# Patient Record
Sex: FEMALE | Race: WHITE | Employment: OTHER | URBAN - METROPOLITAN AREA
[De-identification: names, ages, dates, MRNs, and addresses within clinical notes are randomized per-mention and may not be internally consistent; named-entity substitution may affect disease eponyms.]

---

## 2017-01-02 ENCOUNTER — TELEPHONE (OUTPATIENT)
Dept: FAMILY MEDICINE | Facility: CLINIC | Age: 57
End: 2017-01-02

## 2017-01-02 ENCOUNTER — THERAPY VISIT (OUTPATIENT)
Dept: CHIROPRACTIC MEDICINE | Facility: CLINIC | Age: 57
End: 2017-01-02
Payer: COMMERCIAL

## 2017-01-02 DIAGNOSIS — M99.01 SEGMENTAL DYSFUNCTION OF CERVICAL REGION: Primary | ICD-10-CM

## 2017-01-02 DIAGNOSIS — M99.03 SEGMENTAL DYSFUNCTION OF LUMBAR REGION: ICD-10-CM

## 2017-01-02 DIAGNOSIS — M54.2 CERVICALGIA: ICD-10-CM

## 2017-01-02 DIAGNOSIS — M54.12 BRACHIAL NEURITIS OR RADICULITIS: ICD-10-CM

## 2017-01-02 DIAGNOSIS — M99.02 SEGMENTAL DYSFUNCTION OF THORACIC REGION: ICD-10-CM

## 2017-01-02 PROCEDURE — 98941 CHIROPRACT MANJ 3-4 REGIONS: CPT | Mod: AT | Performed by: CHIROPRACTOR

## 2017-01-02 NOTE — PROGRESS NOTES
"Visit #:  32    Subjective:  Jaki Gómez is a 55 year old female who is seen in f/u up for:        Segmental dysfunction of cervical region  Segmental dysfunction of lumbar region  Brachial neuritis or radiculitis  Cervicalgia  Segmental dysfunction of thoracic region.     Since last visit on 12/16/2016,  Jaki Gómez reports: that she is doing pretty good. Her legs and butt are achey. She rates her pain 5/10. She feels like \"one solid block.\"     ADL's : ADLs have improved with injections.     Objective:  The following was observed:    P: pain elicited on palpation, right CT junction, right lower back pain  A: static palpation demonstrates intersegmental asymmetry, as noted  R: motion palpation notes restricted motion  T: hypertonicity at:  Upper traps R>>L    Segmental spinal dysfunction/restrictions found at:  C2 RR, LRR  C5 LR, RRR  T1 RR,  LRR  T7 E, FR  L4 LR, RRR  Right SI posterior, restricted P to A        Assessment:    Diagnoses:      1. Segmental dysfunction of cervical region    2. Segmental dysfunction of lumbar region    3. Brachial neuritis or radiculitis    4. Cervicalgia    5. Segmental dysfunction of thoracic region        Patient's condition:  Patient had restrictions pre-manipulation and Patient had decreased motion prior to manipulation    Treatment effectiveness:  Post manipulation there is better intersegmental movement and Patient claims to feel looser post manipulation      Procedures:  CMT:  12376 Chiropractic manipulative treatment 3-4 regions performed   Cervical: Diversified, C2, C5, Supine   Thoracic: Diversified, T1, T7, Prone   Pelvis: Drop, Right SI, L4, Prone      Modalities:  MSTM to affected musculature, 5 min    Therapeutic procedures:  None      Prognosis: Good    Progress towards Goals: Patient has had recent exacerbation with out care. She would like to go back to once per week.    Recommendations:    Instructions:ice 20 minutes every other hour as needed and stretch as " instructed at visit    Follow-up:  Return to care in 1 week.

## 2017-01-02 NOTE — TELEPHONE ENCOUNTER
Prior auth needed for BID for Omeprazole 40mg.     Pt ins: [place] [phone]  Pt ID#: [id number]    Genesis Gómez, Pharmacy Technician  [facility]  [phone]

## 2017-01-03 DIAGNOSIS — M54.2 CERVICALGIA: Primary | ICD-10-CM

## 2017-01-03 NOTE — TELEPHONE ENCOUNTER
Tramadol   Last Written Prescription Date: 12/7/16  Last Fill Quantity: 120,  # refills: 0   Last Office Visit with [facility], [facility] or [facility] prescribing provider: 5/16/16      Yamile Arthur  [facility].  [facility]  [phone]

## 2017-01-04 RX ORDER — TRAMADOL HYDROCHLORIDE 50 MG/1
50 TABLET ORAL EVERY 6 HOURS PRN
Qty: 120 TABLET | Refills: 0 | Status: SHIPPED | OUTPATIENT
Start: 2017-01-04 | End: 2017-02-03

## 2017-01-11 NOTE — TELEPHONE ENCOUNTER
Patient will need to be seen or do over the counter.  This is not on her medication list.  Pharmacy notified.

## 2017-01-13 ENCOUNTER — THERAPY VISIT (OUTPATIENT)
Dept: CHIROPRACTIC MEDICINE | Facility: CLINIC | Age: 57
End: 2017-01-13
Payer: COMMERCIAL

## 2017-01-13 DIAGNOSIS — M99.02 SEGMENTAL DYSFUNCTION OF THORACIC REGION: ICD-10-CM

## 2017-01-13 DIAGNOSIS — M99.03 SEGMENTAL DYSFUNCTION OF LUMBAR REGION: ICD-10-CM

## 2017-01-13 DIAGNOSIS — M54.12 BRACHIAL NEURITIS OR RADICULITIS: ICD-10-CM

## 2017-01-13 DIAGNOSIS — M99.07 SEGMENTAL AND SOMATIC DYSFUNCTION OF UPPER EXTREMITY: ICD-10-CM

## 2017-01-13 DIAGNOSIS — M99.01 SEGMENTAL DYSFUNCTION OF CERVICAL REGION: Primary | ICD-10-CM

## 2017-01-13 DIAGNOSIS — M54.2 CERVICALGIA: ICD-10-CM

## 2017-01-13 PROCEDURE — 98943 CHIROPRACT MANJ XTRSPINL 1/>: CPT | Performed by: CHIROPRACTOR

## 2017-01-13 PROCEDURE — 98940 CHIROPRACT MANJ 1-2 REGIONS: CPT | Mod: AT | Performed by: CHIROPRACTOR

## 2017-01-25 ENCOUNTER — THERAPY VISIT (OUTPATIENT)
Dept: CHIROPRACTIC MEDICINE | Facility: CLINIC | Age: 57
End: 2017-01-25
Payer: COMMERCIAL

## 2017-01-25 DIAGNOSIS — M99.03 SEGMENTAL DYSFUNCTION OF LUMBAR REGION: ICD-10-CM

## 2017-01-25 DIAGNOSIS — M99.01 SEGMENTAL DYSFUNCTION OF CERVICAL REGION: Primary | ICD-10-CM

## 2017-01-25 DIAGNOSIS — M54.12 BRACHIAL NEURITIS OR RADICULITIS: ICD-10-CM

## 2017-01-25 DIAGNOSIS — M99.02 SEGMENTAL DYSFUNCTION OF THORACIC REGION: ICD-10-CM

## 2017-01-25 DIAGNOSIS — M54.2 CERVICALGIA: ICD-10-CM

## 2017-01-25 PROCEDURE — 98941 CHIROPRACT MANJ 3-4 REGIONS: CPT | Mod: AT | Performed by: CHIROPRACTOR

## 2017-01-25 NOTE — PROGRESS NOTES
Visit #:  34    Subjective:  Jaki Gómez is a 55 year old female who is seen in f/u up for:        Segmental dysfunction of cervical region  Segmental dysfunction of lumbar region  Brachial neuritis or radiculitis  Cervicalgia  Segmental dysfunction of thoracic region.     Since last visit on 1/13/2017,  Jaki Gómez reports: that her lower has been bothering her the last couple days. She is wearing a lumbar support brace today. She states the pain never leaves, but chiropractic relieves it. Her hand and neck are still bothering her.   She rates her pain 5-6/10.     ADL's : ADLs have improved with injections.     Objective:  The following was observed:    P: pain elicited on palpation, right CT junction, right forearm  A: static palpation demonstrates intersegmental asymmetry, as noted  R: motion palpation notes restricted motion  T: hypertonicity at:  Upper traps R>>L    Segmental spinal dysfunction/restrictions found at:  C2 LR, RRR  C5 RR, LRR  T1 RR, LRR  T5 E, FR  L4 RR, LRR  Right SI joint posterior          Assessment:    Diagnoses:      1. Segmental dysfunction of cervical region    2. Segmental dysfunction of lumbar region    3. Brachial neuritis or radiculitis    4. Cervicalgia    5. Segmental dysfunction of thoracic region        Patient's condition:  Patient had restrictions pre-manipulation and Patient had decreased motion prior to manipulation    Treatment effectiveness:  Post manipulation there is better intersegmental movement and Patient claims to feel looser post manipulation      Procedures:  CMT:  24156 Chiropractic manipulative treatment 1-2 regions performed   Cervical: Diversified, C2, C5, Supine   Thoracic: Diversified, T1, T5 Prone   Lumbar/Pelvis: Drop assist, L4 RR, LRR, Right SI joint, Prone      Modalities:  MSTM to affected musculature    Therapeutic procedures:  None      Prognosis: Good    Progress towards Goals: Patient has had recent exacerbation with out care. She would like  to go back to once per week.    Recommendations:    Instructions:ice 20 minutes every other hour as needed and stretch as instructed at visit    Follow-up:  Return to care as needed, recommended Friday.

## 2017-02-01 DIAGNOSIS — M54.2 CERVICALGIA: ICD-10-CM

## 2017-02-01 DIAGNOSIS — F33.0 MAJOR DEPRESSIVE DISORDER, RECURRENT EPISODE, MILD (H): Primary | ICD-10-CM

## 2017-02-01 NOTE — TELEPHONE ENCOUNTER
SERTRALINE      Last Written Prescription Date: 1/3/17  Last Fill Quantity: 30, # refills: 0  Last Office Visit with [facility] primary care provider:  5/16/16        Last PHQ-9 score on record=   PHQ-9 SCORE 1/18/2016   Total Score -   Total Score 9       Sophie Arevalo MA 2/1/2017

## 2017-02-03 RX ORDER — SERTRALINE HYDROCHLORIDE 100 MG/1
TABLET, FILM COATED ORAL
Qty: 30 TABLET | Refills: 0 | Status: SHIPPED | OUTPATIENT
Start: 2017-02-03 | End: 2017-03-01

## 2017-02-03 RX ORDER — TRAMADOL HYDROCHLORIDE 50 MG/1
50 TABLET ORAL EVERY 6 HOURS PRN
Qty: 120 TABLET | Refills: 0 | Status: SHIPPED | OUTPATIENT
Start: 2017-02-03 | End: 2017-03-01

## 2017-02-03 NOTE — TELEPHONE ENCOUNTER
Pt called & states the Pharmacy may be messing up & faxing RXs over with a name of Corey.  Pt calling to fu on Sertaline & Tramadol.  Thank you,  Antonieta Araujo  Patient Representative

## 2017-02-06 ENCOUNTER — TELEPHONE (OUTPATIENT)
Dept: FAMILY MEDICINE | Facility: CLINIC | Age: 57
End: 2017-02-06

## 2017-02-06 DIAGNOSIS — M54.2 CERVICALGIA: Primary | ICD-10-CM

## 2017-02-06 NOTE — TELEPHONE ENCOUNTER
Obtained request from [facility] for Tamadol which was dispensed at [facility] on 2/3/17 and rashad Tapia ,at [facility]//Alyson Perez/LAMONTE([facility])

## 2017-02-17 ENCOUNTER — THERAPY VISIT (OUTPATIENT)
Dept: CHIROPRACTIC MEDICINE | Facility: CLINIC | Age: 57
End: 2017-02-17
Payer: COMMERCIAL

## 2017-02-17 DIAGNOSIS — M99.01 SEGMENTAL DYSFUNCTION OF CERVICAL REGION: ICD-10-CM

## 2017-02-17 DIAGNOSIS — M99.02 SEGMENTAL DYSFUNCTION OF THORACIC REGION: ICD-10-CM

## 2017-02-17 DIAGNOSIS — M99.03 SEGMENTAL DYSFUNCTION OF LUMBAR REGION: ICD-10-CM

## 2017-02-17 DIAGNOSIS — M54.12 BRACHIAL NEURITIS OR RADICULITIS: ICD-10-CM

## 2017-02-17 DIAGNOSIS — M54.2 CERVICALGIA: ICD-10-CM

## 2017-02-17 DIAGNOSIS — M99.04 SEGMENTAL DYSFUNCTION OF SACRAL REGION: Primary | ICD-10-CM

## 2017-02-17 PROCEDURE — 98941 CHIROPRACT MANJ 3-4 REGIONS: CPT | Mod: AT | Performed by: CHIROPRACTOR

## 2017-02-17 NOTE — MR AVS SNAPSHOT
"              After Visit Summary   2/17/2017    Jaki Gómez    MRN: [id number]           Patient Information     Date Of Birth          1960        Visit Information        Provider Department      2/17/2017 2:00 PM Sharri Nicolas DC [facility]        Today's Diagnoses     Segmental dysfunction of sacral region    -  1    Segmental dysfunction of cervical region        Segmental dysfunction of lumbar region        Brachial neuritis or radiculitis        Cervicalgia        Segmental dysfunction of thoracic region           Follow-ups after your visit        Who to contact     If you have questions or need follow up information about today's clinic visit or your schedule please contact [facility] directly at [phone].  Normal or non-critical lab and imaging results will be communicated to you by [employer], letter or phone within 4 business days after the clinic has received the results. If you do not hear from us within 7 days, please contact the clinic through [employer] or phone. If you have a critical or abnormal lab result, we will notify you by phone as soon as possible.  Submit refill requests through [employer] or call your pharmacy and they will forward the refill request to us. Please allow 3 business days for your refill to be completed.          Additional Information About Your Visit        MyChart Information     [employer] lets you send messages to your doctor, view your test results, renew your prescriptions, schedule appointments and more. To sign up, go to [email or web address] . Click on \"Log in\" on the left side of the screen, which will take you to the Welcome page. Then click on \"Sign up Now\" on the right side of the page.     You will be asked to enter the access code listed below, as well as some personal information. Please follow the directions to create your username and password.     Your access code is: [email or web address]  Expires: 5/18/2017 "  2:13 PM     Your access code will  in 90 days. If you need help or a new code, please call your [facility] clinic or [phone].        Care EveryWhere ID     This is your Care EveryWhere ID. This could be used by other organizations to access your [facility] medical records  [id number]         Blood Pressure from Last 3 Encounters:   16 133/83   16 110/70   02/15/16 134/84    Weight from Last 3 Encounters:   16 59 kg (130 lb)   16 61.7 kg (136 lb)   16 61.2 kg (135 lb)              We Performed the Following     CHIROPRAC MANIP,SPINAL,3-4 REGIONS        Primary Care Provider Office Phone # Fax #    Narendra Peters [phone]       [facility] [address] [place] [address]        Thank you!     Thank you for choosing [facility]  for your care. Our goal is always to provide you with excellent care. Hearing back from our patients is one way we can continue to improve our services. Please take a few minutes to complete the written survey that you may receive in the mail after your visit with us. Thank you!             Your Updated Medication List - Protect others around you: Learn how to safely use, store and throw away your medicines at www.disposemymeds.org.          This list is accurate as of: 17  2:13 PM.  Always use your most recent med list.                   Brand Name Dispense Instructions for use    cyclobenzaprine 10 MG tablet    FLEXERIL    90 tablet    TAKE ONE TABLET BY MOUTH THREE TIMES A DAY AS NEEDED FOR MUSCLE SPASMS       gabapentin 300 MG capsule    NEURONTIN    90 capsule    TAKE ONE CAPSULE BY MOUTH THREE TIMES A DAY       hydrOXYzine 50 MG capsule    VISTARIL    30 capsule    TAKE 1 CAPSULE (50 MG) BYMOUTH AT BEDTIME       levothyroxine 125 MCG tablet    SYNTHROID/LEVOTHROID    30 tablet    TAKE ONE TABLET BY MOUTH EVERY DAY       MULTIVITAMIN ADULT PO      Take 1 tablet by mouth daily        sertraline 100 MG tablet    ZOLOFT    30 tablet    TAKE ONE TABLET BY MOUTH EVERY DAY (FOLLOW-UP APPOINTMENT NEEDED FOR FURTHER REFILLS)       traMADol 50 MG tablet    ULTRAM    120 tablet    Take 1 tablet (50 mg) by mouth every 6 hours as needed       TYLENOL 325 MG tablet   Generic drug:  acetaminophen     100 tablet    Take 2 tablets (650 mg) by mouth every 4 hours as needed

## 2017-02-17 NOTE — PROGRESS NOTES
Visit #:  35    Subjective:  Jaki Gómez is a 55 year old female who is seen in f/u up for:        Segmental dysfunction of cervical region  Segmental dysfunction of lumbar region  Brachial neuritis or radiculitis  Cervicalgia  Segmental dysfunction of thoracic region.     Since last visit on 1/25/2017,  Jaki Gómez reports: that she has been sick for 2 weeks. She is starting to feel better, but has been really sick. Her lower back feels like it is out. She rates her pain 4/10 in her upper and lower back. She notes that she is not having the arm symptoms into her R UE anymore.      ADL's : ADLs have improved with injections.     Objective:  The following was observed:    P: pain elicited on palpation, right lower back  A: static palpation demonstrates intersegmental asymmetry, as noted  R: motion palpation notes restricted motion  T: hypertonicity at:  Paraspinals R>>L    Segmental spinal dysfunction/restrictions found at:  C2 LR, RRR  C5 RR, LRR  T1 RR, LRR  T3 LR, RRR  T7 E, FR  L4 LR, RRR  Right SI joint posterior          Assessment:    Diagnoses:      1. Segmental dysfunction of cervical region    2. Segmental dysfunction of lumbar region    3. Brachial neuritis or radiculitis    4. Cervicalgia    5. Segmental dysfunction of thoracic region        Patient's condition:  Patient had restrictions pre-manipulation and Patient had decreased motion prior to manipulation    Treatment effectiveness:  Post manipulation there is better intersegmental movement and Patient claims to feel looser post manipulation      Procedures:  CMT:  84797 Chiropractic manipulative treatment 1-2 regions performed   Cervical: Diversified, C2, C5, Supine   Thoracic: Diversified, T1, T3, T7 Prone   Lumbar/Pelvis: Drop assist, L4, Right SI joint, Prone      Modalities:  MSTM to affected musculature    Therapeutic procedures:  None      Prognosis: Good    Progress towards Goals: Patient had been doing really well until she had  URI.    Recommendations:    Instructions:ice 20 minutes every other hour as needed and stretch as instructed at visit    Follow-up:  Return to care as needed.

## 2017-03-01 DIAGNOSIS — M54.2 CERVICALGIA: ICD-10-CM

## 2017-03-01 NOTE — TELEPHONE ENCOUNTER
Tramadol 50mg      Last Written Prescription Date: 02/03/2017  Last Fill Quantity: 120,  # refills: 0   Last Office Visit with [facility], [facility] or [facility] prescribing provider: 05/16/2016                                         Next 5 appointments (look out 90 days)     Mar 08, 2017  1:20 PM CST   Office Visit with Narendra Peters MD   [facility]    [address] [place] [id number] [phone]                  Irina Harding CPhT  [facility]  [phone]

## 2017-03-02 RX ORDER — TRAMADOL HYDROCHLORIDE 50 MG/1
50 TABLET ORAL EVERY 6 HOURS PRN
Qty: 120 TABLET | Refills: 0 | Status: SHIPPED | OUTPATIENT
Start: 2017-03-05 | End: 2017-03-31

## 2017-03-08 ENCOUNTER — HOSPITAL ENCOUNTER (OUTPATIENT)
Dept: MAMMOGRAPHY | Facility: CLINIC | Age: 57
Discharge: HOME | End: 2017-03-08
Attending: FAMILY MEDICINE | Admitting: FAMILY MEDICINE
Payer: COMMERCIAL

## 2017-03-08 ENCOUNTER — OFFICE VISIT (OUTPATIENT)
Dept: FAMILY MEDICINE | Facility: CLINIC | Age: 57
End: 2017-03-08
Payer: COMMERCIAL

## 2017-03-08 VITALS
SYSTOLIC BLOOD PRESSURE: 130 MMHG | BODY MASS INDEX: 26.62 KG/M2 | HEIGHT: 61 IN | HEART RATE: 100 BPM | OXYGEN SATURATION: 100 % | RESPIRATION RATE: 24 BRPM | DIASTOLIC BLOOD PRESSURE: 80 MMHG | WEIGHT: 141 LBS | TEMPERATURE: 97.2 F

## 2017-03-08 DIAGNOSIS — Z12.11 ENCOUNTER FOR SCREENING COLONOSCOPY: ICD-10-CM

## 2017-03-08 DIAGNOSIS — Z12.31 VISIT FOR SCREENING MAMMOGRAM: ICD-10-CM

## 2017-03-08 DIAGNOSIS — M54.5 BILATERAL LOW BACK PAIN, UNSPECIFIED CHRONICITY, WITH SCIATICA PRESENCE UNSPECIFIED: Primary | ICD-10-CM

## 2017-03-08 DIAGNOSIS — M54.2 CERVICAL PAIN: ICD-10-CM

## 2017-03-08 DIAGNOSIS — F33.0 MAJOR DEPRESSIVE DISORDER, RECURRENT EPISODE, MILD (H): ICD-10-CM

## 2017-03-08 PROCEDURE — 99214 OFFICE O/P EST MOD 30 MIN: CPT | Performed by: FAMILY MEDICINE

## 2017-03-08 PROCEDURE — G0202 SCR MAMMO BI INCL CAD: HCPCS

## 2017-03-08 ASSESSMENT — ANXIETY QUESTIONNAIRES
IF YOU CHECKED OFF ANY PROBLEMS ON THIS QUESTIONNAIRE, HOW DIFFICULT HAVE THESE PROBLEMS MADE IT FOR YOU TO DO YOUR WORK, TAKE CARE OF THINGS AT HOME, OR GET ALONG WITH OTHER PEOPLE: SOMEWHAT DIFFICULT
5. BEING SO RESTLESS THAT IT IS HARD TO SIT STILL: SEVERAL DAYS
GAD7 TOTAL SCORE: 5
3. WORRYING TOO MUCH ABOUT DIFFERENT THINGS: NOT AT ALL
2. NOT BEING ABLE TO STOP OR CONTROL WORRYING: NOT AT ALL
7. FEELING AFRAID AS IF SOMETHING AWFUL MIGHT HAPPEN: NOT AT ALL
1. FEELING NERVOUS, ANXIOUS, OR ON EDGE: NOT AT ALL
6. BECOMING EASILY ANNOYED OR IRRITABLE: MORE THAN HALF THE DAYS

## 2017-03-08 ASSESSMENT — PATIENT HEALTH QUESTIONNAIRE - PHQ9: 5. POOR APPETITE OR OVEREATING: MORE THAN HALF THE DAYS

## 2017-03-08 NOTE — PROGRESS NOTES
SUBJECTIVE:                                                    Jaki Gómez is a 56 year old female who presents to clinic today for the following health issues:      Medication Followup of all current medications    Taking Medication as prescribed: yes    Side Effects:  None    Medication Helping Symptoms:  yes           Problem list and histories reviewed & adjusted, as indicated.  Additional history: as documented        Reviewed and updated as needed this visit by clinical staff  Tobacco  Allergies  Meds       Reviewed and updated as needed this visit by Provider        SUBJECTIVE:  Jaki  is a 56 year old female who presents for:  Follow-up of her cervical radiculopathy and lumbar back pain. She has been taking tramadol. She has been going to chiropractor about once a week. So well. She went to [facility] and they did multiple injection which did help. But she would like another opinion now that they are not with us anymore. Apparently there was talk of possibility of surgical intervention. She feels some things got to be done and she is not progressing. She states she has weakness in her hand at times on the right and tingling. Another issue is her depression. She is currently taking Zoloft she wants to continue on but she thinks a lot of her depression is related to her chronic neck and back symptoms.    Past Medical History   Diagnosis Date     Lumbago      Multiple sclerosis (H)      Unspecified hypothyroidism      Hypothyroidism     Past Surgical History   Procedure Laterality Date     Hc closed tx tibial shaft fx w/o manipulation  2003     Closed reduction and long leg cast application of left tib-fib fx.     Hc treatment missed  surg, 1st trimester       Suction dilatation and curettage     Hc inj epidural lumbar/sacral w/wo contrast  2006     Steroid injection L4-5     Esophagoscopy, gastroscopy, duodenoscopy (egd), combined N/A 2016     Procedure: COMBINED  "ESOPHAGOSCOPY, GASTROSCOPY, DUODENOSCOPY (EGD);  Surgeon: Walter Rodriguez MD;  Location:  GI     Esophagoscopy, gastroscopy, duodenoscopy (egd), combined N/A 2/15/2016     Procedure: COMBINED ESOPHAGOSCOPY, GASTROSCOPY, DUODENOSCOPY (EGD);  Surgeon: Narendra Mendoza MD;  Location:  GI     Social History   Substance Use Topics     Smoking status: Current Every Day Smoker     Packs/day: 0.50     Types: Cigarettes     Smokeless tobacco: Never Used     Alcohol use No     Current Outpatient Prescriptions   Medication Sig Dispense Refill     sertraline (ZOLOFT) 100 MG tablet TAKE ONE TABLET BY MOUTH EVERY DAY (FOLLOW-UP APPOINTMENT NEEDED FOR FURTHER REFILLS) 30 tablet 0     traMADol (ULTRAM) 50 MG tablet Take 1 tablet (50 mg) by mouth every 6 hours as needed 120 tablet 0     levothyroxine (SYNTHROID/LEVOTHROID) 125 MCG tablet TAKE ONE TABLET BY MOUTH EVERY DAY 30 tablet 6     cyclobenzaprine (FLEXERIL) 10 MG tablet TAKE ONE TABLET BY MOUTH THREE TIMES A DAY AS NEEDED FOR MUSCLE SPASMS 90 tablet 3     gabapentin (NEURONTIN) 300 MG capsule TAKE ONE CAPSULE BY MOUTH THREE TIMES A DAY 90 capsule 11     acetaminophen (TYLENOL) 325 MG tablet Take 2 tablets (650 mg) by mouth every 4 hours as needed 100 tablet      Multiple Vitamins-Minerals (MULTIVITAMIN ADULT PO) Take 1 tablet by mouth daily         REVIEW OF SYSTEMS:   5 point ROS negative except as noted above in HPI, including Gen., Resp, CV, GI &  system review.     OBJECTIVE:  Vitals: /80 (Cuff Size: Adult Regular)  Pulse 100  Temp 97.2  F (36.2  C) (Temporal)  Resp 24  Ht 5' 1\" (1.549 m)  Wt 141 lb (64 kg)  SpO2 100%  BMI 26.64 kg/m2  BMI= Body mass index is 26.64 kg/(m^2).  She appears in mild discomfort. Affect is somewhat flat. PH Q9 score is 12. RACQUEL score is 6. Head is normocephalic. Eyes PERRLA. Neck she is tender at the base of the paraspinous muscles. Fairly good range of motion. She may have a little bit of decreased  strength on the right. Back " with some tenderness in the paraspinous muscles. Straight leg raising is negative. Gait is regular.    ASSESSMENT:  #1 low back pain #2 cervical pain with radiculopathy #3 major depressive disorder    PLAN:  Coumadin continue on chiropractic. He is going to make an arrangement for her to see neurosurgery in consult regarding surgical solution possibilities. She does have some tramadol we can renew this when the time comes. He also takes Flexeril and gabapentin. We'll renew her Zoloft 50 mg a day for an extended period of time when the next renewal comes up.      Narendra Peters MD  [facility]

## 2017-03-08 NOTE — NURSING NOTE
"Chief Complaint   Patient presents with     Recheck Medication       Initial /80 (Cuff Size: Adult Regular)  Pulse 100  Temp 97.2  F (36.2  C) (Temporal)  Resp 24  Ht 5' 1\" (1.549 m)  Wt 141 lb (64 kg)  SpO2 100%  BMI 26.64 kg/m2 Estimated body mass index is 26.64 kg/(m^2) as calculated from the following:    Height as of this encounter: 5' 1\" (1.549 m).    Weight as of this encounter: 141 lb (64 kg).  Medication Reconciliation: complete    "

## 2017-03-08 NOTE — MR AVS SNAPSHOT
After Visit Summary   3/8/2017    Jaki Gómez    MRN: [id number]           Patient Information     Date Of Birth          1960        Visit Information        Provider Department      3/8/2017 1:20 PM Narendra Peters MD [facility]        Today's Diagnoses     Visit for screening mammogram    -  1    Encounter for screening colonoscopy        Bilateral low back pain, unspecified chronicity, with sciatica presence unspecified        Cervical pain           Follow-ups after your visit        Additional Services     GASTROENTEROLOGY ADULT REF PROCEDURE ONLY       Last Lab Result: Creatinine (mg/dL)       Date                     Value                 02/15/2016               0.57             ----------  Body mass index is 26.64 kg/(m^2).     Needed:  No  Language:  English    Patient will be contacted to schedule procedure.     Please be aware that coverage of these services is subject to the terms and limitations of your health insurance plan.  Call member services at your health plan with any benefit or coverage questions.  Any procedures must be performed at a [facility] facility OR coordinated by your clinic's referral office.    Please bring the following with you to your appointment:    (1) Any X-Rays, CTs or MRIs which have been performed.  Contact the facility where they were done to arrange for  prior to your scheduled appointment.    (2) List of current medications   (3) This referral request   (4) Any documents/labs given to you for this referral            NEUROSURGERY REFERRAL       Your provider has referred you to: [facility]: [facility] [phone]   [email or web address]    Please be aware that coverage of these services is subject to the terms and limitations of your health insurance plan.  Call member services at your health plan with any benefit or coverage questions.       Please bring the following with you to your appointment:    (1) Any X-Rays, CTs or MRIs which have been performed.  Contact the facility where they were done to arrange for  prior to your scheduled appointment.   (2) List of current medications  (3) This referral request   (4) Any documents/labs given to you for this referral                  Your next 10 appointments already scheduled     Mar 08, 2017  2:00 PM CST   MA SCREENING DIGITAL BILATERAL with PHMA1   [facility] ([facility])    [address] [place] [address] [id number] [phone]           Do not use any powder, lotion or deodorant under your arms or on your breast. If you do, we will ask you to remove it before your exam.  Wear comfortable, two-piece clothing.  If you have any allergies, tell your care team.  Bring any previous mammograms from other facilities or have them mailed to the breast center.            Mar 10, 2017 12:00 PM CST   [facility] Chiropractor with Sharri Nicolas DC   [facility] ([facility])    [address] [place] [id number] [phone]            Mar 30, 2017  2:40 PM CDT   [facility] Visit with [facility]olinar [facility] MD   [facility]    [address] [place] [id number] [phone]              Future tests that were ordered for you today     Open Future Orders        Priority Expected Expires Ordered    *MA Screening Digital Bilateral Routine  3/8/2018 3/8/2017            Who to contact     If you have questions or need follow up information about today's clinic visit or your schedule please contact [facility] directly at [phone].  Normal or non-critical lab and imaging results will be communicated to you by MyChart, letter or phone within 4 business days after the clinic has received the results. If you do not hear from us within 7 days, please contact the clinic through  "[employer] or phone. If you have a critical or abnormal lab result, we will notify you by phone as soon as possible.  Submit refill requests through [employer] or call your pharmacy and they will forward the refill request to us. Please allow 3 business days for your refill to be completed.          Additional Information About Your Visit        [employer] Information     [employer] lets you send messages to your doctor, view your test results, renew your prescriptions, schedule appointments and more. To sign up, go to [email or web address] . Click on \"Log in\" on the left side of the screen, which will take you to the Welcome page. Then click on \"Sign up Now\" on the right side of the page.     You will be asked to enter the access code listed below, as well as some personal information. Please follow the directions to create your username and password.     Your access code is: [email or web address]  Expires: 2017  2:13 PM     Your access code will  in 90 days. If you need help or a new code, please call your [facility] clinic or [phone].        Care EveryWhere ID     This is your Care EveryWhere ID. This could be used by other organizations to access your [facility] medical records  [id number]        Your Vitals Were     Pulse Temperature Respirations Height Pulse Oximetry BMI (Body Mass Index)    100 97.2  F (36.2  C) (Temporal) 24 5' 1\" (1.549 m) 100% 26.64 kg/m2       Blood Pressure from Last 3 Encounters:   17 130/80   16 133/83   16 110/70    Weight from Last 3 Encounters:   17 141 lb (64 kg)   16 130 lb (59 kg)   16 136 lb (61.7 kg)              We Performed the Following     GASTROENTEROLOGY ADULT REF PROCEDURE ONLY     NEUROSURGERY REFERRAL        Primary Care Provider Office Phone # Fax #    Narendra Peters [phone]       [facility] [address] [place] [address]        Thank you!     Thank you for choosing [facility]  for your care. Our goal is always to provide you with excellent care. Hearing back from our patients is one way we can continue to improve our services. Please take a few minutes to complete the written survey that you may receive in the mail after your visit with us. Thank you!             Your Updated Medication List - Protect others around you: Learn how to safely use, store and throw away your medicines at www.disposemymeds.org.          This list is accurate as of: 3/8/17  1:50 PM.  Always use your most recent med list.                   Brand Name Dispense Instructions for use    cyclobenzaprine 10 MG tablet    FLEXERIL    90 tablet    TAKE ONE TABLET BY MOUTH THREE TIMES A DAY AS NEEDED FOR MUSCLE SPASMS       gabapentin 300 MG capsule    NEURONTIN    90 capsule    TAKE ONE CAPSULE BY MOUTH THREE TIMES A DAY       levothyroxine 125 MCG tablet    SYNTHROID/LEVOTHROID    30 tablet    TAKE ONE TABLET BY MOUTH EVERY DAY       MULTIVITAMIN ADULT PO      Take 1 tablet by mouth daily       sertraline 100 MG tablet    ZOLOFT    30 tablet    TAKE ONE TABLET BY MOUTH EVERY DAY (FOLLOW-UP APPOINTMENT NEEDED FOR FURTHER REFILLS)       traMADol 50 MG tablet    ULTRAM    120 tablet    Take 1 tablet (50 mg) by mouth every 6 hours as needed       TYLENOL 325 MG tablet   Generic drug:  acetaminophen     100 tablet    Take 2 tablets (650 mg) by mouth every 4 hours as needed

## 2017-03-09 ASSESSMENT — PATIENT HEALTH QUESTIONNAIRE - PHQ9: SUM OF ALL RESPONSES TO PHQ QUESTIONS 1-9: 12

## 2017-03-09 ASSESSMENT — ANXIETY QUESTIONNAIRES: GAD7 TOTAL SCORE: 5

## 2017-03-10 ENCOUNTER — TELEPHONE (OUTPATIENT)
Dept: FAMILY MEDICINE | Facility: CLINIC | Age: 57
End: 2017-03-10

## 2017-03-10 ENCOUNTER — THERAPY VISIT (OUTPATIENT)
Dept: CHIROPRACTIC MEDICINE | Facility: CLINIC | Age: 57
End: 2017-03-10
Payer: COMMERCIAL

## 2017-03-10 DIAGNOSIS — M54.2 CERVICALGIA: ICD-10-CM

## 2017-03-10 DIAGNOSIS — M99.02 SEGMENTAL DYSFUNCTION OF THORACIC REGION: ICD-10-CM

## 2017-03-10 DIAGNOSIS — M99.01 SEGMENTAL DYSFUNCTION OF CERVICAL REGION: ICD-10-CM

## 2017-03-10 DIAGNOSIS — M54.12 BRACHIAL NEURITIS OR RADICULITIS: ICD-10-CM

## 2017-03-10 DIAGNOSIS — M99.04 SEGMENTAL DYSFUNCTION OF SACRAL REGION: Primary | ICD-10-CM

## 2017-03-10 DIAGNOSIS — M99.03 SEGMENTAL DYSFUNCTION OF LUMBAR REGION: ICD-10-CM

## 2017-03-10 PROCEDURE — 98941 CHIROPRACT MANJ 3-4 REGIONS: CPT | Mod: AT | Performed by: CHIROPRACTOR

## 2017-03-10 NOTE — TELEPHONE ENCOUNTER
Left message for patient to return call to schedule EGD/colonoscopy. If Angeli or Jaki are not available, please transfer to same day surgery        Schedule with FV

## 2017-03-10 NOTE — MR AVS SNAPSHOT
"              After Visit Summary   3/10/2017    Jaki Gómez    MRN: [id number]           Patient Information     Date Of Birth          1960        Visit Information        Provider Department      3/10/2017 12:00 PM Sharri Nicolas DC [facility]        Today's Diagnoses     Segmental dysfunction of sacral region    -  1    Segmental dysfunction of cervical region        Segmental dysfunction of lumbar region        Brachial neuritis or radiculitis        Cervicalgia        Segmental dysfunction of thoracic region           Follow-ups after your visit        Your next 10 appointments already scheduled     Mar 30, 2017  2:40 PM CDT   New Visit with Apolinar Marie MD   [facility]    [address] [place] [phone]              Who to contact     If you have questions or need follow up information about today's clinic visit or your schedule please contact [facility] directly at [phone].  Normal or non-critical lab and imaging results will be communicated to you by MyChart, letter or phone within 4 business days after the clinic has received the results. If you do not hear from us within 7 days, please contact the clinic through [employer] or phone. If you have a critical or abnormal lab result, we will notify you by phone as soon as possible.  Submit refill requests through [employer] or call your pharmacy and they will forward the refill request to us. Please allow 3 business days for your refill to be completed.          Additional Information About Your Visit        [employer]cierge[employer]umen[employer] Information     [employer] lets you send messages to your doctor, view your test results, renew your prescriptions, schedule appointments and more. To sign up, go to [email or web address] . Click on \"Log in\" on the left side of the screen, which will take you to the Welcome page. Then click on \"Sign up Now\" on the " right side of the page.     You will be asked to enter the access code listed below, as well as some personal information. Please follow the directions to create your username and password.     Your access code is: [email or web address]  Expires: 2017  2:13 PM     Your access code will  in 90 days. If you need help or a new code, please call your [facility] or [phone].        Care EveryWhere ID     This is your Care EveryWhere ID. This could be used by other organizations to access your [place] medical records  [id number]         Blood Pressure from Last 3 Encounters:   17 130/80   16 133/83   16 110/70    Weight from Last 3 Encounters:   17 64 kg (141 lb)   16 59 kg (130 lb)   16 61.7 kg (136 lb)              We Performed the Following     CHIROPRAC MANIP,SPINAL,3-4 REGIONS        Primary Care Provider Office Phone # Fax #    Narendra Peters [phone]       [facility] [address] [place] [phone]        Thank you!     Thank you for choosing [facility]  for your care. Our goal is always to provide you with excellent care. Hearing back from our patients is one way we can continue to improve our services. Please take a few minutes to complete the written survey that you may receive in the mail after your visit with us. Thank you!             Your Updated Medication List - Protect others around you: Learn how to safely use, store and throw away your medicines at www.disposemymeds.org.          This list is accurate as of: 3/10/17 12:13 PM.  Always use your most recent med list.                   Brand Name Dispense Instructions for use    cyclobenzaprine 10 MG tablet    FLEXERIL    90 tablet    TAKE ONE TABLET BY MOUTH THREE TIMES A DAY AS NEEDED FOR MUSCLE SPASMS       gabapentin 300 MG capsule    NEURONTIN    90 capsule    TAKE ONE CAPSULE BY MOUTH THREE TIMES A DAY       levothyroxine 125 MCG  tablet    SYNTHROID/LEVOTHROID    30 tablet    TAKE ONE TABLET BY MOUTH EVERY DAY       MULTIVITAMIN ADULT PO      Take 1 tablet by mouth daily       sertraline 100 MG tablet    ZOLOFT    30 tablet    TAKE ONE TABLET BY MOUTH EVERY DAY (FOLLOW-UP APPOINTMENT NEEDED FOR FURTHER REFILLS)       traMADol 50 MG tablet    ULTRAM    120 tablet    Take 1 tablet (50 mg) by mouth every 6 hours as needed       TYLENOL 325 MG tablet   Generic drug:  acetaminophen     100 tablet    Take 2 tablets (650 mg) by mouth every 4 hours as needed

## 2017-03-10 NOTE — PROGRESS NOTES
"Visit #:  36    Subjective:  Jaki Gómez is a 55 year old female who is seen in f/u up for:        Segmental dysfunction of cervical region  Segmental dysfunction of lumbar region  Brachial neuritis or radiculitis  Cervicalgia  Segmental dysfunction of thoracic region.     Since last visit on 2/17/2017,  Jaki Gómez reports: that she has been really bad the last couple of days. Her lower back is rated \"20/10\" and her neck is rated 4/10. She saw Dr. Peters and he sent her to Dr. Marie for a \"2nd opinion.\" He is concerned about her neck and lower back and the jolts of pain that she gets consistently. She states that she has worsened without consistent care, due to scheduling.       ADL's : ADLs have improved with injections.     Objective:  The following was observed:    P: pain elicited on palpation, right lower back  A: static palpation demonstrates intersegmental asymmetry, as noted  R: motion palpation notes restricted motion  T: hypertonicity at:  Paraspinals R>>L    Segmental spinal dysfunction/restrictions found at:  C2 LR, RRR  C5 RR, LRR  T1 RR, LRR  T4 E, FR  T9 E, FR  L4 LR, RRR  Right SI joint posterior          Assessment:    Diagnoses:      1. Segmental dysfunction of cervical region    2. Segmental dysfunction of lumbar region    3. Brachial neuritis or radiculitis    4. Cervicalgia    5. Segmental dysfunction of thoracic region        Patient's condition:  Patient had restrictions pre-manipulation and Patient had decreased motion prior to manipulation    Treatment effectiveness:  Post manipulation there is better intersegmental movement and Patient claims to feel looser post manipulation      Procedures:  CMT:  98214 Chiropractic manipulative treatment 1-2 regions performed   Cervical: Diversified, C2, C5, Supine   Thoracic: Diversified, T1, T5, T9 Prone   Lumbar/Pelvis: Drop assist, L4, Right SI joint, Prone      Modalities:  MSTM to affected musculature    Therapeutic " procedures:  None      Prognosis: Good    Progress towards Goals: Patient had recent flare up of symptoms.    Recommendations:    Instructions:ice 20 minutes every other hour as needed and stretch as instructed at visit    Follow-up:  Return to care as needed.

## 2017-03-13 NOTE — TELEPHONE ENCOUNTER
Left message for patient to return call to schedule colonoscopy or EGD. If Jaki or Angeli are unavailable, please transfer to the surgery center.

## 2017-03-14 NOTE — TELEPHONE ENCOUNTER
Left message for patient to return call to schedule colonoscopy or EGD. If Jaki or Angeli are unavailable, please transfer to the surgery center.   Letter sent

## 2017-03-20 ENCOUNTER — THERAPY VISIT (OUTPATIENT)
Dept: CHIROPRACTIC MEDICINE | Facility: CLINIC | Age: 57
End: 2017-03-20
Payer: COMMERCIAL

## 2017-03-20 DIAGNOSIS — M99.01 SEGMENTAL DYSFUNCTION OF CERVICAL REGION: ICD-10-CM

## 2017-03-20 DIAGNOSIS — M54.2 CERVICALGIA: ICD-10-CM

## 2017-03-20 DIAGNOSIS — M48.00 CENTRAL SPINAL STENOSIS: ICD-10-CM

## 2017-03-20 DIAGNOSIS — M99.02 SEGMENTAL DYSFUNCTION OF THORACIC REGION: ICD-10-CM

## 2017-03-20 DIAGNOSIS — M99.03 SEGMENTAL DYSFUNCTION OF LUMBAR REGION: ICD-10-CM

## 2017-03-20 DIAGNOSIS — M99.04 SEGMENTAL DYSFUNCTION OF SACRAL REGION: Primary | ICD-10-CM

## 2017-03-20 DIAGNOSIS — M54.12 BRACHIAL NEURITIS OR RADICULITIS: ICD-10-CM

## 2017-03-20 PROCEDURE — 98941 CHIROPRACT MANJ 3-4 REGIONS: CPT | Mod: AT | Performed by: CHIROPRACTOR

## 2017-03-20 NOTE — MR AVS SNAPSHOT
"              After Visit Summary   3/20/2017    Jaki Gómez    MRN: [id number]           Patient Information     Date Of Birth          1960        Visit Information        Provider Department      3/20/2017 1:15 PM Sharri Nicolas DC [facility]        Today's Diagnoses     Segmental dysfunction of sacral region    -  1    Segmental dysfunction of cervical region        Segmental dysfunction of lumbar region        Brachial neuritis or radiculitis        Cervicalgia        Segmental dysfunction of thoracic region        Central spinal stenosis           Follow-ups after your visit        Your next 10 appointments already scheduled     Mar 30, 2017  2:40 PM CDT   [facility]    [address] [address] [phone]              Who to contact     If you have questions or need follow up information about today's clinic visit or your schedule please contact [facility] directly at [phone].  Normal or non-critical lab and imaging results will be communicated to you by MyChart, letter or phone within 4 business days after the clinic has received the results. If you do not hear from us within 7 days, please contact the clinic through [employer] or phone. If you have a critical or abnormal lab result, we will notify you by phone as soon as possible.  Submit refill requests through [employer] or call your pharmacy and they will forward the refill request to us. Please allow 3 business days for your refill to be completed.          Additional Information About Your Visit        [employer] The[employer]Boomerang Information     [employer] lets you send messages to your doctor, view your test results, renew your prescriptions, schedule appointments and more. To sign up, go to [email or web address] . Click on \"Log in\" on the left side of the screen, which will take you to the Welcome page. Then " "click on \"Sign up Now\" on the right side of the page.     You will be asked to enter the access code listed below, as well as some personal information. Please follow the directions to create your username and password.     Your access code is: [email or web address]  Expires: 2017  3:13 PM     Your access code will  in 90 days. If you need help or a new code, please call your [place] clinic or [phone].        Care EveryWhere ID     This is your Care EveryWhere ID. This could be used by other organizations to access your [place] medical records  [id number]         Blood Pressure from Last 3 Encounters:   17 130/80   16 133/83   16 110/70    Weight from Last 3 Encounters:   17 64 kg (141 lb)   16 59 kg (130 lb)   16 61.7 kg (136 lb)              We Performed the Following     CHIROPRAC MANIP,SPINAL,3-4 REGIONS        Primary Care Provider Office Phone # Fax #    Narendra Peters [phone]       [facility] [address] [place] [address]        Thank you!     Thank you for choosing [place] [facility]  for your care. Our goal is always to provide you with excellent care. Hearing back from our patients is one way we can continue to improve our services. Please take a few minutes to complete the written survey that you may receive in the mail after your visit with us. Thank you!             Your Updated Medication List - Protect others around you: Learn how to safely use, store and throw away your medicines at www.disposemymeds.org.          This list is accurate as of: 3/20/17  1:33 PM.  Always use your most recent med list.                   Brand Name Dispense Instructions for use    cyclobenzaprine 10 MG tablet    FLEXERIL    90 tablet    TAKE ONE TABLET BY MOUTH THREE TIMES A DAY AS NEEDED FOR MUSCLE SPASMS       gabapentin 300 MG capsule    NEURONTIN    90 capsule    TAKE ONE CAPSULE BY MOUTH THREE TIMES A DAY "       levothyroxine 125 MCG tablet    SYNTHROID/LEVOTHROID    30 tablet    TAKE ONE TABLET BY MOUTH EVERY DAY       MULTIVITAMIN ADULT PO      Take 1 tablet by mouth daily       sertraline 100 MG tablet    ZOLOFT    30 tablet    TAKE ONE TABLET BY MOUTH EVERY DAY (FOLLOW-UP APPOINTMENT NEEDED FOR FURTHER REFILLS)       traMADol 50 MG tablet    ULTRAM    120 tablet    Take 1 tablet (50 mg) by mouth every 6 hours as needed       TYLENOL 325 MG tablet   Generic drug:  acetaminophen     100 tablet    Take 2 tablets (650 mg) by mouth every 4 hours as needed

## 2017-03-20 NOTE — PROGRESS NOTES
"Visit #:  37    Subjective:  Jaki Gómez is a 55 year old female who is seen in f/u up for:        Segmental dysfunction of cervical region  Segmental dysfunction of lumbar region  Brachial neuritis or radiculitis  Cervicalgia  Segmental dysfunction of thoracic region.     Since last visit on 3/10/2017,  Jaki Gómez reports: that she is \"horrible.\" She states that her lower back has been bothering her since she left here. She has an appt with Dr. Marie on March 30. Her lower back is really bothering her, feels tight. Standing in line really hurts her back, she has to take the pressure off of it. The pain is rated 8/10. The pain is bilateral in the lower lumbar spine.     Review of Lumbar MRI 7/26/2016:  IMPRESSION:   1. Broad-based disc protrusion at L4-L5 extending to both sides of the  midline which results in mild central stenosis.  2. Mild facet joint disease L4-L5 in the lumbosacral levels.  3. The degenerative findings are new since the CT of 3/16/2005.    With central stenosis at L4-5, we will add Flexion/Distraction today.       ADL's : ADLs have improved with injections.     Objective:  The following was observed:    P: pain elicited on palpation, bilateral lower llumbars  A: static palpation demonstrates intersegmental asymmetry, as noted  R: motion palpation notes restricted motion  T: hypertonicity at:  Paraspinals R>>L    Segmental spinal dysfunction/restrictions found at:  C2 RR, LRR  C5 LR, RRR  T1 RR, LRR  T4 E, FR  T10 E, FR  L4 RR, LRR  Left SI joint posterior          Assessment:    Diagnoses:      1. Segmental dysfunction of cervical region    2. Segmental dysfunction of lumbar region    3. Brachial neuritis or radiculitis    4. Cervicalgia    5. Segmental dysfunction of thoracic region        Patient's condition:  Patient had restrictions pre-manipulation and Patient had decreased motion prior to manipulation    Treatment effectiveness:  Post manipulation there is better intersegmental " movement and Patient claims to feel looser post manipulation      Procedures:  CMT:  58561 Chiropractic manipulative treatment 3-4 regions performed   Cervical: Diversified, C2, C5, Supine  Thoracic: Diversified, T1, T4, T10 Prone   Lumbar/Pelvis: Drop assist, L4, Left SI joint, Prone  Flexion Distraction of lumbar spine added today.       Modalities:  MSTM to affected musculature    Therapeutic procedures:  None      Prognosis: Good    Progress towards Goals: Patient had recent flare up of symptoms.    Recommendations:    Instructions:ice 20 minutes every other hour as needed and stretch as instructed at visit    Follow-up:  Return to care as needed. Patient has appt with Dr. Marie on March 30.

## 2017-03-24 DIAGNOSIS — G35 MULTIPLE SCLEROSIS (H): ICD-10-CM

## 2017-03-24 RX ORDER — GABAPENTIN 300 MG/1
CAPSULE ORAL
Qty: 90 CAPSULE | Refills: 11 | Status: SHIPPED | OUTPATIENT
Start: 2017-03-24 | End: 2018-04-18

## 2017-03-30 ENCOUNTER — OFFICE VISIT (OUTPATIENT)
Dept: NEUROSURGERY | Facility: CLINIC | Age: 57
End: 2017-03-30
Payer: COMMERCIAL

## 2017-03-30 VITALS — TEMPERATURE: 97.3 F | BODY MASS INDEX: 27.19 KG/M2 | WEIGHT: 144 LBS | HEIGHT: 61 IN

## 2017-03-30 DIAGNOSIS — M48.02 SPINAL STENOSIS IN CERVICAL REGION: Primary | ICD-10-CM

## 2017-03-30 PROCEDURE — 99204 OFFICE O/P NEW MOD 45 MIN: CPT | Performed by: NEUROLOGICAL SURGERY

## 2017-03-30 NOTE — NURSING NOTE
"Jaki Gómez is a 56 year old female who presents for:  Chief Complaint   Patient presents with     Consult     Neurologic Problem     neck pain into bilateral arms and low back pain into bilateral legs, right side is the worst        Initial Vitals:  Temp 97.3  F (36.3  C) (Skin)  Ht 1.549 m (5' 1\")  Wt 65.3 kg (144 lb)  BMI 27.21 kg/m2 Estimated body mass index is 27.21 kg/(m^2) as calculated from the following:    Height as of this encounter: 1.549 m (5' 1\").    Weight as of this encounter: 65.3 kg (144 lb).. Body surface area is 1.68 meters squared. BP completed using cuff size: NA (Not Taken)  Data Unavailable    Do you feel safe in your environment?  Yes  Do you need any refills today? No    Nursing Comments:         Louis Peters    "

## 2017-03-30 NOTE — PROGRESS NOTES
56-year-old female with cervical stenosis, bilateral arm pain.  She notes greater than three months of seven out of 10, aching neck pain, with radiation to the right greater than left arms.  She also feels weakness in the hands.  She has done physical therapy and injections without improvement.  She has also done numerous sessions with the chiropractor.         Past Medical History:   Diagnosis Date     Lumbago      Multiple sclerosis (H)      Unspecified hypothyroidism     Hypothyroidism     Past Surgical History:   Procedure Laterality Date     ESOPHAGOSCOPY, GASTROSCOPY, DUODENOSCOPY (EGD), COMBINED N/A 2016    Procedure: COMBINED ESOPHAGOSCOPY, GASTROSCOPY, DUODENOSCOPY (EGD);  Surgeon: Walter Rodriguez MD;  Location: [facility]     ESOPHAGOSCOPY, GASTROSCOPY, DUODENOSCOPY (EGD), COMBINED N/A 2/15/2016    Procedure: COMBINED ESOPHAGOSCOPY, GASTROSCOPY, DUODENOSCOPY (EGD);  Surgeon: Narendra Mendoza MD;  Location: [facility]     HC CLOSED TX TIBIAL SHAFT FX W/O MANIPULATION  2003    Closed reduction and long leg cast application of left tib-fib fx.     HC INJ EPIDURAL LUMBAR/SACRAL W/WO CONTRAST  2006    Steroid injection L4-5     HC TREATMENT MISSED  SURG, 1ST TRIMESTER      Suction dilatation and curettage     Social History     Social History     Marital status:      Spouse name: N/A     Number of children: N/A     Years of education: N/A     Occupational History     Not on file.     Social History Main Topics     Smoking status: Current Every Day Smoker     Packs/day: 0.50     Types: Cigarettes     Smokeless tobacco: Never Used     Alcohol use No     Drug use: No     Sexual activity: Yes     Partners: Male     Other Topics Concern     Not on file     Social History Narrative     Family History   Problem Relation Age of Onset     Hypertension Maternal Grandfather      CEREBROVASCULAR DISEASE Maternal Uncle      Arthritis Maternal Grandmother      Arthritis Maternal Grandfather       "Musculoskeletal Disorder Maternal Uncle      Thyroid Disease Sister         ROS: 10 point ROS neg other than the symptoms noted above in the HPI.    Physical Exam  Temp 97.3  F (36.3  C) (Skin)  Ht 1.549 m (5' 1\")  Wt 65.3 kg (144 lb)  BMI 27.21 kg/m2  HEENT:  Normocephalic, atraumatic.  PERRLA.  EOM s intact.  Visual fields full to gross exam  Neck:  Supple, non-tender, without lymphadenopathy.  Heart:  No peripheral edema  Lungs:  No SOB  Abdomen:  Non-distended.   Skin:  Warm and dry.  Extremities:  No edema, cyanosis or clubbing.    NEUROLOGICAL EXAMINATION:     Mental status:  Alert and Oriented x 3, speech is fluent.  Cranial nerves:  II-XII intact.   Motor:    Shoulder Abduction:  Right:  5/5   Left:  5/5  Biceps:                      Right:  5/5   Left:  5/5  Triceps:                     Right:  5/5   Left:  5/5  Wrist Extensors:       Right:  5/5   Left:  5/5  Wrist Flexors:           Right:  5/5   Left:  5/5  interosseus :            Right:  5/5   Left:  5/5   Hip Flexor:                Right: 5/5  Left:  5/5  Hip Adductor:             Right:  5/5  Left:  5/5  Hip Abductor:             Right:  5/5  Left:  5/5  Gastroc Soleus:        Right:  5/5  Left:  5/5  Tib/Ant:                      Right:  5/5  Left:  5/5  EHL:                     Right:  5/5  Left:  5/5  Sensation:  Intact  Reflexes:  Negative Babinski.  Negative Clonus.  Negative Escobedo's.  Coordination:  Smooth finger to nose testing.   Negative pronator drift.  Smooth tandem walking.    A/P:  56-year-old female with cervical stenosis, bilateral arm pain    I had a lengthy discussion with the patient, reviewing the history, symptoms and imaging  She has bilateral foraminal stenosis at C5 6 and C6 7  She has done extensive nonoperative management with failure to improve  We discussed that surgery would consist of C5 to 7 ACDFs  She will think this over, and call us back if she wishes to schedule     "

## 2017-03-31 DIAGNOSIS — M54.2 CERVICALGIA: ICD-10-CM

## 2017-03-31 RX ORDER — TRAMADOL HYDROCHLORIDE 50 MG/1
50 TABLET ORAL EVERY 6 HOURS PRN
Qty: 120 TABLET | Refills: 0 | Status: SHIPPED | OUTPATIENT
Start: 2017-03-31 | End: 2017-04-26

## 2017-03-31 NOTE — TELEPHONE ENCOUNTER
Tramadol      Last Written Prescription Date: 3/5/17  Last Fill Quantity: 120,  # refills: 0   Last Office Visit with [facility], P or [facility] prescribing provider: 3/8/17

## 2017-04-03 DIAGNOSIS — F33.0 MAJOR DEPRESSIVE DISORDER, RECURRENT EPISODE, MILD (H): ICD-10-CM

## 2017-04-03 NOTE — TELEPHONE ENCOUNTER
sertraline (ZOLOFT) 100 MG tablet   Last Written Prescription Date: 03/03/2017  Last Fill Quantity: 30, # refills: 0  Last Office Visit with [facility] primary care provider:  03/08/2017        Last PHQ-9 score on record=   PHQ-9 SCORE 3/8/2017   Total Score -   Total Score 12

## 2017-04-05 RX ORDER — SERTRALINE HYDROCHLORIDE 100 MG/1
TABLET, FILM COATED ORAL
Qty: 30 TABLET | Refills: 0 | Status: SHIPPED | OUTPATIENT
Start: 2017-04-05 | End: 2017-05-10

## 2017-04-05 NOTE — TELEPHONE ENCOUNTER
Routing refill request to provider for review/approval because:  PHQ-9 is out of range:  PHQ-9 is too high for RN to approve.     Almaz Lucas, RN, BSN

## 2017-04-11 ENCOUNTER — THERAPY VISIT (OUTPATIENT)
Dept: CHIROPRACTIC MEDICINE | Facility: CLINIC | Age: 57
End: 2017-04-11
Payer: COMMERCIAL

## 2017-04-11 DIAGNOSIS — M99.02 SEGMENTAL DYSFUNCTION OF THORACIC REGION: ICD-10-CM

## 2017-04-11 DIAGNOSIS — M54.2 CERVICALGIA: ICD-10-CM

## 2017-04-11 DIAGNOSIS — M54.12 BRACHIAL NEURITIS OR RADICULITIS: ICD-10-CM

## 2017-04-11 DIAGNOSIS — M99.03 SEGMENTAL DYSFUNCTION OF LUMBAR REGION: ICD-10-CM

## 2017-04-11 DIAGNOSIS — M99.01 SEGMENTAL DYSFUNCTION OF CERVICAL REGION: ICD-10-CM

## 2017-04-11 PROCEDURE — 98941 CHIROPRACT MANJ 3-4 REGIONS: CPT | Mod: AT | Performed by: CHIROPRACTOR

## 2017-04-11 NOTE — MR AVS SNAPSHOT
"              After Visit Summary   2017    Jaki Gómez    MRN: [id number]           Patient Information     Date Of Birth          1960        Visit Information        Provider Department      2017 1:15 PM Sharri Nicolas DC [facility]        Today's Diagnoses     Segmental dysfunction of cervical region        Segmental dysfunction of lumbar region        Brachial neuritis or radiculitis        Cervicalgia        Segmental dysfunction of thoracic region           Follow-ups after your visit        Who to contact     If you have questions or need follow up information about today's clinic visit or your schedule please contact [facility] directly at [phone].  Normal or non-critical lab and imaging results will be communicated to you by Curiouslyhart, letter or phone within 4 business days after the clinic has received the results. If you do not hear from us within 7 days, please contact the clinic through Curiouslyhart or phone. If you have a critical or abnormal lab result, we will notify you by phone as soon as possible.  Submit refill requests through Voltaire or call your pharmacy and they will forward the refill request to us. Please allow 3 business days for your refill to be completed.          Additional Information About Your Visit        MyChart Information     Voltaire lets you send messages to your doctor, view your test results, renew your prescriptions, schedule appointments and more. To sign up, go to [email or web address] . Click on \"Log in\" on the left side of the screen, which will take you to the Welcome page. Then click on \"Sign up Now\" on the right side of the page.     You will be asked to enter the access code listed below, as well as some personal information. Please follow the directions to create your username and password.     Your access code is: [email or web address]  Expires: 2017  3:13 PM     Your access code will  in 90 " days. If you need help or a new code, please call your [place] clinic or [phone].        Care EveryWhere ID     This is your Care EveryWhere ID. This could be used by other organizations to access your [place] medical records  [id number]         Blood Pressure from Last 3 Encounters:   03/08/17 130/80   09/02/16 133/83   02/19/16 110/70    Weight from Last 3 Encounters:   03/30/17 65.3 kg (144 lb)   03/08/17 64 kg (141 lb)   09/02/16 59 kg (130 lb)              We Performed the Following     CHIROPRAC MANIP,SPINAL,3-4 REGIONS        Primary Care Provider Office Phone # Fax #    Narendra Peters [phone]       [facility] [address] [place] [address]        Thank you!     Thank you for choosing [facility]  for your care. Our goal is always to provide you with excellent care. Hearing back from our patients is one way we can continue to improve our services. Please take a few minutes to complete the written survey that you may receive in the mail after your visit with us. Thank you!             Your Updated Medication List - Protect others around you: Learn how to safely use, store and throw away your medicines at www.disposemymeds.org.          This list is accurate as of: 4/11/17  1:24 PM.  Always use your most recent med list.                   Brand Name Dispense Instructions for use    gabapentin 300 MG capsule    NEURONTIN    90 capsule    TAKE ONE CAPSULE BY MOUTH THREE TIMES A DAY       levothyroxine 125 MCG tablet    SYNTHROID/LEVOTHROID    30 tablet    TAKE ONE TABLET BY MOUTH EVERY DAY       MULTIVITAMIN ADULT PO      Take 1 tablet by mouth daily       sertraline 100 MG tablet    ZOLOFT    30 tablet    TAKE ONE TABLET BY MOUTH EVERY DAY (FOLLOW-UP APPOINTMENT NEEDED FOR FURTHER REFILLS)       traMADol 50 MG tablet    ULTRAM    120 tablet    Take 1 tablet (50 mg) by mouth every 6 hours as needed       TYLENOL 325 MG tablet    Generic drug:  acetaminophen     100 tablet    Take 2 tablets (650 mg) by mouth every 4 hours as needed

## 2017-04-11 NOTE — PROGRESS NOTES
Visit #:  38    Subjective:  Jaki Gómez is a 55 year old female who is seen in f/u up for:        Segmental dysfunction of cervical region  Segmental dysfunction of lumbar region  Brachial neuritis or radiculitis  Cervicalgia  Segmental dysfunction of thoracic region.     Since last visit on 3/20/2017,  Jaki Gómez reports: that she is the same. Her back was bothering her yesterday, but it is better today. The pain was in her lower back, rated 5/10. She states they want to do surgery on her neck. Her symptoms are getting worse into her hands.       Review of Lumbar MRI 7/26/2016:  IMPRESSION:   1. Broad-based disc protrusion at L4-L5 extending to both sides of the  midline which results in mild central stenosis.  2. Mild facet joint disease L4-L5 in the lumbosacral levels.  3. The degenerative findings are new since the CT of 3/16/2005.    With central stenosis at L4-5, we will add Flexion/Distraction today.       ADL's : ADLs have improved with injections.     Objective:  The following was observed:    P: pain elicited on palpation, bilateral lower lumbars  A: static palpation demonstrates intersegmental asymmetry, as noted  R: motion palpation notes restricted motion  T: hypertonicity at:  Paraspinals R>>L    Segmental spinal dysfunction/restrictions found at:  C2 RR, LRR  C5 LR, RRR  T4 E, FR  T9 E, FR  L4 RR, LRR  Left SI joint posterior          Assessment:    Diagnoses:      1. Segmental dysfunction of cervical region    2. Segmental dysfunction of lumbar region    3. Brachial neuritis or radiculitis    4. Cervicalgia    5. Segmental dysfunction of thoracic region        Patient's condition:  Patient had restrictions pre-manipulation and Patient had decreased motion prior to manipulation    Treatment effectiveness:  Post manipulation there is better intersegmental movement and Patient claims to feel looser post manipulation      Procedures:  CMT:  73266 Chiropractic manipulative treatment 3-4 regions  performed   Cervical: Diversified, C2, C5, Supine  Thoracic: Diversified, T4, T10 Prone   Lumbar/Pelvis: Drop assist, L4, Left SI joint, Prone  Flexion Distraction of lumbar spine added today.       Modalities:  MSTM to affected musculature    Therapeutic procedures:  None      Prognosis: Good    Progress towards Goals: Patient had recent flare up of symptoms.    Recommendations:    Instructions:ice 20 minutes every other hour as needed and stretch as instructed at visit    Follow-up:  Return to care as needed.

## 2017-04-20 DIAGNOSIS — M54.2 CERVICALGIA: ICD-10-CM

## 2017-04-20 RX ORDER — CYCLOBENZAPRINE HCL 10 MG
TABLET ORAL
Qty: 90 TABLET | Refills: 3 | Status: SHIPPED | OUTPATIENT
Start: 2017-04-20 | End: 2017-12-12

## 2017-04-26 DIAGNOSIS — M54.2 CERVICALGIA: ICD-10-CM

## 2017-04-26 NOTE — TELEPHONE ENCOUNTER
Tramadol       Last Written Prescription Date: 03/31/2017  Last Fill Quantity: 120,  # refills: 0   Last Office Visit with [facility], [facility] or [facility] prescribing provider: 03/08/2017    Thanks  Anne Sorto [facility]   [phone]

## 2017-04-27 RX ORDER — TRAMADOL HYDROCHLORIDE 50 MG/1
50 TABLET ORAL EVERY 6 HOURS PRN
Qty: 120 TABLET | Refills: 0 | Status: SHIPPED | OUTPATIENT
Start: 2017-05-01 | End: 2017-05-26

## 2017-05-03 ENCOUNTER — THERAPY VISIT (OUTPATIENT)
Dept: CHIROPRACTIC MEDICINE | Facility: CLINIC | Age: 57
End: 2017-05-03
Payer: COMMERCIAL

## 2017-05-03 DIAGNOSIS — M54.12 BRACHIAL NEURITIS OR RADICULITIS: ICD-10-CM

## 2017-05-03 DIAGNOSIS — M99.01 SEGMENTAL DYSFUNCTION OF CERVICAL REGION: ICD-10-CM

## 2017-05-03 DIAGNOSIS — M99.04 SEGMENTAL DYSFUNCTION OF SACRAL REGION: Primary | ICD-10-CM

## 2017-05-03 DIAGNOSIS — M99.02 SEGMENTAL DYSFUNCTION OF THORACIC REGION: ICD-10-CM

## 2017-05-03 DIAGNOSIS — M99.03 SEGMENTAL DYSFUNCTION OF LUMBAR REGION: ICD-10-CM

## 2017-05-03 DIAGNOSIS — M54.2 CERVICALGIA: ICD-10-CM

## 2017-05-03 PROCEDURE — 98941 CHIROPRACT MANJ 3-4 REGIONS: CPT | Mod: AT | Performed by: CHIROPRACTOR

## 2017-05-03 NOTE — PROGRESS NOTES
"Visit #:  39    Subjective:  Jaki Gómez is a 55 year old female who is seen in f/u up for:        Segmental dysfunction of cervical region  Segmental dysfunction of lumbar region  Brachial neuritis or radiculitis  Cervicalgia  Segmental dysfunction of thoracic region.     Since last visit on 4/11/2017,  Jaki Gómez reports: that she has been in a lot of pain. Yesterday was a 10/10. She has been this way for about 4 days and hasn't been able to do anything. She has been doing her ball exercises, and that doesn't seem to be helping. She feels like her \"back is out of place and she is leaning on it when standing.\" Her pain is rated 6/10 today. She states that her neck has been hurting but she hasn't noticed it as much with her back pain, her symptoms in her right hand are constant.        ADL's : ADLs have been difficult with exacerbation of pain.        Objective:  The following was observed:    P: pain elicited on palpation, bilateral lower lumbars  A: static palpation demonstrates intersegmental asymmetry, as noted  R: motion palpation notes restricted motion  T: hypertonicity at:  Paraspinals R>>L    Segmental spinal dysfunction/restrictions found at:  C2 LR, RRR  C5 RR, LRR  T4 E, FR  T9 E, FR  L4 LR, RRR  Right SI joint posterior  Flexion/Distraction of Lumbar spine.          Assessment:    Diagnoses:      1. Segmental dysfunction of cervical region    2. Segmental dysfunction of lumbar region    3. Brachial neuritis or radiculitis    4. Cervicalgia    5. Segmental dysfunction of thoracic region        Patient's condition:  Patient had restrictions pre-manipulation and Patient had decreased motion prior to manipulation    Treatment effectiveness:  Post manipulation there is better intersegmental movement and Patient claims to feel looser post manipulation      Procedures:  CMT:  76470 Chiropractic manipulative treatment 3-4 regions performed   Cervical: Diversified, C2, C5, Supine  Thoracic: Diversified, " T4, T10 Prone   Lumbar/Pelvis: Drop assist, L4, Right SI joint, Prone  Flexion Distraction of lumbar spine added today.       Modalities:  MSTM to affected musculature    Therapeutic procedures:  None      Prognosis: Good    Progress towards Goals: Patient had recent flare up of symptoms.    Recommendations:    Instructions:ice 20 minutes every other hour as needed and stretch as instructed at visit    Follow-up:  Return to care early next week.

## 2017-05-10 DIAGNOSIS — F33.0 MAJOR DEPRESSIVE DISORDER, RECURRENT EPISODE, MILD (H): ICD-10-CM

## 2017-05-10 NOTE — TELEPHONE ENCOUNTER
sertraline (ZOLOFT) 100 MG tablet     Last Written Prescription Date: 4/5/17  Last Fill Quantity: 30, # refills: 0  Last Office Visit with [facility] primary care provider:  2/19/16        Last PHQ-9 score on record=   PHQ-9 SCORE 3/8/2017   Total Score -   Total Score 12

## 2017-05-12 RX ORDER — SERTRALINE HYDROCHLORIDE 100 MG/1
TABLET, FILM COATED ORAL
Qty: 30 TABLET | Refills: 0 | Status: SHIPPED | OUTPATIENT
Start: 2017-05-12 | End: 2017-06-15

## 2017-05-12 NOTE — TELEPHONE ENCOUNTER
Routing refill request to provider for review/approval because:  PHQ-9 >5  Ghada Peters RN

## 2017-05-19 ENCOUNTER — THERAPY VISIT (OUTPATIENT)
Dept: CHIROPRACTIC MEDICINE | Facility: CLINIC | Age: 57
End: 2017-05-19
Payer: COMMERCIAL

## 2017-05-19 DIAGNOSIS — M54.2 CERVICALGIA: ICD-10-CM

## 2017-05-19 DIAGNOSIS — M54.12 BRACHIAL NEURITIS OR RADICULITIS: ICD-10-CM

## 2017-05-19 DIAGNOSIS — M99.03 SEGMENTAL DYSFUNCTION OF LUMBAR REGION: ICD-10-CM

## 2017-05-19 DIAGNOSIS — M99.02 SEGMENTAL DYSFUNCTION OF THORACIC REGION: ICD-10-CM

## 2017-05-19 DIAGNOSIS — M99.01 SEGMENTAL DYSFUNCTION OF CERVICAL REGION: ICD-10-CM

## 2017-05-19 PROCEDURE — 98941 CHIROPRACT MANJ 3-4 REGIONS: CPT | Mod: AT | Performed by: CHIROPRACTOR

## 2017-05-19 NOTE — PROGRESS NOTES
Visit #:  40    Subjective:  Jaki Gómez is a 55 year old female who is seen in f/u up for:        Segmental dysfunction of cervical region  Segmental dysfunction of lumbar region  Brachial neuritis or radiculitis  Cervicalgia  Segmental dysfunction of thoracic region.     Since last visit on 5/3/2017,  Jaki Gómez reports: that she has been having some lower back pain. She rates her pain 5/10. She states that she ached horribly for 4-5 days after she was here last, but then completely resolved. She has been having issues with her allergies. She is inquiring about a medical massage referral so that her insurance will cover it.         ADL's : ADLs have been difficult with exacerbation of pain.        Objective:  The following was observed:    P: pain elicited on palpation, bilateral lower lumbars  A: static palpation demonstrates intersegmental asymmetry, as noted  R: motion palpation notes restricted motion  T: hypertonicity at:  Paraspinals R>>L    Segmental spinal dysfunction/restrictions found at:  C2 LR, RRR  C5 RR, LRR  T1 RR, LRR  T5 E, FR  T9 E, FR  L4 LR, RRR  Right SI joint posterior  Flexion/Distraction of Lumbar spine.          Assessment:    Diagnoses:      1. Segmental dysfunction of cervical region    2. Segmental dysfunction of lumbar region    3. Brachial neuritis or radiculitis    4. Cervicalgia    5. Segmental dysfunction of thoracic region        Patient's condition:  Patient had restrictions pre-manipulation and Patient had decreased motion prior to manipulation    Treatment effectiveness:  Post manipulation there is better intersegmental movement and Patient claims to feel looser post manipulation      Procedures:  CMT:  90436 Chiropractic manipulative treatment 3-4 regions performed   Cervical: Diversified, C2, C5, Supine  Thoracic: Diversified, T1, T5, T10 Prone   Lumbar/Pelvis: Drop assist, L4, Right SI joint, Prone  Flexion Distraction of lumbar spine added today.        Modalities:  MSTM to affected musculature    Therapeutic procedures:  None      Prognosis: Good    Progress towards Goals: Patient had recent flare up of symptoms.    Recommendations:    Instructions:ice 20 minutes every other hour as needed and stretch as instructed at visit    Follow-up:  Return to care as needed.

## 2017-05-26 DIAGNOSIS — M54.2 CERVICALGIA: ICD-10-CM

## 2017-05-26 RX ORDER — TRAMADOL HYDROCHLORIDE 50 MG/1
50 TABLET ORAL EVERY 6 HOURS PRN
Qty: 120 TABLET | Refills: 0 | Status: SHIPPED | OUTPATIENT
Start: 2017-05-26 | End: 2017-06-26

## 2017-05-26 NOTE — TELEPHONE ENCOUNTER
Tramadol 50mg      Last Written Prescription Date: 5/1/2017  Last Fill Quantity: 120,  # refills: 0   Last Office Visit with [facility], [facility] or [facility] prescribing provider: 3/8/2017                                             Please fax or bring signed prescription to [facility].    Thank you,  Hallie Lucas [facility]

## 2017-06-06 ENCOUNTER — THERAPY VISIT (OUTPATIENT)
Dept: CHIROPRACTIC MEDICINE | Facility: CLINIC | Age: 57
End: 2017-06-06
Payer: COMMERCIAL

## 2017-06-06 DIAGNOSIS — M99.01 SEGMENTAL DYSFUNCTION OF CERVICAL REGION: ICD-10-CM

## 2017-06-06 DIAGNOSIS — M99.03 SEGMENTAL DYSFUNCTION OF LUMBAR REGION: ICD-10-CM

## 2017-06-06 DIAGNOSIS — M54.12 BRACHIAL NEURITIS OR RADICULITIS: ICD-10-CM

## 2017-06-06 DIAGNOSIS — M99.02 SEGMENTAL DYSFUNCTION OF THORACIC REGION: ICD-10-CM

## 2017-06-06 DIAGNOSIS — M54.2 CERVICALGIA: ICD-10-CM

## 2017-06-06 PROCEDURE — 98941 CHIROPRACT MANJ 3-4 REGIONS: CPT | Mod: AT | Performed by: CHIROPRACTOR

## 2017-06-06 NOTE — PROGRESS NOTES
Visit #:  41    Subjective:  Jaki Gómez is a 55 year old female who is seen in f/u up for:        Segmental dysfunction of cervical region  Segmental dysfunction of lumbar region  Brachial neuritis or radiculitis  Cervicalgia  Segmental dysfunction of thoracic region.     Since last visit on 5/19/2017,  Jaki Gómez reports: that she has been feeling better after she leaves her now. She feels a bit of pain in her right lower back and left neck. The pain is rated 5/10, she hasn't had pain until yesterday.        ADL's : ADLs have been difficult with exacerbation of pain.        Objective:  The following was observed:    P: pain elicited on palpation, bilateral lower lumbars  A: static palpation demonstrates intersegmental asymmetry, as noted  R: motion palpation notes restricted motion  T: hypertonicity at:  Paraspinals R>>L    Segmental spinal dysfunction/restrictions found at:  C2 LR, RRR  C5 RR, LRR  T1 RR, LRR  T5 E, FR  T10 E, FR  L4 LR, RRR  Right SI joint posterior          Assessment:    Diagnoses:      1. Segmental dysfunction of cervical region    2. Segmental dysfunction of lumbar region    3. Brachial neuritis or radiculitis    4. Cervicalgia    5. Segmental dysfunction of thoracic region        Patient's condition:  Patient had restrictions pre-manipulation and Patient had decreased motion prior to manipulation    Treatment effectiveness:  Post manipulation there is better intersegmental movement and Patient claims to feel looser post manipulation      Procedures:  CMT:  55613 Chiropractic manipulative treatment 3-4 regions performed   Cervical: Diversified, C2, C5, Supine  Thoracic: Diversified, T1, T5, T10 Prone   Lumbar/Pelvis: Drop assist, L4, Right SI joint, Prone      Modalities:  MSTM to affected musculature    Therapeutic procedures:  None      Prognosis: Good    Progress towards Goals: Patient has been improving with care.     Recommendations:    Instructions:ice 20 minutes every other  hour as needed and stretch as instructed at visit    Follow-up:  Return to care as needed.

## 2017-06-06 NOTE — MR AVS SNAPSHOT
"              After Visit Summary   2017    Jaki Gómez    MRN: [id number]           Patient Information     Date Of Birth          1960        Visit Information        Provider Department      2017 1:00 PM Sharri Nicolas DC [place] [facility]        Today's Diagnoses     Segmental dysfunction of cervical region        Segmental dysfunction of lumbar region        Brachial neuritis or radiculitis        Cervicalgia        Segmental dysfunction of thoracic region           Follow-ups after your visit        Who to contact     If you have questions or need follow up information about today's clinic visit or your schedule please contact [facility] directly at [phone].  Normal or non-critical lab and imaging results will be communicated to you by SharePlowhart, letter or phone within 4 business days after the clinic has received the results. If you do not hear from us within 7 days, please contact the clinic through SharePlowhart or phone. If you have a critical or abnormal lab result, we will notify you by phone as soon as possible.  Submit refill requests through Teravac or call your pharmacy and they will forward the refill request to us. Please allow 3 business days for your refill to be completed.          Additional Information About Your Visit        MyChart Information     Teravac lets you send messages to your doctor, view your test results, renew your prescriptions, schedule appointments and more. To sign up, go to [email or web address] . Click on \"Log in\" on the left side of the screen, which will take you to the Welcome page. Then click on \"Sign up Now\" on the right side of the page.     You will be asked to enter the access code listed below, as well as some personal information. Please follow the directions to create your username and password.     Your access code is: [email or web address]  Expires: 2017  2:06 PM     Your access code will  in 90 " days. If you need help or a new code, please call your [place] clinic or [phone].        Care EveryWhere ID     This is your Care EveryWhere ID. This could be used by other organizations to access your [place] medical records  [id number]         Blood Pressure from Last 3 Encounters:   03/08/17 130/80   09/02/16 133/83   02/19/16 110/70    Weight from Last 3 Encounters:   03/30/17 65.3 kg (144 lb)   03/08/17 64 kg (141 lb)   09/02/16 59 kg (130 lb)              We Performed the Following     CHIROPRAC MANIP,SPINAL,3-4 REGIONS        Primary Care Provider Office Phone # Fax #    Narendra Peters [phone]       [facility] [address] [place] [address]        Thank you!     Thank you for choosing [place] [facility]  for your care. Our goal is always to provide you with excellent care. Hearing back from our patients is one way we can continue to improve our services. Please take a few minutes to complete the written survey that you may receive in the mail after your visit with us. Thank you!             Your Updated Medication List - Protect others around you: Learn how to safely use, store and throw away your medicines at www.disposemymeds.org.          This list is accurate as of: 6/6/17  1:04 PM.  Always use your most recent med list.                   Brand Name Dispense Instructions for use    cyclobenzaprine 10 MG tablet    FLEXERIL    90 tablet    TAKE ONE TABLET BY MOUTH THREE TIMES A DAY AS NEEDED FOR MUSCLE SPASMS       gabapentin 300 MG capsule    NEURONTIN    90 capsule    TAKE ONE CAPSULE BY MOUTH THREE TIMES A DAY       levothyroxine 125 MCG tablet    SYNTHROID/LEVOTHROID    30 tablet    TAKE ONE TABLET BY MOUTH EVERY DAY       MULTIVITAMIN ADULT PO      Take 1 tablet by mouth daily       sertraline 100 MG tablet    ZOLOFT    30 tablet    TAKE ONE TABLET BY MOUTH EVERY DAY (FOLLOW-UP APPOINTMENT NEEDED FOR FURTHER REFILLS)        traMADol 50 MG tablet    ULTRAM    120 tablet    Take 1 tablet (50 mg) by mouth every 6 hours as needed       TYLENOL 325 MG tablet   Generic drug:  acetaminophen     100 tablet    Take 2 tablets (650 mg) by mouth every 4 hours as needed

## 2017-06-26 DIAGNOSIS — M54.2 CERVICALGIA: ICD-10-CM

## 2017-06-26 RX ORDER — TRAMADOL HYDROCHLORIDE 50 MG/1
50 TABLET ORAL EVERY 6 HOURS PRN
Qty: 120 TABLET | Refills: 0 | Status: SHIPPED | OUTPATIENT
Start: 2017-06-26 | End: 2017-07-24

## 2017-06-26 NOTE — TELEPHONE ENCOUNTER
Tramadol 50mg      Last Written Prescription Date: 05/26/2017  Last Fill Quantity: 120,  # refills: 0   Last Office Visit with [facility], [facility] or [facility] prescribing provider: 03/08/2017    Irina Harding CPhT  [facility] [place]  [phone]

## 2017-07-12 ENCOUNTER — THERAPY VISIT (OUTPATIENT)
Dept: CHIROPRACTIC MEDICINE | Facility: CLINIC | Age: 57
End: 2017-07-12
Payer: COMMERCIAL

## 2017-07-12 DIAGNOSIS — M99.02 SEGMENTAL DYSFUNCTION OF THORACIC REGION: ICD-10-CM

## 2017-07-12 DIAGNOSIS — M99.03 SEGMENTAL DYSFUNCTION OF LUMBAR REGION: ICD-10-CM

## 2017-07-12 DIAGNOSIS — M54.2 CERVICALGIA: ICD-10-CM

## 2017-07-12 DIAGNOSIS — M99.01 SEGMENTAL DYSFUNCTION OF CERVICAL REGION: ICD-10-CM

## 2017-07-12 DIAGNOSIS — M54.12 BRACHIAL NEURITIS OR RADICULITIS: ICD-10-CM

## 2017-07-12 DIAGNOSIS — M99.04 SEGMENTAL DYSFUNCTION OF SACRAL REGION: Primary | ICD-10-CM

## 2017-07-12 PROCEDURE — 98941 CHIROPRACT MANJ 3-4 REGIONS: CPT | Mod: AT | Performed by: CHIROPRACTOR

## 2017-07-12 NOTE — PROGRESS NOTES
"Visit #:  1 of 12    Subjective:  Jaki Gómez is a 55 year old female who is seen in f/u up for:        Segmental dysfunction of cervical region  Segmental dysfunction of lumbar region  Brachial neuritis or radiculitis  Cervicalgia  Segmental dysfunction of thoracic region.     Since last visit on 6/6/2017,  Jaki Gómez reports: that she went camping over the 4th and she is very sore. Her lower back \"went out\" and she couldn't even walk at times. Her truck broke down. The pain is in her right lower back, rated 6/10. She is sleeping okay at night.       ADL's : ADLs have been difficult with exacerbation of pain.        Objective:  The following was observed:    P: pain elicited on palpation, Right lower back  A: static palpation demonstrates intersegmental asymmetry, as noted  R: motion palpation notes restricted motion  T: hypertonicity at:  Paraspinals R>>L    Segmental spinal dysfunction/restrictions found at:  C2 LR, RRR  C5 RR, LRR  T1 RR, LRR  T7 E, FR  L4 LR, RRR  Right SI joint posterior          Assessment:    Diagnoses:      1. Segmental dysfunction of cervical region    2. Segmental dysfunction of lumbar region    3. Brachial neuritis or radiculitis    4. Cervicalgia    5. Segmental dysfunction of thoracic region        Patient's condition:  Patient had restrictions pre-manipulation and Patient had decreased motion prior to manipulation    Treatment effectiveness:  Post manipulation there is better intersegmental movement and Patient claims to feel looser post manipulation      Procedures:  CMT:  87030 Chiropractic manipulative treatment 3-4 regions performed   Cervical: Diversified, C2, C5, Supine  Thoracic: Diversified, T1, T7, Prone   Lumbar/Pelvis: Drop assist, L4, Right SI joint, Prone      Modalities:  MSTM to affected musculature    Therapeutic procedures:  None      Prognosis: Good    Progress towards Goals: Patient has been improving with care.     Recommendations:    Instructions:ice 20 " minutes every other hour as needed and stretch as instructed at visit    Follow-up:  Return to care as needed.

## 2017-07-12 NOTE — MR AVS SNAPSHOT
"              After Visit Summary   7/12/2017    Jaki Gómez    MRN: [id number]           Patient Information     Date Of Birth          1960        Visit Information        Provider Department      7/12/2017 2:30 PM Sharri Nicolas DC [facility]        Today's Diagnoses     Segmental dysfunction of sacral region    -  1    Segmental dysfunction of cervical region        Segmental dysfunction of lumbar region        Brachial neuritis or radiculitis        Cervicalgia        Segmental dysfunction of thoracic region           Follow-ups after your visit        Who to contact     If you have questions or need follow up information about today's clinic visit or your schedule please contact [facility] directly at [phone].  Normal or non-critical lab and imaging results will be communicated to you by Wanamakerhart, letter or phone within 4 business days after the clinic has received the results. If you do not hear from us within 7 days, please contact the clinic through Wanamakerhart or phone. If you have a critical or abnormal lab result, we will notify you by phone as soon as possible.  Submit refill requests through Sway or call your pharmacy and they will forward the refill request to us. Please allow 3 business days for your refill to be completed.          Additional Information About Your Visit        MyChart Information     Sway lets you send messages to your doctor, view your test results, renew your prescriptions, schedule appointments and more. To sign up, go to [email or web address] . Click on \"Log in\" on the left side of the screen, which will take you to the Welcome page. Then click on \"Sign up Now\" on the right side of the page.     You will be asked to enter the access code listed below, as well as some personal information. Please follow the directions to create your username and password.     Your access code is: [email or web address]  Expires: 8/17/2017 "  2:06 PM     Your access code will  in 90 days. If you need help or a new code, please call your [place] clinic or [phone].        Care EveryWhere ID     This is your Care EveryWhere ID. This could be used by other organizations to access your [place] medical records  [id number]         Blood Pressure from Last 3 Encounters:   17 130/80   16 133/83   16 110/70    Weight from Last 3 Encounters:   17 65.3 kg (144 lb)   17 64 kg (141 lb)   16 59 kg (130 lb)              We Performed the Following     CHIROPRAC MANIP,SPINAL,3-4 REGIONS        Primary Care Provider Office Phone # Fax #    Narendra Peters [phone]       [facility] [address] [place] [address]        Equal Access to Services     [facility]: Hadii aad ku hadasho Soomaali, waaxda luqadaha, qaybta kaalmada adeegyada, waxay idiin hayaan jerald hammond . So [facility] [phone].    ATENCIÓN: Si habla español, tiene a ferrari disposición servicios gratuitos de asistencia lingüística. Myron al [phone].    We comply with applicable federal civil rights laws and [place] laws. We do not discriminate on the basis of race, color, national origin, age, disability sex, sexual orientation or gender identity.            Thank you!     Thank you for choosing [place] [facility]  for your care. Our goal is always to provide you with excellent care. Hearing back from our patients is one way we can continue to improve our services. Please take a few minutes to complete the written survey that you may receive in the mail after your visit with us. Thank you!             Your Updated Medication List - Protect others around you: Learn how to safely use, store and throw away your medicines at www.disposemymeds.org.          This list is accurate as of: 17  2:42 PM.  Always use your most recent med list.                   Brand Name Dispense  Instructions for use Diagnosis    cyclobenzaprine 10 MG tablet    FLEXERIL    90 tablet    TAKE ONE TABLET BY MOUTH THREE TIMES A DAY AS NEEDED FOR MUSCLE SPASMS    Cervicalgia       gabapentin 300 MG capsule    NEURONTIN    90 capsule    TAKE ONE CAPSULE BY MOUTH THREE TIMES A DAY    Multiple sclerosis (H)       levothyroxine 125 MCG tablet    SYNTHROID/LEVOTHROID    30 tablet    TAKE ONE TABLET BY MOUTH EVERY DAY    Hypothyroidism, unspecified type       MULTIVITAMIN ADULT PO      Take 1 tablet by mouth daily        sertraline 100 MG tablet    ZOLOFT    30 tablet    TAKE ONE TABLET BY MOUTH EVERY DAY (FOLLOW-UP APPOINTMENT NEEDED FOR FURTHER REFILLS)    Major depressive disorder, recurrent episode, mild (H)       traMADol 50 MG tablet    ULTRAM    120 tablet    Take 1 tablet (50 mg) by mouth every 6 hours as needed    Cervicalgia       TYLENOL 325 MG tablet   Generic drug:  acetaminophen     100 tablet    Take 2 tablets (650 mg) by mouth every 4 hours as needed    Gastrointestinal hemorrhage associated with duodenal ulcer

## 2017-07-24 DIAGNOSIS — M54.2 CERVICALGIA: ICD-10-CM

## 2017-07-24 DIAGNOSIS — E03.9 HYPOTHYROIDISM, UNSPECIFIED TYPE: ICD-10-CM

## 2017-07-24 RX ORDER — TRAMADOL HYDROCHLORIDE 50 MG/1
50 TABLET ORAL EVERY 6 HOURS PRN
Qty: 120 TABLET | Refills: 0 | Status: SHIPPED | OUTPATIENT
Start: 2017-07-24 | End: 2017-08-20

## 2017-07-24 NOTE — TELEPHONE ENCOUNTER
levothyroxine (SYNTHROID/LEVOTHROID) 125 MCG tablet     Last Written Prescription Date: 12/1/16  Last Quantity: 30, # refills: 6  Last Office Visit with [facility], [facility] or [facility] prescribing provider: 3/8/17        TSH   Date Value Ref Range Status   06/17/2015 2.17 0.40 - 4.00 mU/L Final

## 2017-07-24 NOTE — TELEPHONE ENCOUNTER
Tramadol   Last Written Prescription Date: 6/26/17  Last Fill Quantity: 120,  # refills: 0   Last Office Visit with [facility], [facility] or [facility] prescribing provider: 3/30/17    Yamile Arthur  Pharmacy [facility].  [facility]  [phone]

## 2017-07-26 RX ORDER — LEVOTHYROXINE SODIUM 125 UG/1
TABLET ORAL
Qty: 30 TABLET | Refills: 6 | Status: SHIPPED | OUTPATIENT
Start: 2017-07-26 | End: 2018-05-09

## 2017-07-26 NOTE — TELEPHONE ENCOUNTER
Routing refill request to provider for review/approval because:  Labs not current:  TSH  Danay Carter RN

## 2017-08-04 ENCOUNTER — THERAPY VISIT (OUTPATIENT)
Dept: CHIROPRACTIC MEDICINE | Facility: CLINIC | Age: 57
End: 2017-08-04
Payer: COMMERCIAL

## 2017-08-04 DIAGNOSIS — M99.02 SEGMENTAL DYSFUNCTION OF THORACIC REGION: ICD-10-CM

## 2017-08-04 DIAGNOSIS — M54.2 CERVICALGIA: ICD-10-CM

## 2017-08-04 DIAGNOSIS — M99.03 SEGMENTAL DYSFUNCTION OF LUMBAR REGION: ICD-10-CM

## 2017-08-04 DIAGNOSIS — M54.12 BRACHIAL NEURITIS OR RADICULITIS: ICD-10-CM

## 2017-08-04 DIAGNOSIS — M99.04 SOMATIC DYSFUNCTION OF SACROILIAC JOINT: Primary | ICD-10-CM

## 2017-08-04 DIAGNOSIS — M99.01 SEGMENTAL DYSFUNCTION OF CERVICAL REGION: ICD-10-CM

## 2017-08-04 PROCEDURE — 98941 CHIROPRACT MANJ 3-4 REGIONS: CPT | Mod: AT | Performed by: CHIROPRACTOR

## 2017-08-04 NOTE — MR AVS SNAPSHOT
"              After Visit Summary   8/4/2017    Jaki Gómez    MRN: [id number]           Patient Information     Date Of Birth          1960        Visit Information        Provider Department      8/4/2017 4:00 PM Sharri Nicolas DC [facility]        Today's Diagnoses     Somatic dysfunction of sacroiliac joint    -  1    Segmental dysfunction of cervical region        Segmental dysfunction of lumbar region        Brachial neuritis or radiculitis        Cervicalgia        Segmental dysfunction of thoracic region           Follow-ups after your visit        Who to contact     If you have questions or need follow up information about today's clinic visit or your schedule please contact [facility] directly at [phone].  Normal or non-critical lab and imaging results will be communicated to you by MyChart, letter or phone within 4 business days after the clinic has received the results. If you do not hear from us within 7 days, please contact the clinic through "[employer]hart or phone. If you have a critical or abnormal lab result, we will notify you by phone as soon as possible.  Submit refill requests through GroovinAds or call your pharmacy and they will forward the refill request to us. Please allow 3 business days for your refill to be completed.          Additional Information About Your Visit        MyChart Information     GroovinAds lets you send messages to your doctor, view your test results, renew your prescriptions, schedule appointments and more. To sign up, go to [email or web address] . Click on \"Log in\" on the left side of the screen, which will take you to the Welcome page. Then click on \"Sign up Now\" on the right side of the page.     You will be asked to enter the access code listed below, as well as some personal information. Please follow the directions to create your username and password.     Your access code is: [email or web address]  Expires: 8/17/2017  " 2:06 PM     Your access code will  in 90 days. If you need help or a new code, please call your [place] clinic or [phone].        Care EveryWhere ID     This is your Care EveryWhere ID. This could be used by other organizations to access your [place] medical records  [id number]         Blood Pressure from Last 3 Encounters:   17 130/80   16 133/83   16 110/70    Weight from Last 3 Encounters:   17 65.3 kg (144 lb)   17 64 kg (141 lb)   16 59 kg (130 lb)              We Performed the Following     CHIROPRAC MANIP,SPINAL,3-4 REGIONS        Primary Care Provider Office Phone # Fax #    Narendra Peters [phone]       [facility] [address] [place] [address]        Equal Access to Services     [facility]: Hadii aad ku hadasho Soomaali, waaxda luqadaha, qaybta kaalmada adeegyada, waxay idiin hayaan jerald hammond . So [facility] [phone].    ATENCIÓN: Si habla español, tiene a ferrari disposición servicios gratuitos de asistencia lingüística. Myron al [phone].    We comply with applicable federal civil rights laws and [place] laws. We do not discriminate on the basis of race, color, national origin, age, disability sex, sexual orientation or gender identity.            Thank you!     Thank you for choosing [place] [facility]  for your care. Our goal is always to provide you with excellent care. Hearing back from our patients is one way we can continue to improve our services. Please take a few minutes to complete the written survey that you may receive in the mail after your visit with us. Thank you!             Your Updated Medication List - Protect others around you: Learn how to safely use, store and throw away your medicines at www.disposemymeds.org.          This list is accurate as of: 17  4:07 PM.  Always use your most recent med list.                   Brand Name Dispense Instructions  for use Diagnosis    cyclobenzaprine 10 MG tablet    FLEXERIL    90 tablet    TAKE ONE TABLET BY MOUTH THREE TIMES A DAY AS NEEDED FOR MUSCLE SPASMS    Cervicalgia       gabapentin 300 MG capsule    NEURONTIN    90 capsule    TAKE ONE CAPSULE BY MOUTH THREE TIMES A DAY    Multiple sclerosis (H)       levothyroxine 125 MCG tablet    SYNTHROID/LEVOTHROID    30 tablet    TAKE ONE TABLET BY MOUTH EVERY DAY    Hypothyroidism, unspecified type       MULTIVITAMIN ADULT PO      Take 1 tablet by mouth daily        sertraline 100 MG tablet    ZOLOFT    30 tablet    TAKE ONE TABLET BY MOUTH EVERY DAY (FOLLOW-UP APPOINTMENT NEEDED FOR FURTHER REFILLS)    Major depressive disorder, recurrent episode, mild (H)       traMADol 50 MG tablet    ULTRAM    120 tablet    Take 1 tablet (50 mg) by mouth every 6 hours as needed    Cervicalgia       TYLENOL 325 MG tablet   Generic drug:  acetaminophen     100 tablet    Take 2 tablets (650 mg) by mouth every 4 hours as needed    Gastrointestinal hemorrhage associated with duodenal ulcer

## 2017-08-04 NOTE — PROGRESS NOTES
"Visit #:  2 of 12    Subjective:  Jaki Gómez is a 55 year old female who is seen in f/u up for:        Segmental dysfunction of cervical region  Segmental dysfunction of lumbar region  Brachial neuritis or radiculitis  Cervicalgia  Segmental dysfunction of thoracic region.     Since last visit on 6/6/2017,  Jaki Gómez reports: that she is having a \"leg problem\" today that feels like she has a band around her right upper thigh. This began on Wednesday after she sat for too long. She also has right lower back pain, and it feels like it is connected to her right leg. She rates her pain 6/10, and she is limping significantly today. She is not sleeping well the last 2 nights because her leg is hurting so bad. The pain is just a solid clench around her upper thigh. She denies having any pain like this before in her leg or and weakness or tripping on her leg.     ADL's : ADLs have been difficult with exacerbation of pain.     Patient requested full spine adjusting        Objective:  The following was observed:    P: pain elicited on palpation, Right lower back  A: static palpation demonstrates intersegmental asymmetry, as noted  R: motion palpation notes restricted motion  T: hypertonicity at:  Paraspinals R>>L    Segmental spinal dysfunction/restrictions found at:  C2 RR, LRR  C5 LR, RRR  T10 E, FR  L1-L5- Mobilization- Flexion Distraction   L5 RR, LRR  Right SI joint posterior      Assessment:    Diagnoses:      1. Segmental dysfunction of cervical region    2. Segmental dysfunction of lumbar region    3. Brachial neuritis or radiculitis    4. Cervicalgia    5. Segmental dysfunction of thoracic region        Patient's condition:  Patient had restrictions pre-manipulation and Patient had decreased motion prior to manipulation    Treatment effectiveness:  Post manipulation there is better intersegmental movement and Patient claims to feel looser post manipulation      Procedures:  CMT:  67476 Chiropractic " manipulative treatment 3-4 regions performed   Cervical: Diversified, C2, C5, Supine  Thoracic: Diversified, T10, Prone   Lumbar: Drop assist, Flexion Distraction, L5, Prone  Pelvis: Drop Assist, Right SI joint, Prone      Modalities:  MSTM to affected musculature    Therapeutic procedures:  None      Prognosis: Good    Progress towards Goals: Patient has been improving with care.     Recommendations:    Instructions:ice 20 minutes every other hour as needed and stretch as instructed at visit    Follow-up:  Return to care as needed.

## 2017-08-07 DIAGNOSIS — F33.0 MAJOR DEPRESSIVE DISORDER, RECURRENT EPISODE, MILD (H): ICD-10-CM

## 2017-08-07 NOTE — TELEPHONE ENCOUNTER
sertraline (ZOLOFT) 100 MG tablet  Last Written Prescription Date: 06/19/2017  Last Fill Quantity: 30, # refills: 0  Last Office Visit with [facility] primary care provider:  03/08/2017        Last PHQ-9 score on record=   PHQ-9 SCORE 3/8/2017   Total Score -   Total Score 12

## 2017-08-09 NOTE — TELEPHONE ENCOUNTER
Routing refill request to provider for review/approval because:  Labs out of range:  PHQ-9  A break in medication  T'd up 1 month for provider    Will forward to schedulers to schedule patient for OV.  Pina De Leon RN

## 2017-08-10 RX ORDER — SERTRALINE HYDROCHLORIDE 100 MG/1
TABLET, FILM COATED ORAL
Qty: 30 TABLET | Refills: 0 | Status: SHIPPED | OUTPATIENT
Start: 2017-08-10 | End: 2017-10-13

## 2017-08-18 ENCOUNTER — THERAPY VISIT (OUTPATIENT)
Dept: CHIROPRACTIC MEDICINE | Facility: CLINIC | Age: 57
End: 2017-08-18
Payer: COMMERCIAL

## 2017-08-18 DIAGNOSIS — M99.01 SEGMENTAL DYSFUNCTION OF CERVICAL REGION: ICD-10-CM

## 2017-08-18 DIAGNOSIS — M99.04 SEGMENTAL DYSFUNCTION OF SACRAL REGION: Primary | ICD-10-CM

## 2017-08-18 DIAGNOSIS — M54.12 BRACHIAL NEURITIS OR RADICULITIS: ICD-10-CM

## 2017-08-18 DIAGNOSIS — M99.03 SEGMENTAL DYSFUNCTION OF LUMBAR REGION: ICD-10-CM

## 2017-08-18 DIAGNOSIS — M54.2 CERVICALGIA: ICD-10-CM

## 2017-08-18 DIAGNOSIS — M99.02 SEGMENTAL DYSFUNCTION OF THORACIC REGION: ICD-10-CM

## 2017-08-18 PROCEDURE — 98941 CHIROPRACT MANJ 3-4 REGIONS: CPT | Mod: AT | Performed by: CHIROPRACTOR

## 2017-08-18 NOTE — PROGRESS NOTES
"Visit #:  3 of 12    Subjective:  Jaki Gómez is a 55 year old female who is seen in f/u up for:        Segmental dysfunction of cervical region  Segmental dysfunction of lumbar region  Brachial neuritis or radiculitis  Cervicalgia  Segmental dysfunction of thoracic region.     Since last visit on 8/4/2017,  Jaki Gómez reports: that she is still having leg issues but it is \"definitely a nerve issue.\" She states that her left lower back is really bothering her, and her neck always bothers her. She has to sit in a particular way in order to get the pressure of her legs. She is leaving for vacation this Sunday for a week. She will be tenting for a week.       ADL's : ADLs have been difficult with exacerbation of pain.     Patient requested full spine adjusting.        Objective:  The following was observed:    P: pain elicited on palpation, Left lower back  A: static palpation demonstrates intersegmental asymmetry, as noted  R: motion palpation notes restricted motion  T: hypertonicity at:  Paraspinals R>>L    Segmental spinal dysfunction/restrictions found at:  C2 RR, LRR  C5 LR, RRR  T1 RR, LRR  T3 LR, RRR  T9 E, FR  L5 RR, LRR  Left SI joint posterior      Assessment:    Diagnoses:      1. Segmental dysfunction of cervical region    2. Segmental dysfunction of lumbar region    3. Brachial neuritis or radiculitis    4. Cervicalgia    5. Segmental dysfunction of thoracic region        Patient's condition:  Patient had restrictions pre-manipulation and Patient had decreased motion prior to manipulation    Treatment effectiveness:  Post manipulation there is better intersegmental movement and Patient claims to feel looser post manipulation      Procedures:  CMT:  86542 Chiropractic manipulative treatment 3-4 regions performed   Cervical: Diversified, C2, C5, Supine  Thoracic: Diversified, T1, T3, T9, Prone   Lumbar: Drop assist, Flexion Distraction, L5, Prone  Pelvis: Drop Assist, Left SI joint, " Prone      Modalities:  MSTM to affected musculature    Therapeutic procedures:  None      Prognosis: Good    Progress towards Goals: Patient has been improving with care.     Recommendations:    Instructions:ice 20 minutes every other hour as needed and stretch as instructed at visit    Follow-up:  Return to care after vacation.

## 2017-08-18 NOTE — MR AVS SNAPSHOT
"              After Visit Summary   8/18/2017    Jaki Gómez    MRN: [id number]           Patient Information     Date Of Birth          1960        Visit Information        Provider Department      8/18/2017 1:15 PM Sharri Nicolas DC [facility]        Today's Diagnoses     Segmental dysfunction of sacral region    -  1    Segmental dysfunction of cervical region        Segmental dysfunction of lumbar region        Brachial neuritis or radiculitis        Cervicalgia        Segmental dysfunction of thoracic region           Follow-ups after your visit        Who to contact     If you have questions or need follow up information about today's clinic visit or your schedule please contact [facility] directly at [phone].  Normal or non-critical lab and imaging results will be communicated to you by Boommy Fashionhart, letter or phone within 4 business days after the clinic has received the results. If you do not hear from us within 7 days, please contact the clinic through [employer] or phone. If you have a critical or abnormal lab result, we will notify you by phone as soon as possible.  Submit refill requests through [employer] or call your pharmacy and they will forward the refill request to us. Please allow 3 business days for your refill to be completed.          Additional Information About Your Visit        MyChart Information     [employer] lets you send messages to your doctor, view your test results, renew your prescriptions, schedule appointments and more. To sign up, go to [email or web address] . Click on \"Log in\" on the left side of the screen, which will take you to the Welcome page. Then click on \"Sign up Now\" on the right side of the page.     You will be asked to enter the access code listed below, as well as some personal information. Please follow the directions to create your username and password.     Your access code is: [email or web address]  Expires: " 2017  1:27 PM     Your access code will  in 90 days. If you need help or a new code, please call your [place] clinic or [phone].        Care EveryWhere ID     This is your Care EveryWhere ID. This could be used by other organizations to access your [place] medical records  [id number]         Blood Pressure from Last 3 Encounters:   17 130/80   16 133/83   16 110/70    Weight from Last 3 Encounters:   17 65.3 kg (144 lb)   17 64 kg (141 lb)   16 59 kg (130 lb)              We Performed the Following     CHIROPRAC MANIP,SPINAL,3-4 REGIONS        Primary Care Provider Office Phone # Fax #    Narendra Peters [phone]       [facility] [address] [place] [address]        Equal Access to Services     [facility] : Hadii aad ku hadasho Soomaali, waaxda luqadaha, qaybta kaalmada adeegyada, waxay bibiin haykaterynan jerald hammond . So [facility] [phone].    ATENCIÓN: Si habla español, tiene a ferrari disposición servicios gratuitos de asistencia lingüística. Llame al [phone].    We comply with applicable federal civil rights laws and [place] laws. We do not discriminate on the basis of race, color, national origin, age, disability sex, sexual orientation or gender identity.            Thank you!     Thank you for choosing [place] [facility]  for your care. Our goal is always to provide you with excellent care. Hearing back from our patients is one way we can continue to improve our services. Please take a few minutes to complete the written survey that you may receive in the mail after your visit with us. Thank you!             Your Updated Medication List - Protect others around you: Learn how to safely use, store and throw away your medicines at www.disposemymeds.org.          This list is accurate as of: 17  1:27 PM.  Always use your most recent med list.                   Brand Name Dispense  Instructions for use Diagnosis    cyclobenzaprine 10 MG tablet    FLEXERIL    90 tablet    TAKE ONE TABLET BY MOUTH THREE TIMES A DAY AS NEEDED FOR MUSCLE SPASMS    Cervicalgia       gabapentin 300 MG capsule    NEURONTIN    90 capsule    TAKE ONE CAPSULE BY MOUTH THREE TIMES A DAY    Multiple sclerosis (H)       levothyroxine 125 MCG tablet    SYNTHROID/LEVOTHROID    30 tablet    TAKE ONE TABLET BY MOUTH EVERY DAY    Hypothyroidism, unspecified type       MULTIVITAMIN ADULT PO      Take 1 tablet by mouth daily        sertraline 100 MG tablet    ZOLOFT    30 tablet    TAKE ONE TABLET BY MOUTH EVERY DAY (FOLLOW-UP APPOINTMENT NEEDED FOR FURTHER REFILLS)    Major depressive disorder, recurrent episode, mild (H)       traMADol 50 MG tablet    ULTRAM    120 tablet    Take 1 tablet (50 mg) by mouth every 6 hours as needed    Cervicalgia       TYLENOL 325 MG tablet   Generic drug:  acetaminophen     100 tablet    Take 2 tablets (650 mg) by mouth every 4 hours as needed    Gastrointestinal hemorrhage associated with duodenal ulcer

## 2017-08-20 DIAGNOSIS — M54.2 CERVICALGIA: ICD-10-CM

## 2017-08-20 NOTE — TELEPHONE ENCOUNTER
tramadol      Last Written Prescription Date: 07/24/2017  Last Fill Quantity: 120,  # refills: 0   Last Office Visit with [facility], [facility] or [facility] prescribing provider: 03/08/2017    Thank You,  Natalio Hightower, Pharmacy [facility]

## 2017-08-22 RX ORDER — TRAMADOL HYDROCHLORIDE 50 MG/1
50 TABLET ORAL EVERY 6 HOURS PRN
Qty: 120 TABLET | Refills: 0 | Status: SHIPPED | OUTPATIENT
Start: 2017-08-22 | End: 2017-08-23

## 2017-08-23 RX ORDER — TRAMADOL HYDROCHLORIDE 50 MG/1
50 TABLET ORAL EVERY 6 HOURS PRN
Qty: 120 TABLET | Refills: 0 | Status: SHIPPED | OUTPATIENT
Start: 2017-08-23 | End: 2017-09-18

## 2017-08-23 NOTE — TELEPHONE ENCOUNTER
Whitney calling from [facility] #[phone] in [place], patient needs this Rx, they are camping in [place] and this is the closest pharmacy,  but it need to be a new RX sent to them and the one sent to [facility] needs to be discontinued, please fax new Rx to #[phone] please call after faxed Thanks

## 2017-08-30 ENCOUNTER — THERAPY VISIT (OUTPATIENT)
Dept: CHIROPRACTIC MEDICINE | Facility: CLINIC | Age: 57
End: 2017-08-30
Payer: COMMERCIAL

## 2017-08-30 DIAGNOSIS — M54.12 BRACHIAL NEURITIS OR RADICULITIS: ICD-10-CM

## 2017-08-30 DIAGNOSIS — M99.03 SEGMENTAL DYSFUNCTION OF LUMBAR REGION: ICD-10-CM

## 2017-08-30 DIAGNOSIS — M99.01 SEGMENTAL DYSFUNCTION OF CERVICAL REGION: ICD-10-CM

## 2017-08-30 DIAGNOSIS — M99.02 SEGMENTAL DYSFUNCTION OF THORACIC REGION: ICD-10-CM

## 2017-08-30 DIAGNOSIS — M54.2 CERVICALGIA: ICD-10-CM

## 2017-08-30 PROCEDURE — 98941 CHIROPRACT MANJ 3-4 REGIONS: CPT | Mod: AT | Performed by: CHIROPRACTOR

## 2017-08-30 NOTE — PROGRESS NOTES
Visit #:  4 of 12    Subjective:  Jaki Gómez is a 55 year old female who is seen in f/u up for:        Segmental dysfunction of cervical region  Segmental dysfunction of lumbar region  Brachial neuritis or radiculitis  Cervicalgia  Segmental dysfunction of thoracic region.     Since last visit on 8/18/2017,  Jaki Gómez reports: that she has a horrible neck today. She just got back from a week long camping trip. She has slept in different beds and on the ground. The pain is on the right side, rated 4/10.       ADL's : ADLs have been difficult with exacerbation of pain.     Patient requested full spine adjusting.        Objective:  The following was observed:    P: pain elicited on palpation, Left lower back  A: static palpation demonstrates intersegmental asymmetry, as noted  R: motion palpation notes restricted motion  T: hypertonicity at:  Paraspinals R>>L    Segmental spinal dysfunction/restrictions found at:  C2 RR, LRR  C5 LR, RRR  T1 RR, LRR  T7 E, FR  L5 LR, RRR  Right SI joint posterior      Assessment:    Diagnoses:      1. Segmental dysfunction of cervical region    2. Segmental dysfunction of lumbar region    3. Brachial neuritis or radiculitis    4. Cervicalgia    5. Segmental dysfunction of thoracic region        Patient's condition:  Patient had restrictions pre-manipulation and Patient had decreased motion prior to manipulation    Treatment effectiveness:  Post manipulation there is better intersegmental movement and Patient claims to feel looser post manipulation      Procedures:  CMT:  20452 Chiropractic manipulative treatment 3-4 regions performed   Cervical: Diversified, C2, C5, Supine  Thoracic: Diversified, T1, T7, Prone   Lumbar: Drop assist, Flexion Distraction, L5, Prone  Pelvis: Drop Assist, Right SI joint, Prone      Modalities:  MSTM to affected musculature    Therapeutic procedures:  None      Prognosis: Good    Progress towards Goals: Patient has been improving with care.      Recommendations:    Instructions:ice 20 minutes every other hour as needed and stretch as instructed at visit    Follow-up:  Return to care as needed.

## 2017-08-30 NOTE — MR AVS SNAPSHOT
"              After Visit Summary   2017    Jaki Gómez    MRN: [id number]           Patient Information     Date Of Birth          1960        Visit Information        Provider Department      2017 1:30 PM Sharri Nicolas, [facility] [place] [facility]        Today's Diagnoses     Segmental dysfunction of cervical region        Segmental dysfunction of lumbar region        Brachial neuritis or radiculitis        Cervicalgia        Segmental dysfunction of thoracic region           Follow-ups after your visit        Who to contact     If you have questions or need follow up information about today's clinic visit or your schedule please contact [facility] directly at [phone].  Normal or non-critical lab and imaging results will be communicated to you by iFoodhart, letter or phone within 4 business days after the clinic has received the results. If you do not hear from us within 7 days, please contact the clinic through iFoodhart or phone. If you have a critical or abnormal lab result, we will notify you by phone as soon as possible.  Submit refill requests through [employer] or call your pharmacy and they will forward the refill request to us. Please allow 3 business days for your refill to be completed.          Additional Information About Your Visit        MyChart Information     [employer] lets you send messages to your doctor, view your test results, renew your prescriptions, schedule appointments and more. To sign up, go to [email or web address] . Click on \"Log in\" on the left side of the screen, which will take you to the Welcome page. Then click on \"Sign up Now\" on the right side of the page.     You will be asked to enter the access code listed below, as well as some personal information. Please follow the directions to create your username and password.     Your access code is: [email or web address]  Expires: 2017  1:27 PM     Your access code will  in 90 " days. If you need help or a new code, please call your [facility] clinic or [phone].        Care EveryWhere ID     This is your Care EveryWhere ID. This could be used by other organizations to access your [facility] medical records  [id number]         Blood Pressure from Last 3 Encounters:   03/08/17 130/80   09/02/16 133/83   02/19/16 110/70    Weight from Last 3 Encounters:   03/30/17 65.3 kg (144 lb)   03/08/17 64 kg (141 lb)   09/02/16 59 kg (130 lb)              We Performed the Following     CHIROPRAC MANIP,SPINAL,3-4 REGIONS        Primary Care Provider Office Phone # Fax #    Narendra Peters [phone]       [facility] [address] [place] [address]        Equal Access to Services     [facility]: Hadii aad ku hadasho Soomaali, waaxda luqadaha, qaybta kaalmada adeegyada, dann aguayo hayaatoy hammond . So [facility] [phone].    ATENCIÓN: Si habla español, tiene a ferrari disposición servicios gratuitos de asistencia lingüística. Myron al [phone].    We comply with applicable federal civil rights laws and [place] laws. We do not discriminate on the basis of race, color, national origin, age, disability sex, sexual orientation or gender identity.            Thank you!     Thank you for choosing [facility]  for your care. Our goal is always to provide you with excellent care. Hearing back from our patients is one way we can continue to improve our services. Please take a few minutes to complete the written survey that you may receive in the mail after your visit with us. Thank you!             Your Updated Medication List - Protect others around you: Learn how to safely use, store and throw away your medicines at www.disposemymeds.org.          This list is accurate as of: 8/30/17  1:36 PM.  Always use your most recent med list.                   Brand Name Dispense Instructions for use Diagnosis    cyclobenzaprine 10 MG  tablet    FLEXERIL    90 tablet    TAKE ONE TABLET BY MOUTH THREE TIMES A DAY AS NEEDED FOR MUSCLE SPASMS    Cervicalgia       gabapentin 300 MG capsule    NEURONTIN    90 capsule    TAKE ONE CAPSULE BY MOUTH THREE TIMES A DAY    Multiple sclerosis (H)       levothyroxine 125 MCG tablet    SYNTHROID/LEVOTHROID    30 tablet    TAKE ONE TABLET BY MOUTH EVERY DAY    Hypothyroidism, unspecified type       MULTIVITAMIN ADULT PO      Take 1 tablet by mouth daily        sertraline 100 MG tablet    ZOLOFT    30 tablet    TAKE ONE TABLET BY MOUTH EVERY DAY (FOLLOW-UP APPOINTMENT NEEDED FOR FURTHER REFILLS)    Major depressive disorder, recurrent episode, mild (H)       traMADol 50 MG tablet    ULTRAM    120 tablet    Take 1 tablet (50 mg) by mouth every 6 hours as needed    Cervicalgia       TYLENOL 325 MG tablet   Generic drug:  acetaminophen     100 tablet    Take 2 tablets (650 mg) by mouth every 4 hours as needed    Gastrointestinal hemorrhage associated with duodenal ulcer

## 2017-09-18 ENCOUNTER — THERAPY VISIT (OUTPATIENT)
Dept: CHIROPRACTIC MEDICINE | Facility: CLINIC | Age: 57
End: 2017-09-18
Payer: COMMERCIAL

## 2017-09-18 DIAGNOSIS — M54.2 CERVICALGIA: ICD-10-CM

## 2017-09-18 DIAGNOSIS — M99.03 SEGMENTAL DYSFUNCTION OF LUMBAR REGION: ICD-10-CM

## 2017-09-18 DIAGNOSIS — M99.02 SEGMENTAL DYSFUNCTION OF THORACIC REGION: ICD-10-CM

## 2017-09-18 DIAGNOSIS — M99.01 SEGMENTAL DYSFUNCTION OF CERVICAL REGION: ICD-10-CM

## 2017-09-18 DIAGNOSIS — M54.12 BRACHIAL NEURITIS OR RADICULITIS: ICD-10-CM

## 2017-09-18 PROCEDURE — 98941 CHIROPRACT MANJ 3-4 REGIONS: CPT | Mod: AT | Performed by: CHIROPRACTOR

## 2017-09-18 RX ORDER — TRAMADOL HYDROCHLORIDE 50 MG/1
50 TABLET ORAL EVERY 6 HOURS PRN
Qty: 120 TABLET | Refills: 0 | Status: SHIPPED | OUTPATIENT
Start: 2017-09-18 | End: 2017-10-14

## 2017-09-18 NOTE — PROGRESS NOTES
"Visit #:  5 of 12    Subjective:  Jaki Gómez is a 55 year old female who is seen in f/u up for:        Segmental dysfunction of cervical region  Segmental dysfunction of lumbar region  Brachial neuritis or radiculitis  Cervicalgia  Segmental dysfunction of thoracic region.     Since last visit on 8/30/2017,  Jaki Gómez reports: that she her lower back hurts really bad today. The pain is mostly on the right side, and is shooting down her leg. She \"feels like she has an intertube around her stomach.\" She also states that her left arm has been numb. She states that she has been putting off surgery with Dr. Marie, and states she believes it is time to have surgery. Patient states that she oftentimes has itching and tingling in her arms and legs. She continues to take her muscle relaxers prescribed by Dr. Peters. She rates her current pain 8/10. She can only sleep by taking Sudafed.      ADL's : ADLs have been difficult with exacerbation of pain.          Objective:  The following was observed:    P: pain elicited on palpation, right lower back  A: static palpation demonstrates intersegmental asymmetry, as noted  R: motion palpation notes restricted motion  T: hypertonicity at:  Lumbar Paraspinals R>>L    Segmental spinal dysfunction/restrictions found at:  C2 RR, LRR  C5 LR, RRR  T1 RR, LRR  T7 E, FR  L5 LR, RRR  Right SI joint posterior      Assessment:    Diagnoses:      1. Segmental dysfunction of cervical region    2. Segmental dysfunction of lumbar region    3. Brachial neuritis or radiculitis    4. Cervicalgia    5. Segmental dysfunction of thoracic region        Patient's condition:  Patient had restrictions pre-manipulation and Patient had decreased motion prior to manipulation    Treatment effectiveness:  Post manipulation there is better intersegmental movement and Patient claims to feel looser post manipulation      Procedures:  CMT:  48074 Chiropractic manipulative treatment 3-4 regions performed "   Cervical: Diversified, C2, C5, Supine  Thoracic: Diversified, T1, T7, Prone   Lumbar: Drop assist, Flexion Distraction, L5, Prone  Pelvis: Drop Assist, Right SI joint, Prone      Modalities:  MSTM to affected musculature    Therapeutic procedures:  None      Prognosis: Good    Progress towards Goals: Patient has been improving with care but had a recent flare-up of lower back pain.      Recommendations:    Instructions:ice 20 minutes every other hour as needed and stretch as instructed at visit    Follow-up:  Return to care as needed. Recommended patient follow-up with Dr. Peters regarding itching and tingling.

## 2017-09-18 NOTE — MR AVS SNAPSHOT
"              After Visit Summary   2017    Jaki Gómez    MRN: [id number]           Patient Information     Date Of Birth          1960        Visit Information        Provider Department      2017 2:45 PM Sharri Nicolas DC [place] [facility]        Today's Diagnoses     Segmental dysfunction of cervical region        Segmental dysfunction of lumbar region        Brachial neuritis or radiculitis        Cervicalgia        Segmental dysfunction of thoracic region           Follow-ups after your visit        Who to contact     If you have questions or need follow up information about today's clinic visit or your schedule please contact [facility] directly at [phone].  Normal or non-critical lab and imaging results will be communicated to you by [employer] Network[employer], letter or phone within 4 business days after the clinic has received the results. If you do not hear from us within 7 days, please contact the clinic through [employer] or phone. If you have a critical or abnormal lab result, we will notify you by phone as soon as possible.  Submit refill requests through [employer] or call your pharmacy and they will forward the refill request to us. Please allow 3 business days for your refill to be completed.          Additional Information About Your Visit        MyChart Information     [employer] lets you send messages to your doctor, view your test results, renew your prescriptions, schedule appointments and more. To sign up, go to [email or web address] . Click on \"Log in\" on the left side of the screen, which will take you to the Welcome page. Then click on \"Sign up Now\" on the right side of the page.     You will be asked to enter the access code listed below, as well as some personal information. Please follow the directions to create your username and password.     Your access code is: [email or web address]  Expires: 2017  1:27 PM     Your access code will  in 90 " days. If you need help or a new code, please call your [facility] clinic or [phone].        Care EveryWhere ID     This is your Care EveryWhere ID. This could be used by other organizations to access your [facility] medical records  [id number]         Blood Pressure from Last 3 Encounters:   03/08/17 130/80   09/02/16 133/83   02/19/16 110/70    Weight from Last 3 Encounters:   03/30/17 65.3 kg (144 lb)   03/08/17 64 kg (141 lb)   09/02/16 59 kg (130 lb)              We Performed the Following     CHIROPRAC MANIP,SPINAL,3-4 REGIONS          Where to get your medicines      Some of these will need a paper prescription and others can be bought over the counter.  Ask your nurse if you have questions.     Bring a paper prescription for each of these medications     traMADol 50 MG tablet          Primary Care Provider Office Phone # Fax #    Narendra Peters [phone]       [facility] [address] [place] [address]        Equal Access to Services     [facility] : Hadii aad ku hadasho Soomaali, waaxda luqadaha, qaybta kaalmada adeegyada, waxay dede hayjulisa hammond . So [facility] [phone].    ATENCIÓN: Si habla español, tiene a ferrari disposición servicios gratuitos de asistencia lingüística. Llame al [phone].    We comply with applicable federal civil rights laws and [place] laws. We do not discriminate on the basis of race, color, national origin, age, disability sex, sexual orientation or gender identity.            Thank you!     Thank you for choosing [facility]  for your care. Our goal is always to provide you with excellent care. Hearing back from our patients is one way we can continue to improve our services. Please take a few minutes to complete the written survey that you may receive in the mail after your visit with us. Thank you!             Your Updated Medication List - Protect others around you: Learn how to safely  use, store and throw away your medicines at www.disposemymeds.org.          This list is accurate as of: 9/18/17  2:59 PM.  Always use your most recent med list.                   Brand Name Dispense Instructions for use Diagnosis    cyclobenzaprine 10 MG tablet    FLEXERIL    90 tablet    TAKE ONE TABLET BY MOUTH THREE TIMES A DAY AS NEEDED FOR MUSCLE SPASMS    Cervicalgia       gabapentin 300 MG capsule    NEURONTIN    90 capsule    TAKE ONE CAPSULE BY MOUTH THREE TIMES A DAY    Multiple sclerosis (H)       levothyroxine 125 MCG tablet    SYNTHROID/LEVOTHROID    30 tablet    TAKE ONE TABLET BY MOUTH EVERY DAY    Hypothyroidism, unspecified type       MULTIVITAMIN ADULT PO      Take 1 tablet by mouth daily        sertraline 100 MG tablet    ZOLOFT    30 tablet    TAKE ONE TABLET BY MOUTH EVERY DAY (FOLLOW-UP APPOINTMENT NEEDED FOR FURTHER REFILLS)    Major depressive disorder, recurrent episode, mild (H)       traMADol 50 MG tablet    ULTRAM    120 tablet    Take 1 tablet (50 mg) by mouth every 6 hours as needed    Cervicalgia       TYLENOL 325 MG tablet   Generic drug:  acetaminophen     100 tablet    Take 2 tablets (650 mg) by mouth every 4 hours as needed    Gastrointestinal hemorrhage associated with duodenal ulcer

## 2017-09-29 ENCOUNTER — TELEPHONE (OUTPATIENT)
Dept: FAMILY MEDICINE | Facility: CLINIC | Age: 57
End: 2017-09-29

## 2017-09-29 NOTE — TELEPHONE ENCOUNTER
Patient is due for a PHQ-9.  Index start date:8/08/2017  Index end date:10/08/2017    Please call patient. Pt is due for an office visit for a recheck on Zoloft. Please schedule appt and update PHQ-9 over the phone.

## 2017-09-29 NOTE — TELEPHONE ENCOUNTER
I have attempted to call the pt to update PHQ-9 and schedule an office visit for a follow-up. I left message for pt to call back. I will call back another time. Rachell Pérez CMA ([facility])

## 2017-10-03 NOTE — TELEPHONE ENCOUNTER
I have attempted to call the pt to update PHQ-9 and schedule an office visit for a follow-up. I left message for pt to call back. I will call back another time. Rachell Pérez CMA ([facility])

## 2017-10-04 ASSESSMENT — PATIENT HEALTH QUESTIONNAIRE - PHQ9: SUM OF ALL RESPONSES TO PHQ QUESTIONS 1-9: 14

## 2017-10-06 ENCOUNTER — THERAPY VISIT (OUTPATIENT)
Dept: CHIROPRACTIC MEDICINE | Facility: CLINIC | Age: 57
End: 2017-10-06
Payer: COMMERCIAL

## 2017-10-06 DIAGNOSIS — M54.12 BRACHIAL NEURITIS OR RADICULITIS: ICD-10-CM

## 2017-10-06 DIAGNOSIS — M54.2 CERVICALGIA: ICD-10-CM

## 2017-10-06 DIAGNOSIS — M99.01 SEGMENTAL DYSFUNCTION OF CERVICAL REGION: ICD-10-CM

## 2017-10-06 DIAGNOSIS — M99.02 SEGMENTAL DYSFUNCTION OF THORACIC REGION: ICD-10-CM

## 2017-10-06 DIAGNOSIS — M99.04 SEGMENTAL DYSFUNCTION OF SACRAL REGION: Primary | ICD-10-CM

## 2017-10-06 DIAGNOSIS — M99.03 SEGMENTAL DYSFUNCTION OF LUMBAR REGION: ICD-10-CM

## 2017-10-06 PROCEDURE — 98941 CHIROPRACT MANJ 3-4 REGIONS: CPT | Mod: AT | Performed by: CHIROPRACTOR

## 2017-10-06 NOTE — MR AVS SNAPSHOT
After Visit Summary   10/6/2017    Jaki Gómez    MRN: [id number]           Patient Information     Date Of Birth          1960        Visit Information        Provider Department      10/6/2017 1:15 PM Sharri Nicolas DC [place] [facility]        Today's Diagnoses     Segmental dysfunction of sacral region    -  1    Segmental dysfunction of cervical region        Segmental dysfunction of lumbar region        Brachial neuritis or radiculitis        Cervicalgia        Segmental dysfunction of thoracic region           Follow-ups after your visit        Your next 10 appointments already scheduled     Oct 13, 2017  2:00 PM CDT   [facility]rendra [facility]   [facility]    [address] [place] [id number] [phone]           Bring a current list of meds and any records pertaining to this visit. For Physicals, please bring immunization records and any forms needing to be filled out. Please arrive 10 minutes early to complete paperwork.              Who to contact     If you have questions or need follow up information about today's clinic visit or your schedule please contact [facility] directly at [phone].  Normal or non-critical lab and imaging results will be communicated to you by L[employer].comhart, letter or phone within 4 business days after the clinic has received the results. If you do not hear from us within 7 days, please contact the clinic through [employer] or phone. If you have a critical or abnormal lab result, we will notify you by phone as soon as possible.  Submit refill requests through [employer] or call your pharmacy and they will forward the refill request to us. Please allow 3 business days for your refill to be completed.          Additional Information About Your Visit        [email or web address] Information     [employer] lets you send messages to your doctor, view your test results,  "renew your prescriptions, schedule appointments and more. To sign up, go to [email or web address] . Click on \"Log in\" on the left side of the screen, which will take you to the Welcome page. Then click on \"Sign up Now\" on the right side of the page.     You will be asked to enter the access code listed below, as well as some personal information. Please follow the directions to create your username and password.     Your access code is: [email or web address]  Expires: 2017  1:27 PM     Your access code will  in 90 days. If you need help or a new code, please call your [place] clinic or [phone].        Care EveryWhere ID     This is your Care EveryWhere ID. This could be used by other organizations to access your [place] medical records  [id number]         Blood Pressure from Last 3 Encounters:   17 130/80   16 133/83   16 110/70    Weight from Last 3 Encounters:   17 65.3 kg (144 lb)   17 64 kg (141 lb)   16 59 kg (130 lb)              We Performed the Following     CHIROPRAC MANIP,SPINAL,3-4 REGIONS        Primary Care Provider Office Phone # Fax #    Narendra Peters [phone]       [facility] [address] [place] [address]        Equal Access to Services     [facility]: Hadii aad ku hadasho Soomaali, waaxda luqadaha, qaybta kaalmada adeegyada, dann hammond . So [facility] [phone].    ATENCIÓN: Si habla español, tiene a ferrari disposición servicios gratuitos de asistencia lingüística. Myron banegas [phone].    We comply with applicable federal civil rights laws and [place] laws. We do not discriminate on the basis of race, color, national origin, age, disability, sex, sexual orientation, or gender identity.            Thank you!     Thank you for choosing [place] [facility]  for your care. Our goal is always to provide you with excellent care. Hearing back from our patients " is one way we can continue to improve our services. Please take a few minutes to complete the written survey that you may receive in the mail after your visit with us. Thank you!             Your Updated Medication List - Protect others around you: Learn how to safely use, store and throw away your medicines at www.disposemymeds.org.          This list is accurate as of: 10/6/17  1:24 PM.  Always use your most recent med list.                   Brand Name Dispense Instructions for use Diagnosis    cyclobenzaprine 10 MG tablet    FLEXERIL    90 tablet    TAKE ONE TABLET BY MOUTH THREE TIMES A DAY AS NEEDED FOR MUSCLE SPASMS    Cervicalgia       gabapentin 300 MG capsule    NEURONTIN    90 capsule    TAKE ONE CAPSULE BY MOUTH THREE TIMES A DAY    Multiple sclerosis (H)       levothyroxine 125 MCG tablet    SYNTHROID/LEVOTHROID    30 tablet    TAKE ONE TABLET BY MOUTH EVERY DAY    Hypothyroidism, unspecified type       MULTIVITAMIN ADULT PO      Take 1 tablet by mouth daily        sertraline 100 MG tablet    ZOLOFT    30 tablet    TAKE ONE TABLET BY MOUTH EVERY DAY (FOLLOW-UP APPOINTMENT NEEDED FOR FURTHER REFILLS)    Major depressive disorder, recurrent episode, mild (H)       traMADol 50 MG tablet    ULTRAM    120 tablet    Take 1 tablet (50 mg) by mouth every 6 hours as needed    Cervicalgia       TYLENOL 325 MG tablet   Generic drug:  acetaminophen     100 tablet    Take 2 tablets (650 mg) by mouth every 4 hours as needed    Gastrointestinal hemorrhage associated with duodenal ulcer

## 2017-10-06 NOTE — PROGRESS NOTES
Visit #:  6 of 12    Subjective:  Jaki Gómez is a 55 year old female who is seen in f/u up for:        Segmental dysfunction of cervical region  Segmental dysfunction of lumbar region  Brachial neuritis or radiculitis  Cervicalgia  Segmental dysfunction of thoracic region.     Since last visit on 9/18/2017,  Jaki Gómez reports: that she is having pain in her lower back. She has pain on her left side that won't let her sleep, and the pain on her right won't let her stand. She points to pain in her left lower ribcage region. She has tried creams, the ball, etc, and will not relent. Her pain is rated 7/10.    ADL's : ADLs have been difficult with exacerbation of pain.          Objective:  The following was observed:    P: pain elicited on palpation, leftt lower back, TL region  A: static palpation demonstrates intersegmental asymmetry, as noted  R: motion palpation notes restricted motion  T: hypertonicity at:  Lumbar Paraspinals L>>R    Segmental spinal dysfunction/restrictions found at:  C2 LR, RRR  C5 RR, LRR  T1 RR, LRR  T6 E, FR   T10 E, FR  L5 LR, RRR  Right SI joint posterior      Assessment:    Diagnoses:      1. Segmental dysfunction of cervical region    2. Segmental dysfunction of lumbar region    3. Brachial neuritis or radiculitis    4. Cervicalgia    5. Segmental dysfunction of thoracic region        Patient's condition:  Patient had restrictions pre-manipulation and Patient had decreased motion prior to manipulation    Treatment effectiveness:  Post manipulation there is better intersegmental movement and Patient claims to feel looser post manipulation      Procedures:  CMT:  36687 Chiropractic manipulative treatment 3-4 regions performed   Cervical: Diversified, C2, C5, Supine  Thoracic: Diversified, T1, T6, T10, Prone   Lumbar: Drop assist, Flexion Distraction, L5, Prone  Pelvis: Drop Assist, Right SI joint, Prone      Modalities:  MSTM to affected musculature    Therapeutic  procedures:  None      Prognosis: Good    Progress towards Goals: Patient has been improving with care but had a recent flare-up of lower back pain.      Recommendations:    Instructions:ice 20 minutes every other hour as needed and stretch as instructed at visit    Follow-up:  Return to care as needed.

## 2017-10-13 ENCOUNTER — OFFICE VISIT (OUTPATIENT)
Dept: FAMILY MEDICINE | Facility: CLINIC | Age: 57
End: 2017-10-13
Payer: COMMERCIAL

## 2017-10-13 VITALS
BODY MASS INDEX: 25.11 KG/M2 | DIASTOLIC BLOOD PRESSURE: 80 MMHG | OXYGEN SATURATION: 99 % | HEIGHT: 61 IN | SYSTOLIC BLOOD PRESSURE: 126 MMHG | WEIGHT: 133 LBS | HEART RATE: 117 BPM | TEMPERATURE: 97.6 F

## 2017-10-13 DIAGNOSIS — E03.9 HYPOTHYROIDISM, UNSPECIFIED TYPE: ICD-10-CM

## 2017-10-13 DIAGNOSIS — M54.2 CHRONIC NECK PAIN: ICD-10-CM

## 2017-10-13 DIAGNOSIS — F33.0 MAJOR DEPRESSIVE DISORDER, RECURRENT EPISODE, MILD (H): Primary | ICD-10-CM

## 2017-10-13 DIAGNOSIS — G89.29 CHRONIC NECK PAIN: ICD-10-CM

## 2017-10-13 DIAGNOSIS — R42 DIZZINESS: ICD-10-CM

## 2017-10-13 LAB
ALBUMIN SERPL-MCNC: 3.9 G/DL (ref 3.4–5)
ALP SERPL-CCNC: 111 U/L (ref 40–150)
ALT SERPL W P-5'-P-CCNC: 31 U/L (ref 0–50)
ANION GAP SERPL CALCULATED.3IONS-SCNC: 13 MMOL/L (ref 3–14)
AST SERPL W P-5'-P-CCNC: 31 U/L (ref 0–45)
BILIRUB SERPL-MCNC: 0.3 MG/DL (ref 0.2–1.3)
BUN SERPL-MCNC: 4 MG/DL (ref 7–30)
CALCIUM SERPL-MCNC: 9.1 MG/DL (ref 8.5–10.1)
CHLORIDE SERPL-SCNC: 99 MMOL/L (ref 94–109)
CO2 SERPL-SCNC: 27 MMOL/L (ref 20–32)
CREAT SERPL-MCNC: 0.63 MG/DL (ref 0.52–1.04)
ERYTHROCYTE [DISTWIDTH] IN BLOOD BY AUTOMATED COUNT: 13.3 % (ref 10–15)
GFR SERPL CREATININE-BSD FRML MDRD: >90 ML/MIN/1.7M2
GLUCOSE SERPL-MCNC: 96 MG/DL (ref 70–99)
HCT VFR BLD AUTO: 44.7 % (ref 35–47)
HGB BLD-MCNC: 14.8 G/DL (ref 11.7–15.7)
MCH RBC QN AUTO: 32.8 PG (ref 26.5–33)
MCHC RBC AUTO-ENTMCNC: 33.1 G/DL (ref 31.5–36.5)
MCV RBC AUTO: 99 FL (ref 78–100)
PLATELET # BLD AUTO: 358 10E9/L (ref 150–450)
POTASSIUM SERPL-SCNC: 4.4 MMOL/L (ref 3.4–5.3)
PROT SERPL-MCNC: 8.1 G/DL (ref 6.8–8.8)
RBC # BLD AUTO: 4.51 10E12/L (ref 3.8–5.2)
SODIUM SERPL-SCNC: 139 MMOL/L (ref 133–144)
T4 FREE SERPL-MCNC: 0.72 NG/DL (ref 0.76–1.46)
TSH SERPL DL<=0.005 MIU/L-ACNC: 7 MU/L (ref 0.4–4)
WBC # BLD AUTO: 10.2 10E9/L (ref 4–11)

## 2017-10-13 PROCEDURE — 84443 ASSAY THYROID STIM HORMONE: CPT | Performed by: FAMILY MEDICINE

## 2017-10-13 PROCEDURE — 84439 ASSAY OF FREE THYROXINE: CPT | Performed by: FAMILY MEDICINE

## 2017-10-13 PROCEDURE — 99214 OFFICE O/P EST MOD 30 MIN: CPT | Performed by: FAMILY MEDICINE

## 2017-10-13 PROCEDURE — 80053 COMPREHEN METABOLIC PANEL: CPT | Performed by: FAMILY MEDICINE

## 2017-10-13 PROCEDURE — 85027 COMPLETE CBC AUTOMATED: CPT | Performed by: FAMILY MEDICINE

## 2017-10-13 PROCEDURE — 36415 COLL VENOUS BLD VENIPUNCTURE: CPT | Performed by: FAMILY MEDICINE

## 2017-10-13 ASSESSMENT — ANXIETY QUESTIONNAIRES
IF YOU CHECKED OFF ANY PROBLEMS ON THIS QUESTIONNAIRE, HOW DIFFICULT HAVE THESE PROBLEMS MADE IT FOR YOU TO DO YOUR WORK, TAKE CARE OF THINGS AT HOME, OR GET ALONG WITH OTHER PEOPLE: EXTREMELY DIFFICULT
1. FEELING NERVOUS, ANXIOUS, OR ON EDGE: NEARLY EVERY DAY
2. NOT BEING ABLE TO STOP OR CONTROL WORRYING: SEVERAL DAYS
GAD7 TOTAL SCORE: 13
3. WORRYING TOO MUCH ABOUT DIFFERENT THINGS: NOT AT ALL
5. BEING SO RESTLESS THAT IT IS HARD TO SIT STILL: NEARLY EVERY DAY
7. FEELING AFRAID AS IF SOMETHING AWFUL MIGHT HAPPEN: NOT AT ALL
6. BECOMING EASILY ANNOYED OR IRRITABLE: NEARLY EVERY DAY

## 2017-10-13 ASSESSMENT — PATIENT HEALTH QUESTIONNAIRE - PHQ9
5. POOR APPETITE OR OVEREATING: NEARLY EVERY DAY
SUM OF ALL RESPONSES TO PHQ QUESTIONS 1-9: 18

## 2017-10-13 NOTE — MR AVS SNAPSHOT
"              After Visit Summary   10/13/2017    Jaki Gómez    MRN: [id number]           Patient Information     Date Of Birth          1960        Visit Information        Provider Department      10/13/2017 2:00 PM Narendra Peters MD [facility]        Today's Diagnoses     Major depressive disorder, recurrent episode, mild (H)    -  1    Hypothyroidism, unspecified type        Dizziness        Chronic neck pain           Follow-ups after your visit        Your next 10 appointments already scheduled     Oct 26, 2017  1:00 PM CDT   Return Visit with Apolinar Marie MD   [facility] ([facility])    [address] [place] [id number] [phone]              Who to contact     If you have questions or need follow up information about today's clinic visit or your schedule please contact [facility] directly at [phone].  Normal or non-critical lab and imaging results will be communicated to you by MyChart, letter or phone within 4 business days after the clinic has received the results. If you do not hear from us within 7 days, please contact the clinic through "[employer]hart or phone. If you have a critical or abnormal lab result, we will notify you by phone as soon as possible.  Submit refill requests through [employer] or call your pharmacy and they will forward the refill request to us. Please allow 3 business days for your refill to be completed.          Additional Information About Your Visit        MyChart Information     [employer] lets you send messages to your doctor, view your test results, renew your prescriptions, schedule appointments and more. To sign up, go to [email or web address] . Click on \"Log in\" on the left side of the screen, which will take you to the Welcome page. Then click on \"Sign up Now\" on the right side of the page.     You will be asked to enter the access code listed below, as well as some personal " "information. Please follow the directions to create your username and password.     Your access code is: [email or web address]  Expires: 2017  1:27 PM     Your access code will  in 90 days. If you need help or a new code, please call your [place] clinic or [phone].        Care EveryWhere ID     This is your Care EveryWhere ID. This could be used by other organizations to access your [place] medical records  [id number]        Your Vitals Were     Pulse Temperature Height Pulse Oximetry BMI (Body Mass Index)       117 97.6  F (36.4  C) (Tympanic) 5' 1\" (1.549 m) 99% 25.13 kg/m2        Blood Pressure from Last 3 Encounters:   10/13/17 126/80   17 130/80   16 133/83    Weight from Last 3 Encounters:   10/13/17 133 lb (60.3 kg)   17 144 lb (65.3 kg)   17 141 lb (64 kg)              We Performed the Following     CBC with platelets     Comprehensive metabolic panel (BMP + Alb, Alk Phos, ALT, AST, Total. Bili, TP)     TSH with free T4 reflex          Today's Medication Changes          These changes are accurate as of: 10/13/17  2:29 PM.  If you have any questions, ask your nurse or doctor.               These medicines have changed or have updated prescriptions.        Dose/Directions    sertraline 50 MG tablet   Commonly known as:  ZOLOFT   This may have changed:  See the new instructions.   Used for:  Major depressive disorder, recurrent episode, mild (H)   Changed by:  Narendra Peters MD        TAKE ONE TABLET BY MOUTH EVERY DAY   Quantity:  30 tablet   Refills:  1            Where to get your medicines      These medications were sent to [facility] [place]  [place] [address] [id number]     Phone:  [phone]     sertraline 50 MG tablet                Primary Care Provider Office Phone # Fax #    Narendra Peters [phone]       [place] [address] [address]        Equal " Access to Services     [facility]: Hadii aad ku hadjjregina Taliahussein, wastevenda luqadaha, qaybta kavestadann kent. So [facility] [phone].    ATENCIÓN: Si habla erick, tiene a ferrari disposición servicios gratuitos de asistencia lingüística. Llame al [phone].    We comply with applicable federal civil rights laws and [place] laws. We do not discriminate on the basis of race, color, national origin, age, disability, sex, sexual orientation, or gender identity.            Thank you!     Thank you for choosing [facility]  for your care. Our goal is always to provide you with excellent care. Hearing back from our patients is one way we can continue to improve our services. Please take a few minutes to complete the written survey that you may receive in the mail after your visit with us. Thank you!             Your Updated Medication List - Protect others around you: Learn how to safely use, store and throw away your medicines at www.disposemymeds.org.          This list is accurate as of: 10/13/17  2:29 PM.  Always use your most recent med list.                   Brand Name Dispense Instructions for use Diagnosis    cyclobenzaprine 10 MG tablet    FLEXERIL    90 tablet    TAKE ONE TABLET BY MOUTH THREE TIMES A DAY AS NEEDED FOR MUSCLE SPASMS    Cervicalgia       gabapentin 300 MG capsule    NEURONTIN    90 capsule    TAKE ONE CAPSULE BY MOUTH THREE TIMES A DAY    Multiple sclerosis (H)       levothyroxine 125 MCG tablet    SYNTHROID/LEVOTHROID    30 tablet    TAKE ONE TABLET BY MOUTH EVERY DAY    Hypothyroidism, unspecified type       MULTIVITAMIN ADULT PO      Take 1 tablet by mouth daily        sertraline 50 MG tablet    ZOLOFT    30 tablet    TAKE ONE TABLET BY MOUTH EVERY DAY    Major depressive disorder, recurrent episode, mild (H)       traMADol 50 MG tablet    ULTRAM    120 tablet    Take 1 tablet (50 mg) by mouth every 6 hours as needed    Cervicalgia        TYLENOL 325 MG tablet   Generic drug:  acetaminophen     100 tablet    Take 2 tablets (650 mg) by mouth every 4 hours as needed    Gastrointestinal hemorrhage associated with duodenal ulcer

## 2017-10-13 NOTE — NURSING NOTE
"Chief Complaint   Patient presents with     RECHECK     Depression       Initial /80 (BP Location: Right arm, Patient Position: Chair, Cuff Size: Adult Regular)  Pulse 117  Temp 97.6  F (36.4  C) (Tympanic)  Ht 5' 1\" (1.549 m)  Wt 133 lb (60.3 kg)  SpO2 99%  BMI 25.13 kg/m2 Estimated body mass index is 25.13 kg/(m^2) as calculated from the following:    Height as of this encounter: 5' 1\" (1.549 m).    Weight as of this encounter: 133 lb (60.3 kg).  Medication Reconciliation: complete    "

## 2017-10-13 NOTE — PROGRESS NOTES
SUBJECTIVE:   Jaki Gómez is a 57 year old female who presents to clinic today for the following health issues:  Patient is in clinic to follow up on her Zoloft. Patient has not used it in about 3 months now and cannot really notice a difference being off of it.     Depression and Anxiety Follow-Up    Status since last visit: No change    Other associated symptoms:None    Complicating factors: Back pain, neck pain    Significant life event:     Current substance abuse: None    PHQ-9 Score and MyChart F/U Questions 1/18/2016 3/8/2017 10/4/2017   Total Score 9 12 14   Q9: Suicide Ideation Not at all Not at all Not at all     RACQUEL-7 SCORE 1/5/2015 1/18/2016 3/8/2017   Total Score 9 - -   Total Score - 9 5       PHQ-9  English  PHQ-9   Any Language  GAD7  Suicide Assessment Five-step Evaluation and Treatment (SAFE-T)      Amount of exercise or physical activity:     Problems taking medications regularly: No    Medication side effects: none  Diet:           Problem list and histories reviewed & adjusted, as indicated.  Additional history: as documented        Reviewed and updated as needed this visit by clinical staff     Reviewed and updated as needed this visit by Provider        SUBJECTIVE:  Jaki  is a 57 year old female who presents for:  Comes in for a number of reasons today. #1 is follow-up for depression and anxiety. She has quit taking her Zoloft many months ago. She thinks she is doing all right but she is not sure. She is having a lot more neck pain and tingling and numbness in her right arm. She is been going to chiropractic. She saw neurosurgery 7 months ago and they recommended surgery. She is thinking she is going to have to reconsider this now. She is also been fatigued and tired but she states she isn't sleeping well. Complains of dizziness and lightheadedness times when she is up and about She wants her thyroid checked and some other tests done but agrees it may be just the depression kicking him  again and the stress of her neck.    Past Medical History:   Diagnosis Date     Lumbago      Multiple sclerosis (H)      Unspecified hypothyroidism     Hypothyroidism     Past Surgical History:   Procedure Laterality Date     ESOPHAGOSCOPY, GASTROSCOPY, DUODENOSCOPY (EGD), COMBINED N/A 2016    Procedure: COMBINED ESOPHAGOSCOPY, GASTROSCOPY, DUODENOSCOPY (EGD);  Surgeon: Walter Rodriguez MD;  Location: [facility]     ESOPHAGOSCOPY, GASTROSCOPY, DUODENOSCOPY (EGD), COMBINED N/A 2/15/2016    Procedure: COMBINED ESOPHAGOSCOPY, GASTROSCOPY, DUODENOSCOPY (EGD);  Surgeon: Narendra Mendoza MD;  Location: [facility]     HC CLOSED TX TIBIAL SHAFT FX W/O MANIPULATION  2003    Closed reduction and long leg cast application of left tib-fib fx.     HC INJ EPIDURAL LUMBAR/SACRAL W/WO CONTRAST  2006    Steroid injection L4-5     HC TREATMENT MISSED  SURG, 1ST TRIMESTER      Suction dilatation and curettage     Social History   Substance Use Topics     Smoking status: Current Every Day Smoker     Packs/day: 0.50     Types: Cigarettes     Smokeless tobacco: Never Used     Alcohol use No     Current Outpatient Prescriptions   Medication Sig Dispense Refill     sertraline (ZOLOFT) 50 MG tablet TAKE ONE TABLET BY MOUTH EVERY DAY 30 tablet 1     traMADol (ULTRAM) 50 MG tablet Take 1 tablet (50 mg) by mouth every 6 hours as needed 120 tablet 0     levothyroxine (SYNTHROID/LEVOTHROID) 125 MCG tablet TAKE ONE TABLET BY MOUTH EVERY DAY 30 tablet 6     cyclobenzaprine (FLEXERIL) 10 MG tablet TAKE ONE TABLET BY MOUTH THREE TIMES A DAY AS NEEDED FOR MUSCLE SPASMS 90 tablet 3     gabapentin (NEURONTIN) 300 MG capsule TAKE ONE CAPSULE BY MOUTH THREE TIMES A DAY 90 capsule 11     acetaminophen (TYLENOL) 325 MG tablet Take 2 tablets (650 mg) by mouth every 4 hours as needed 100 tablet      Multiple Vitamins-Minerals (MULTIVITAMIN ADULT PO) Take 1 tablet by mouth daily       [DISCONTINUED] sertraline (ZOLOFT) 100 MG tablet TAKE  "ONE TABLET BY MOUTH EVERY DAY (FOLLOW-UP APPOINTMENT NEEDED FOR FURTHER REFILLS) 30 tablet 0       REVIEW OF SYSTEMS:   5 point ROS negative except as noted above in HPI, including Gen., Resp, CV, GI &  system review.     OBJECTIVE:  Vitals: /80 (BP Location: Right arm, Patient Position: Chair, Cuff Size: Adult Regular)  Pulse 117  Temp 97.6  F (36.4  C) (Tympanic)  Ht 5' 1\" (1.549 m)  Wt 133 lb (60.3 kg)  SpO2 99%  BMI 25.13 kg/m2  BMI= Body mass index is 25.13 kg/(m^2).  She appears in no distress. Her affect is appropriate neatly groomed and dressed. Eyes PERRLA. Neck is tender especially at the base on the right with movement. No thyromegaly. No adenopathy. Lungs are clear. Heart regular rhythm rate in the upper 90s. Skin with no rashes. Extremities normal.    ASSESSMENT:  #1 depression #2 hypothyroidism #3 dizziness #4 chronic neck pain    PLAN:  Start her back on Zoloft 50 mg a day can move up to 100 is where she was asked. We will check her thyroid today blood sugar hemoglobin and some other blood tests because of the dizziness and fatigue. Have her do a follow-up consult with neurosurgery regarding her neck.        Narendra Peters MD  [facility]              "

## 2017-10-14 DIAGNOSIS — M54.2 CERVICALGIA: ICD-10-CM

## 2017-10-14 DIAGNOSIS — F33.0 MAJOR DEPRESSIVE DISORDER, RECURRENT EPISODE, MILD (H): ICD-10-CM

## 2017-10-14 ASSESSMENT — ANXIETY QUESTIONNAIRES: GAD7 TOTAL SCORE: 13

## 2017-10-14 NOTE — TELEPHONE ENCOUNTER
tramadol      Last Written Prescription Date: 09/18/2017  Last Fill Quantity: 120,  # refills: 0   Last Office Visit with G, P or [facility] prescribing provider: 10/14/2017                                         Next 5 appointments (look out 90 days)     Oct 26, 2017  1:00 PM CDT   [facility] with [facility]ar [facility]   [facility]    [address] [place] [id number] [phone]                  Thank You,  Natalio Hightower, Pharmacy Tech  [facility]

## 2017-10-16 RX ORDER — TRAMADOL HYDROCHLORIDE 50 MG/1
50 TABLET ORAL EVERY 6 HOURS PRN
Qty: 120 TABLET | Refills: 0 | Status: SHIPPED | OUTPATIENT
Start: 2017-10-16 | End: 2017-11-13

## 2017-10-31 DIAGNOSIS — E03.9 HYPOTHYROIDISM, UNSPECIFIED TYPE: ICD-10-CM

## 2017-10-31 NOTE — TELEPHONE ENCOUNTER
----- Message from Narendra Peters MD sent at 10/31/2017  1:08 PM CDT -----  Your thyroid test was off. Indicating a need a higher dose of Synthroid. We need to send him Synthroid 137  g daily  call him 30 pills with one refill to check TSH in 6-8 weeks. Chemistry panel was normal

## 2017-10-31 NOTE — TELEPHONE ENCOUNTER
Lm for pt to call clinic back, please give msg below and ask what pharmacy she would like to use. Pended medication for Dr. Peters to sign once pharmacy is known. hCristina Roger CMA

## 2017-10-31 NOTE — PROGRESS NOTES
Your thyroid test was off. Indicating a need a higher dose of Synthroid. We need to send him Synthroid 137  g daily  call him 30 pills with one refill to check TSH in 6-8 weeks. Chemistry panel was normal

## 2017-11-02 RX ORDER — LEVOTHYROXINE SODIUM 137 UG/1
137 TABLET ORAL DAILY
Qty: 90 TABLET | Refills: 0 | Status: SHIPPED | OUTPATIENT
Start: 2017-11-02 | End: 2018-03-03

## 2017-11-02 NOTE — TELEPHONE ENCOUNTER
Pt called back and was informed of the result note. Please send rx to [facility]. Christina Roger CMA

## 2017-11-13 DIAGNOSIS — M54.2 CERVICALGIA: ICD-10-CM

## 2017-11-17 RX ORDER — TRAMADOL HYDROCHLORIDE 50 MG/1
50 TABLET ORAL EVERY 6 HOURS PRN
Qty: 120 TABLET | Refills: 0 | Status: SHIPPED | OUTPATIENT
Start: 2017-11-17 | End: 2017-12-12

## 2017-11-17 NOTE — TELEPHONE ENCOUNTER
Requested Prescriptions   Pending Prescriptions Disp Refills     traMADol (ULTRAM) 50 MG tablet 120 tablet 0     Sig: Take 1 tablet (50 mg) by mouth every 6 hours as needed    There is no refill protocol information for this order

## 2017-11-17 NOTE — TELEPHONE ENCOUNTER
Tramadol      Last Written Prescription Date:  10/16/17  Last Fill Quantity: 120,   # refills: 0  Future Office visit:       Routing refill request to provider for review/approval because:  Drug not on the [facility], P or [facility] refill protocol or controlled substance

## 2017-12-01 ENCOUNTER — THERAPY VISIT (OUTPATIENT)
Dept: CHIROPRACTIC MEDICINE | Facility: CLINIC | Age: 57
End: 2017-12-01
Payer: COMMERCIAL

## 2017-12-01 DIAGNOSIS — M54.12 BRACHIAL NEURITIS OR RADICULITIS: ICD-10-CM

## 2017-12-01 DIAGNOSIS — M99.03 SEGMENTAL DYSFUNCTION OF LUMBAR REGION: ICD-10-CM

## 2017-12-01 DIAGNOSIS — M99.02 SEGMENTAL DYSFUNCTION OF THORACIC REGION: ICD-10-CM

## 2017-12-01 DIAGNOSIS — M54.2 CERVICALGIA: ICD-10-CM

## 2017-12-01 DIAGNOSIS — M99.04 SEGMENTAL DYSFUNCTION OF SACRAL REGION: Primary | ICD-10-CM

## 2017-12-01 DIAGNOSIS — M99.01 SEGMENTAL DYSFUNCTION OF CERVICAL REGION: ICD-10-CM

## 2017-12-01 PROCEDURE — 98941 CHIROPRACT MANJ 3-4 REGIONS: CPT | Mod: AT | Performed by: CHIROPRACTOR

## 2017-12-01 NOTE — PROGRESS NOTES
Visit #:  7 of 12    Subjective:  Jaki Gómez is a 55 year old female who is seen in f/u up for:        Segmental dysfunction of cervical region  Segmental dysfunction of lumbar region  Brachial neuritis or radiculitis  Cervicalgia  Segmental dysfunction of thoracic region.     Since last visit on 10/6/2017,  Jaki Gómez reports: that she fell with her blind dog a couple months ago. She sprained her left ankle and hurt her lower right back because she landed there on her steps. She had some bruising, but it feels better. She just feels all out of whack. Her dog is not going to make it. She has been home bound since she fell, and has been sitting a lot. She rates her pain in her upper and lower back about 6/10, but she feels like she is tolerating more and more pain.       ADL's : ADLs have been difficult with exacerbation of pain.          Objective:  The following was observed:    P: pain elicited on palpation, leftt lower back, TL region  A: static palpation demonstrates intersegmental asymmetry, as noted  R: motion palpation notes restricted motion  T: hypertonicity at:  Lumbar Paraspinals L>>R    Segmental spinal dysfunction/restrictions found at:  C2 LR, RRR  C5 RR, LRR  T1 RR, LRR  T5 E, FR   T9 E, FR  L4 LR, RRR  Right SI joint posterior      Assessment:    Diagnoses:      1. Segmental dysfunction of cervical region    2. Segmental dysfunction of lumbar region    3. Brachial neuritis or radiculitis    4. Cervicalgia    5. Segmental dysfunction of thoracic region        Patient's condition:  Patient had restrictions pre-manipulation and Patient had decreased motion prior to manipulation    Treatment effectiveness:  Post manipulation there is better intersegmental movement and Patient claims to feel looser post manipulation      Procedures:  CMT:  91464 Chiropractic manipulative treatment 3-4 regions performed   Cervical: Diversified, C2, C5, Supine  Thoracic: Diversified, T1, T5, T9, Prone   Lumbar: Drop  assist, Flexion Distraction, L4, Prone  Pelvis: Drop Assist, Right SI joint, Prone      Modalities:  MSTM to affected musculature    Therapeutic procedures:  None      Prognosis: Good    Progress towards Goals: Patient has been improving with care but had a recent flare-up of lower back pain.      Recommendations:    Instructions:ice 20 minutes every other hour as needed and stretch as instructed at visit    Follow-up:  Return to care as needed.

## 2017-12-01 NOTE — MR AVS SNAPSHOT
"              After Visit Summary   12/1/2017    Jaki Gómez    MRN: [id number]           Patient Information     Date Of Birth          1960        Visit Information        Provider Department      12/1/2017 2:00 PM Sharri Nicolas DC [facility]        Today's Diagnoses     Segmental dysfunction of sacral region    -  1    Segmental dysfunction of cervical region        Segmental dysfunction of lumbar region        Brachial neuritis or radiculitis        Cervicalgia        Segmental dysfunction of thoracic region           Follow-ups after your visit        Who to contact     If you have questions or need follow up information about today's clinic visit or your schedule please contact [facility] directly at [phone].  Normal or non-critical lab and imaging results will be communicated to you by [employer], letter or phone within 4 business days after the clinic has received the results. If you do not hear from us within 7 days, please contact the clinic through [employer] or phone. If you have a critical or abnormal lab result, we will notify you by phone as soon as possible.  Submit refill requests through [employer] or call your pharmacy and they will forward the refill request to us. Please allow 3 business days for your refill to be completed.          Additional Information About Your Visit        MyChart Information     [employer] lets you send messages to your doctor, view your test results, renew your prescriptions, schedule appointments and more. To sign up, go to [email or web address] . Click on \"Log in\" on the left side of the screen, which will take you to the Welcome page. Then click on \"Sign up Now\" on the right side of the page.     You will be asked to enter the access code listed below, as well as some personal information. Please follow the directions to create your username and password.     Your access code is: [email or web address]  Expires: 3/1/2018  " 2:12 PM     Your access code will  in 90 days. If you need help or a new code, please call your [place] clinic or [phone].        Care EveryWhere ID     This is your Care EveryWhere ID. This could be used by other organizations to access your [place] medical records  [id number]         Blood Pressure from Last 3 Encounters:   10/13/17 126/80   17 130/80   16 133/83    Weight from Last 3 Encounters:   10/13/17 60.3 kg (133 lb)   17 65.3 kg (144 lb)   17 64 kg (141 lb)              We Performed the Following     CHIROPRAC MANIP,SPINAL,3-4 REGIONS        Primary Care Provider Office Phone # Fax #    Narendra Peters [phone]       [facility] [address] [place] [address]        Equal Access to Services     [facility] : Hadii aad ku hadasho Soomaali, waaxda luqadaha, qaybta kaalmada adeegyada, waxay idiin hayaan jerald hammond . So [facility] [phone].    ATENCIÓN: Si habla español, tiene a ferrari disposición servicios gratuitos de asistencia lingüística. Myron al [phone].    We comply with applicable federal civil rights laws and [place] laws. We do not discriminate on the basis of race, color, national origin, age, disability, sex, sexual orientation, or gender identity.            Thank you!     Thank you for choosing [facility]  for your care. Our goal is always to provide you with excellent care. Hearing back from our patients is one way we can continue to improve our services. Please take a few minutes to complete the written survey that you may receive in the mail after your visit with us. Thank you!             Your Updated Medication List - Protect others around you: Learn how to safely use, store and throw away your medicines at www.disposemymeds.org.          This list is accurate as of: 17  2:12 PM.  Always use your most recent med list.                   Brand Name Dispense  Instructions for use Diagnosis    cyclobenzaprine 10 MG tablet    FLEXERIL    90 tablet    TAKE ONE TABLET BY MOUTH THREE TIMES A DAY AS NEEDED FOR MUSCLE SPASMS    Cervicalgia       gabapentin 300 MG capsule    NEURONTIN    90 capsule    TAKE ONE CAPSULE BY MOUTH THREE TIMES A DAY    Multiple sclerosis (H)       * levothyroxine 125 MCG tablet    SYNTHROID/LEVOTHROID    30 tablet    TAKE ONE TABLET BY MOUTH EVERY DAY    Hypothyroidism, unspecified type       * levothyroxine 137 MCG tablet    SYNTHROID/LEVOTHROID    90 tablet    Take 1 tablet (137 mcg) by mouth daily    Hypothyroidism, unspecified type       MULTIVITAMIN ADULT PO      Take 1 tablet by mouth daily        sertraline 50 MG tablet    ZOLOFT    30 tablet    TAKE ONE TABLET BY MOUTH EVERY DAY    Major depressive disorder, recurrent episode, mild (H)       traMADol 50 MG tablet    ULTRAM    120 tablet    Take 1 tablet (50 mg) by mouth every 6 hours as needed    Cervicalgia       TYLENOL 325 MG tablet   Generic drug:  acetaminophen     100 tablet    Take 2 tablets (650 mg) by mouth every 4 hours as needed    Gastrointestinal hemorrhage associated with duodenal ulcer       * Notice:  This list has 2 medication(s) that are the same as other medications prescribed for you. Read the directions carefully, and ask your doctor or other care provider to review them with you.

## 2017-12-12 DIAGNOSIS — M54.2 CERVICALGIA: ICD-10-CM

## 2017-12-12 NOTE — TELEPHONE ENCOUNTER
cyclobenzaprine (FLEXERIL) 10 MG tablet      Last Written Prescription Date:  4-20-17  Last Fill Quantity: 90,   # refills: 3  Last Office Visit: 10-13-17  Future Office visit:       Routing refill request to provider for review/approval because:  Drug not on the [facility], P or [facility] refill protocol or controlled substance

## 2017-12-13 RX ORDER — CYCLOBENZAPRINE HCL 10 MG
TABLET ORAL
Qty: 90 TABLET | Refills: 3 | Status: SHIPPED | OUTPATIENT
Start: 2017-12-13 | End: 2019-01-07

## 2017-12-13 RX ORDER — TRAMADOL HYDROCHLORIDE 50 MG/1
50 TABLET ORAL EVERY 6 HOURS PRN
Qty: 120 TABLET | Refills: 0 | Status: SHIPPED | OUTPATIENT
Start: 2017-12-13 | End: 2018-01-09

## 2018-01-09 DIAGNOSIS — M54.2 CERVICALGIA: ICD-10-CM

## 2018-01-10 RX ORDER — TRAMADOL HYDROCHLORIDE 50 MG/1
50 TABLET ORAL EVERY 6 HOURS PRN
Qty: 120 TABLET | Refills: 0 | Status: SHIPPED | OUTPATIENT
Start: 2018-01-10 | End: 2018-02-07

## 2018-01-19 ENCOUNTER — THERAPY VISIT (OUTPATIENT)
Dept: CHIROPRACTIC MEDICINE | Facility: CLINIC | Age: 58
End: 2018-01-19
Payer: COMMERCIAL

## 2018-01-19 DIAGNOSIS — M54.12 BRACHIAL NEURITIS OR RADICULITIS: ICD-10-CM

## 2018-01-19 DIAGNOSIS — M99.03 SEGMENTAL DYSFUNCTION OF LUMBAR REGION: ICD-10-CM

## 2018-01-19 DIAGNOSIS — M99.01 SEGMENTAL DYSFUNCTION OF CERVICAL REGION: ICD-10-CM

## 2018-01-19 DIAGNOSIS — M99.02 SEGMENTAL DYSFUNCTION OF THORACIC REGION: ICD-10-CM

## 2018-01-19 DIAGNOSIS — M99.04 SEGMENTAL DYSFUNCTION OF SACRAL REGION: Primary | ICD-10-CM

## 2018-01-19 DIAGNOSIS — M54.2 CERVICALGIA: ICD-10-CM

## 2018-01-19 PROCEDURE — 98941 CHIROPRACT MANJ 3-4 REGIONS: CPT | Mod: AT | Performed by: CHIROPRACTOR

## 2018-01-19 NOTE — PROGRESS NOTES
Visit #:      Subjective:  Jaki Gómez is a 55 year old female who is seen in f/u up for:        Segmental dysfunction of cervical region  Segmental dysfunction of lumbar region  Brachial neuritis or radiculitis  Cervicalgia  Segmental dysfunction of thoracic region.     Since last visit on 2017,  Jaki Gómez reports: that she hasn't been here in awhile, because her dog  recently and she hasn't been out. Her lower back and upper back are really bothering her, rated 6/10. Overall she is doing well.     ADL's : ADLs have been difficult with exacerbation of pain.          Objective:  The following was observed:    P: pain elicited on palpation, left lower back, TL region  A: static palpation demonstrates intersegmental asymmetry, as noted  R: motion palpation notes restricted motion  T: hypertonicity at:  Lumbar Paraspinals L>>R    Segmental spinal dysfunction/restrictions found at:  C2 RR, LRR  C5 LR, RRR  T1 RR, LRR  T5 E, FR   T10 E, FR  L4 LR, RRR  Right SI joint posterior      Assessment:    Diagnoses:      1. Segmental dysfunction of cervical region    2. Segmental dysfunction of lumbar region    3. Brachial neuritis or radiculitis    4. Cervicalgia    5. Segmental dysfunction of thoracic region        Patient's condition:  Patient had restrictions pre-manipulation and Patient had decreased motion prior to manipulation    Treatment effectiveness:  Post manipulation there is better intersegmental movement and Patient claims to feel looser post manipulation      Procedures:  CMT:  15513 Chiropractic manipulative treatment 3-4 regions performed   Cervical: Diversified, C2, C5, Supine  Thoracic: Diversified, T1, T5, T10, Prone   Lumbar: Drop assist, Flexion Distraction, L4, Prone  Pelvis: Drop Assist, Right SI joint, Prone      Modalities:  MSTM to affected musculature    Therapeutic procedures:  None      Prognosis: Good    Progress towards Goals: Patient has been improving with care but had a  recent flare-up of lower back pain.      Recommendations:    Instructions:ice 20 minutes every other hour as needed and stretch as instructed at visit    Follow-up:  Return to care as needed.

## 2018-01-19 NOTE — MR AVS SNAPSHOT
"              After Visit Summary   1/19/2018    Jaki Gómez    MRN: [id number]           Patient Information     Date Of Birth          1960        Visit Information        Provider Department      1/19/2018 4:00 PM Sharri Nicolas DC [facility]        Today's Diagnoses     Segmental dysfunction of sacral region    -  1    Segmental dysfunction of cervical region        Segmental dysfunction of lumbar region        Brachial neuritis or radiculitis        Cervicalgia        Segmental dysfunction of thoracic region           Follow-ups after your visit        Who to contact     If you have questions or need follow up information about today's clinic visit or your schedule please contact [facility] directly at [phone].  Normal or non-critical lab and imaging results will be communicated to you by Luxolahart, letter or phone within 4 business days after the clinic has received the results. If you do not hear from us within 7 days, please contact the clinic through Luxolahart or phone. If you have a critical or abnormal lab result, we will notify you by phone as soon as possible.  Submit refill requests through Giggzo or call your pharmacy and they will forward the refill request to us. Please allow 3 business days for your refill to be completed.          Additional Information About Your Visit        MyChart Information     Giggzo lets you send messages to your doctor, view your test results, renew your prescriptions, schedule appointments and more. To sign up, go to [email or web address] . Click on \"Log in\" on the left side of the screen, which will take you to the Welcome page. Then click on \"Sign up Now\" on the right side of the page.     You will be asked to enter the access code listed below, as well as some personal information. Please follow the directions to create your username and password.     Your access code is: [email or web address]  Expires: 3/1/2018  " 2:12 PM     Your access code will  in 90 days. If you need help or a new code, please call your [place] clinic or [phone].        Care EveryWhere ID     This is your Care EveryWhere ID. This could be used by other organizations to access your [place] medical records  [id number]         Blood Pressure from Last 3 Encounters:   10/13/17 126/80   17 130/80   16 133/83    Weight from Last 3 Encounters:   10/13/17 60.3 kg (133 lb)   17 65.3 kg (144 lb)   17 64 kg (141 lb)              We Performed the Following     CHIROPRAC MANIP,SPINAL,3-4 REGIONS        Primary Care Provider Office Phone # Fax #    Narendra Peters [phone]       [facility] [address] [place] [address]        Equal Access to Services     [facility] : Hadii aad ku hadasho Soomaali, waaxda luqadaha, qaybta kaalmada adeegyada, waxay idiin hayaan jerald hammond . So [facility] [phone].    ATENCIÓN: Si habla español, tiene a ferrari disposición servicios gratuitos de asistencia lingüística. Myron al [phone].    We comply with applicable federal civil rights laws and [place] laws. We do not discriminate on the basis of race, color, national origin, age, disability, sex, sexual orientation, or gender identity.            Thank you!     Thank you for choosing [facility] [facility]  for your care. Our goal is always to provide you with excellent care. Hearing back from our patients is one way we can continue to improve our services. Please take a few minutes to complete the written survey that you may receive in the mail after your visit with us. Thank you!             Your Updated Medication List - Protect others around you: Learn how to safely use, store and throw away your medicines at www.disposemymeds.org.          This list is accurate as of: 18  4:03 PM.  Always use your most recent med list.                   Brand Name Dispense  Instructions for use Diagnosis    cyclobenzaprine 10 MG tablet    FLEXERIL    90 tablet    TAKE ONE TABLET BY MOUTH THREE TIMES A DAY AS NEEDED FOR MUSCLE SPASMS    Cervicalgia       gabapentin 300 MG capsule    NEURONTIN    90 capsule    TAKE ONE CAPSULE BY MOUTH THREE TIMES A DAY    Multiple sclerosis (H)       * levothyroxine 125 MCG tablet    SYNTHROID/LEVOTHROID    30 tablet    TAKE ONE TABLET BY MOUTH EVERY DAY    Hypothyroidism, unspecified type       * levothyroxine 137 MCG tablet    SYNTHROID/LEVOTHROID    90 tablet    Take 1 tablet (137 mcg) by mouth daily    Hypothyroidism, unspecified type       MULTIVITAMIN ADULT PO      Take 1 tablet by mouth daily        sertraline 50 MG tablet    ZOLOFT    30 tablet    TAKE ONE TABLET BY MOUTH EVERY DAY    Major depressive disorder, recurrent episode, mild (H)       traMADol 50 MG tablet    ULTRAM    120 tablet    Take 1 tablet (50 mg) by mouth every 6 hours as needed    Cervicalgia       TYLENOL 325 MG tablet   Generic drug:  acetaminophen     100 tablet    Take 2 tablets (650 mg) by mouth every 4 hours as needed    Gastrointestinal hemorrhage associated with duodenal ulcer       * Notice:  This list has 2 medication(s) that are the same as other medications prescribed for you. Read the directions carefully, and ask your doctor or other care provider to review them with you.

## 2018-02-07 DIAGNOSIS — M54.2 CERVICALGIA: ICD-10-CM

## 2018-02-07 RX ORDER — TRAMADOL HYDROCHLORIDE 50 MG/1
50 TABLET ORAL EVERY 6 HOURS PRN
Qty: 120 TABLET | Refills: 0 | Status: SHIPPED | OUTPATIENT
Start: 2018-02-07 | End: 2018-03-05

## 2018-02-07 NOTE — TELEPHONE ENCOUNTER
Tramadol       Last Written Prescription Date:  1/10/18  Last Fill Quantity: 120,   # refills: 0  Last Office Visit: 10/13/17  Future Office visit:       Routing refill request to provider for review/approval because:  Drug not on the [facility], P or [facility] refill protocol or controlled substance

## 2018-03-03 DIAGNOSIS — E03.9 HYPOTHYROIDISM, UNSPECIFIED TYPE: ICD-10-CM

## 2018-03-05 DIAGNOSIS — M54.2 CERVICALGIA: ICD-10-CM

## 2018-03-05 RX ORDER — LEVOTHYROXINE SODIUM 137 UG/1
TABLET ORAL
Qty: 30 TABLET | Refills: 0 | Status: SHIPPED | OUTPATIENT
Start: 2018-03-05 | End: 2018-04-04

## 2018-03-05 NOTE — TELEPHONE ENCOUNTER
"Last Written Prescription Date:  11/02/2017  Last Fill Quantity: 90,  # refills: 0   Last office visit: 10/13/2017 with prescribing provider:  Dr. Peters   Future Office Visit:    Requested Prescriptions   Pending Prescriptions Disp Refills     levothyroxine (SYNTHROID/LEVOTHROID) 137 MCG tablet [Pharmacy Med Name: LEVOTHYROXINE SODIUM 137MCG TABS] 90 tablet 0     Sig: TAKE ONE TABLET BY MOUTH EVERY DAY    Thyroid Protocol Failed    3/3/2018  2:42 PM       Failed - Normal TSH on file in past 12 months    Recent Labs   Lab Test  10/13/17   1434   TSH  7.00*             Passed - Patient is 12 years or older       Passed - Recent (12 mo) or future (30 days) visit within the authorizing provider's specialty    Patient had office visit in the last year or has a visit in the next 30 days with authorizing provider.  See \"Patient Info\" tab in inbasket, or \"Choose Columns\" in Meds & Orders section of the refill encounter.            Passed - No active pregnancy on record    If patient is pregnant or has had a positive pregnancy test, please check TSH.         Passed - No positive pregnancy test in past 12 months    If patient is pregnant or has had a positive pregnancy test, please check TSH.            "

## 2018-03-05 NOTE — TELEPHONE ENCOUNTER
#30 refilled. Pt needs lab (see note below)- noted on RX  Vanessa Dejesus RN          Notes Recorded by Narendra Peters MD on 10/31/2017 at 1:08 PM  Your thyroid test was off. Indicating a need a higher dose of Synthroid. We need to send him Synthroid 137  g daily  call him 30 pills with one refill to check TSH in 6-8 weeks. Chemistry panel was normal

## 2018-03-07 RX ORDER — TRAMADOL HYDROCHLORIDE 50 MG/1
50 TABLET ORAL EVERY 6 HOURS PRN
Qty: 120 TABLET | Refills: 0 | Status: SHIPPED | OUTPATIENT
Start: 2018-03-07 | End: 2018-04-02

## 2018-03-09 ENCOUNTER — THERAPY VISIT (OUTPATIENT)
Dept: CHIROPRACTIC MEDICINE | Facility: CLINIC | Age: 58
End: 2018-03-09
Payer: COMMERCIAL

## 2018-03-09 DIAGNOSIS — M99.03 SEGMENTAL DYSFUNCTION OF LUMBAR REGION: ICD-10-CM

## 2018-03-09 DIAGNOSIS — M99.01 SEGMENTAL DYSFUNCTION OF CERVICAL REGION: ICD-10-CM

## 2018-03-09 DIAGNOSIS — M99.04 SEGMENTAL DYSFUNCTION OF SACRAL REGION: Primary | ICD-10-CM

## 2018-03-09 DIAGNOSIS — M99.02 SEGMENTAL DYSFUNCTION OF THORACIC REGION: ICD-10-CM

## 2018-03-09 DIAGNOSIS — M54.2 CERVICALGIA: ICD-10-CM

## 2018-03-09 DIAGNOSIS — M54.12 BRACHIAL NEURITIS OR RADICULITIS: ICD-10-CM

## 2018-03-09 PROCEDURE — 98941 CHIROPRACT MANJ 3-4 REGIONS: CPT | Mod: AT | Performed by: CHIROPRACTOR

## 2018-03-09 NOTE — MR AVS SNAPSHOT
"              After Visit Summary   3/9/2018    Jaki Gómez    MRN: [id number]           Patient Information     Date Of Birth          1960        Visit Information        Provider Department      3/9/2018 1:00 PM Sharri Nicolas DC [facility]        Today's Diagnoses     Segmental dysfunction of sacral region    -  1    Segmental dysfunction of cervical region        Segmental dysfunction of lumbar region        Brachial neuritis or radiculitis        Cervicalgia        Segmental dysfunction of thoracic region           Follow-ups after your visit        Who to contact     If you have questions or need follow up information about today's clinic visit or your schedule please contact [facility] [place] [facility] directly at [phone].  Normal or non-critical lab and imaging results will be communicated to you by Augmenthart, letter or phone within 4 business days after the clinic has received the results. If you do not hear from us within 7 days, please contact the clinic through MyChart or phone. If you have a critical or abnormal lab result, we will notify you by phone as soon as possible.  Submit refill requests through ClosetDash or call your pharmacy and they will forward the refill request to us. Please allow 3 business days for your refill to be completed.          Additional Information About Your Visit        MyChart Information     ClosetDash lets you send messages to your doctor, view your test results, renew your prescriptions, schedule appointments and more. To sign up, go to [email or web address] . Click on \"Log in\" on the left side of the screen, which will take you to the Welcome page. Then click on \"Sign up Now\" on the right side of the page.     You will be asked to enter the access code listed below, as well as some personal information. Please follow the directions to create your username and password.     Your access code is: [email or web address]  Expires: 6/7/2018  " 1:16 PM     Your access code will  in 90 days. If you need help or a new code, please call your [place] clinic or [phone].        Care EveryWhere ID     This is your Care EveryWhere ID. This could be used by other organizations to access your [place] medical records  [id number]         Blood Pressure from Last 3 Encounters:   10/13/17 126/80   17 130/80   16 133/83    Weight from Last 3 Encounters:   10/13/17 60.3 kg (133 lb)   17 65.3 kg (144 lb)   17 64 kg (141 lb)              We Performed the Following     CHIROPRAC MANIP,SPINAL,3-4 REGIONS        Primary Care Provider Office Phone # Fax #    Narendra Peters [phone]       [facility] [address] [place] [address]        Equal Access to Services     [facility] : Hadii aad ku hadasho Soomaali, waaxda luqadaha, qaybta kaalmada adeegyada, waxay idiin hayaan jerald hammond . So [facility] [phone].    ATENCIÓN: Si habla español, tiene a ferrari disposición servicios gratuitos de asistencia lingüística. Myron al [phone].    We comply with applicable federal civil rights laws and [place] laws. We do not discriminate on the basis of race, color, national origin, age, disability, sex, sexual orientation, or gender identity.            Thank you!     Thank you for choosing [place] [facility]  for your care. Our goal is always to provide you with excellent care. Hearing back from our patients is one way we can continue to improve our services. Please take a few minutes to complete the written survey that you may receive in the mail after your visit with us. Thank you!             Your Updated Medication List - Protect others around you: Learn how to safely use, store and throw away your medicines at www.disposemymeds.org.          This list is accurate as of 3/9/18  1:16 PM.  Always use your most recent med list.                   Brand Name Dispense  Instructions for use Diagnosis    cyclobenzaprine 10 MG tablet    FLEXERIL    90 tablet    TAKE ONE TABLET BY MOUTH THREE TIMES A DAY AS NEEDED FOR MUSCLE SPASMS    Cervicalgia       gabapentin 300 MG capsule    NEURONTIN    90 capsule    TAKE ONE CAPSULE BY MOUTH THREE TIMES A DAY    Multiple sclerosis (H)       * levothyroxine 125 MCG tablet    SYNTHROID/LEVOTHROID    30 tablet    TAKE ONE TABLET BY MOUTH EVERY DAY    Hypothyroidism, unspecified type       * levothyroxine 137 MCG tablet    SYNTHROID/LEVOTHROID    30 tablet    TAKE ONE TABLET BY MOUTH EVERY DAY    Hypothyroidism, unspecified type       MULTIVITAMIN ADULT PO      Take 1 tablet by mouth daily        sertraline 50 MG tablet    ZOLOFT    30 tablet    TAKE ONE TABLET BY MOUTH EVERY DAY    Major depressive disorder, recurrent episode, mild (H)       traMADol 50 MG tablet    ULTRAM    120 tablet    Take 1 tablet (50 mg) by mouth every 6 hours as needed    Cervicalgia       TYLENOL 325 MG tablet   Generic drug:  acetaminophen     100 tablet    Take 2 tablets (650 mg) by mouth every 4 hours as needed    Gastrointestinal hemorrhage associated with duodenal ulcer       * Notice:  This list has 2 medication(s) that are the same as other medications prescribed for you. Read the directions carefully, and ask your doctor or other care provider to review them with you.

## 2018-04-02 DIAGNOSIS — M54.2 CERVICALGIA: ICD-10-CM

## 2018-04-02 RX ORDER — TRAMADOL HYDROCHLORIDE 50 MG/1
50 TABLET ORAL EVERY 6 HOURS PRN
Qty: 120 TABLET | Refills: 0 | Status: SHIPPED | OUTPATIENT
Start: 2018-04-02 | End: 2018-04-30

## 2018-04-02 NOTE — TELEPHONE ENCOUNTER
Tramadol 50 MG       Last Written Prescription Date:  3/7/18  Last Fill Quantity: 120,   # refills: 0  Last Office Visit: 10/13/17  Future Office visit:       Routing refill request to provider for review/approval because:  Drug not on the [facility], P or [facility] refill protocol or controlled substance

## 2018-04-04 DIAGNOSIS — E03.9 HYPOTHYROIDISM, UNSPECIFIED TYPE: ICD-10-CM

## 2018-04-04 NOTE — TELEPHONE ENCOUNTER
"Requested Prescriptions   Pending Prescriptions Disp Refills     levothyroxine (SYNTHROID/LEVOTHROID) 137 MCG tablet [Pharmacy Med Name: LEVOTHYROXINE SODIUM 137MCG TABS] 30 tablet 0     Sig: TAKE ONE TABLET BY MOUTH EVERY DAY (THYROID LABS NEEDED FOR FURTHER REFILLS)    Thyroid Protocol Failed    4/4/2018  3:28 PM       Failed - Normal TSH on file in past 12 months    Recent Labs   Lab Test  10/13/17   1434   TSH  7.00*             Passed - Patient is 12 years or older       Passed - Recent (12 mo) or future (30 days) visit within the authorizing provider's specialty    Patient had office visit in the last 12 months or has a visit in the next 30 days with authorizing provider or within the authorizing provider's specialty.  See \"Patient Info\" tab in inbasket, or \"Choose Columns\" in Meds & Orders section of the refill encounter.           Passed - No active pregnancy on record    If patient is pregnant or has had a positive pregnancy test, please check TSH.         Passed - No positive pregnancy test in past 12 months    If patient is pregnant or has had a positive pregnancy test, please check TSH.            Last Written Prescription Date:  3/5/18  Last Fill Quantity: 30,  # refills: 0   Last Office Visit with [facility] or [facility] prescribing provider:  10/13/18   Future Office Visit:       "

## 2018-04-04 NOTE — TELEPHONE ENCOUNTER
Routing refill request to provider for review/approval because:  Labs out of range:  Thyroid labs in October were abnormal and pt was told to recheck in 6-8 weeks and has not followed up.  Got a 30 day supply a month ago.  Routing to reception to assist with scheduling and to PCP to address refill.     Component      Latest Ref Rng & Units 10/13/2017   TSH      0.40 - 4.00 mU/L 7.00 (H)   T4 Free      0.76 - 1.46 ng/dL 0.72 (L)     Notes Recorded by Narendra Peters MD on 10/31/2017 at 1:08 PM  Your thyroid test was off. Indicating a need a higher dose of Synthroid. We need to send him Synthroid 137  g daily  call him 30 pills with one refill to check TSH in 6-8 weeks.      Elissa Christopher RN. . .  4/4/2018, 4:23 PM

## 2018-04-11 ENCOUNTER — TELEPHONE (OUTPATIENT)
Dept: FAMILY MEDICINE | Facility: CLINIC | Age: 58
End: 2018-04-11

## 2018-04-11 ENCOUNTER — THERAPY VISIT (OUTPATIENT)
Dept: CHIROPRACTIC MEDICINE | Facility: CLINIC | Age: 58
End: 2018-04-11
Payer: COMMERCIAL

## 2018-04-11 DIAGNOSIS — M99.01 SEGMENTAL DYSFUNCTION OF CERVICAL REGION: ICD-10-CM

## 2018-04-11 DIAGNOSIS — M99.03 SEGMENTAL DYSFUNCTION OF LUMBAR REGION: ICD-10-CM

## 2018-04-11 DIAGNOSIS — M54.12 BRACHIAL NEURITIS OR RADICULITIS: ICD-10-CM

## 2018-04-11 DIAGNOSIS — M99.02 SEGMENTAL DYSFUNCTION OF THORACIC REGION: ICD-10-CM

## 2018-04-11 DIAGNOSIS — M54.2 CERVICALGIA: ICD-10-CM

## 2018-04-11 PROCEDURE — 98941 CHIROPRACT MANJ 3-4 REGIONS: CPT | Mod: AT | Performed by: CHIROPRACTOR

## 2018-04-11 RX ORDER — LEVOTHYROXINE SODIUM 137 UG/1
TABLET ORAL
Qty: 30 TABLET | Refills: 0 | Status: SHIPPED | OUTPATIENT
Start: 2018-04-11 | End: 2018-05-15 | Stop reason: ALTCHOICE

## 2018-04-11 NOTE — TELEPHONE ENCOUNTER
"Patient is due for a PHQ-9.  Index start date:3/13/2018  Index end date:5/13/2018    Please call patient. Pt has an appt scheduled for 4/16/2018. I have put \"Give PHQ-9\" in the appt note and will postpone encounter. Rachell Pérez CMA ([facility])      "

## 2018-04-11 NOTE — MR AVS SNAPSHOT
After Visit Summary   4/11/2018    Jaki Gómez    MRN: [id number]           Patient Information     Date Of Birth          1960        Visit Information        Provider Department      4/11/2018 2:45 PM Sharri Nicolas DC [facility]        Today's Diagnoses     Segmental dysfunction of cervical region        Segmental dysfunction of lumbar region        Brachial neuritis or radiculitis        Cervicalgia        Segmental dysfunction of thoracic region           Follow-ups after your visit        Your next 10 appointments already scheduled     Apr 16, 2018  1:20 PM CDT   [facility]    [address] [place] [id number] [phone]           Bring a current list of meds and any records pertaining to this visit. For Physicals, please bring immunization records and any forms needing to be filled out. Please arrive 10 minutes early to complete paperwork.              Who to contact     If you have questions or need follow up information about today's clinic visit or your schedule please contact [facility] directly at [phone].  Normal or non-critical lab and imaging results will be communicated to you by GameAnalyticshart, letter or phone within 4 business days after the clinic has received the results. If you do not hear from us within 7 days, please contact the clinic through BiggiFit or phone. If you have a critical or abnormal lab result, we will notify you by phone as soon as possible.  Submit refill requests through [employer] in [employer] or call your pharmacy and they will forward the refill request to us. Please allow 3 business days for your refill to be completed.          Additional Information About Your Visit        GameAnalyticshart Information     The Edge in [employer] lets you send messages to your doctor, view your test results, renew your prescriptions, schedule appointments and  "more. To sign up, go to [email or web address] . Click on \"Log in\" on the left side of the screen, which will take you to the Welcome page. Then click on \"Sign up Now\" on the right side of the page.     You will be asked to enter the access code listed below, as well as some personal information. Please follow the directions to create your username and password.     Your access code is: [email or web address]  Expires: 2018  2:16 PM     Your access code will  in 90 days. If you need help or a new code, please call your [place] clinic or [phone].        Care EveryWhere ID     This is your Care EveryWhere ID. This could be used by other organizations to access your [place] medical records  [id number]         Blood Pressure from Last 3 Encounters:   10/13/17 126/80   17 130/80   16 133/83    Weight from Last 3 Encounters:   10/13/17 60.3 kg (133 lb)   17 65.3 kg (144 lb)   17 64 kg (141 lb)              We Performed the Following     CHIROPRAC MANIP,SPINAL,3-4 REGIONS        Primary Care Provider Office Phone # Fax #    Narendra Peters [phone] [address] [address]        Equal Access to Services     [facility] : Hadii delmy alano Sohussein, waaxda luqadaha, qaybta kaalmada melanie, dann hammond . So [facility] [phone].    ATENCIÓN: Si habla español, tiene a ferrari disposición servicios gratuitos de asistencia lingüística. Myron al [phone].    We comply with applicable federal civil rights laws and [place] laws. We do not discriminate on the basis of race, color, national origin, age, disability, sex, sexual orientation, or gender identity.            Thank you!     Thank you for choosing [place] [facility]  for your care. Our goal is always to provide you with excellent care. Hearing back from our patients is one way we can continue to improve our services. Please take a few " minutes to complete the written survey that you may receive in the mail after your visit with us. Thank you!             Your Updated Medication List - Protect others around you: Learn how to safely use, store and throw away your medicines at www.disposemymeds.org.          This list is accurate as of 4/11/18  2:54 PM.  Always use your most recent med list.                   Brand Name Dispense Instructions for use Diagnosis    cyclobenzaprine 10 MG tablet    FLEXERIL    90 tablet    TAKE ONE TABLET BY MOUTH THREE TIMES A DAY AS NEEDED FOR MUSCLE SPASMS    Cervicalgia       gabapentin 300 MG capsule    NEURONTIN    90 capsule    TAKE ONE CAPSULE BY MOUTH THREE TIMES A DAY    Multiple sclerosis (H)       * levothyroxine 125 MCG tablet    SYNTHROID/LEVOTHROID    30 tablet    TAKE ONE TABLET BY MOUTH EVERY DAY    Hypothyroidism, unspecified type       * levothyroxine 137 MCG tablet    SYNTHROID/LEVOTHROID    30 tablet    TAKE ONE TABLET BY MOUTH EVERY DAY (THYROID LABS NEEDED FOR FURTHER REFILLS)    Hypothyroidism, unspecified type       MULTIVITAMIN ADULT PO      Take 1 tablet by mouth daily        sertraline 50 MG tablet    ZOLOFT    30 tablet    TAKE ONE TABLET BY MOUTH EVERY DAY    Major depressive disorder, recurrent episode, mild (H)       traMADol 50 MG tablet    ULTRAM    120 tablet    Take 1 tablet (50 mg) by mouth every 6 hours as needed    Cervicalgia       TYLENOL 325 MG tablet   Generic drug:  acetaminophen     100 tablet    Take 2 tablets (650 mg) by mouth every 4 hours as needed    Gastrointestinal hemorrhage associated with duodenal ulcer       * Notice:  This list has 2 medication(s) that are the same as other medications prescribed for you. Read the directions carefully, and ask your doctor or other care provider to review them with you.

## 2018-04-11 NOTE — PROGRESS NOTES
"Visit #:  10 of 12    Subjective:  Jaki Gómez is a 55 year old female who is seen in f/u up for:        Segmental dysfunction of cervical region  Segmental dysfunction of lumbar region  Brachial neuritis or radiculitis  Cervicalgia  Segmental dysfunction of thoracic region.     Since last visit on 3/9/2018,  Jaki Gómez reports: that she has been painting for the last week and she is feeling sore everywhere. Currently she feels pain \"all over.\" Her right shoulder has really been bothering her. She states that she should have been here weeks ago.         ADL's : ADLs have been difficult with exacerbation of pain.          Objective:  The following was observed:    P: pain elicited on palpation, across lower back, upper back  A: static palpation demonstrates intersegmental asymmetry, as noted  R: motion palpation notes restricted motion  T: hypertonicity at:  Lumbar Paraspinals bilaterally, traps      Segmental spinal dysfunction/restrictions found at:  C2 RR, LRR  C5 LR, RRR  T1 LR, RRR  T5 E, FR   T10 E, FR  L4 LR, RRR  Right SI joint posterior      Assessment:    Diagnoses:      1. Segmental dysfunction of cervical region    2. Segmental dysfunction of lumbar region    3. Brachial neuritis or radiculitis    4. Cervicalgia    5. Segmental dysfunction of thoracic region        Patient's condition:  Patient had restrictions pre-manipulation and Patient had decreased motion prior to manipulation    Treatment effectiveness:  Post manipulation there is better intersegmental movement and Patient claims to feel looser post manipulation      Procedures:  CMT:  32712 Chiropractic manipulative treatment 3-4 regions performed   Cervical: Diversified, C2, C5, Supine  Thoracic: Diversified, T1, T5, T10, Prone   Lumbar: Drop assist, Flexion Distraction, L4, Prone  Pelvis: Drop Assist, Right SI joint, Prone      Modalities:  MSTM to affected musculature    Therapeutic procedures:  Gave ice instructions post-adjustment, as " needed.      Prognosis: Good    Progress towards Goals: Patient has been improving with care but had a recent flare-up of pain with painting.     Recommendations:    Instructions:ice 20 minutes every other hour as needed and stretch as instructed at visit     Follow-up:  Return to care as needed.

## 2018-04-16 NOTE — TELEPHONE ENCOUNTER
Pt cancelled appt. Please call to update PHQ-9. Rachell Pérez CMA ([facility])

## 2018-04-18 DIAGNOSIS — G35 MULTIPLE SCLEROSIS (H): ICD-10-CM

## 2018-04-18 RX ORDER — GABAPENTIN 300 MG/1
CAPSULE ORAL
Qty: 90 CAPSULE | Refills: 11 | Status: SHIPPED | OUTPATIENT
Start: 2018-04-18 | End: 2019-04-24

## 2018-04-18 NOTE — TELEPHONE ENCOUNTER
Gabapentin 300 MG       Last Written Prescription Date:  3/24/17  Last Fill Quantity: 90,   # refills: 11  Last Office Visit: 4/16/18  Future Office visit:       Routing refill request to provider for review/approval because:  Drug not on the [facility], P or [facility] refill protocol or controlled substance

## 2018-04-20 ASSESSMENT — PATIENT HEALTH QUESTIONNAIRE - PHQ9: SUM OF ALL RESPONSES TO PHQ QUESTIONS 1-9: 17

## 2018-04-30 DIAGNOSIS — M54.2 CERVICALGIA: ICD-10-CM

## 2018-04-30 RX ORDER — TRAMADOL HYDROCHLORIDE 50 MG/1
50 TABLET ORAL EVERY 6 HOURS PRN
Qty: 120 TABLET | Refills: 0 | Status: SHIPPED | OUTPATIENT
Start: 2018-04-30 | End: 2018-05-29

## 2018-04-30 NOTE — TELEPHONE ENCOUNTER
Tramadol       Last Written Prescription Date:  4/2/18  Last Fill Quantity: 120,   # refills: 0  Last Office Visit: 4/16/18  Future Office visit:       Routing refill request to provider for review/approval because:  Drug not on the [facility], [facility] or [facility] refill protocol or controlled substance

## 2018-05-04 ENCOUNTER — THERAPY VISIT (OUTPATIENT)
Dept: CHIROPRACTIC MEDICINE | Facility: CLINIC | Age: 58
End: 2018-05-04
Payer: COMMERCIAL

## 2018-05-04 DIAGNOSIS — M99.01 SEGMENTAL DYSFUNCTION OF CERVICAL REGION: ICD-10-CM

## 2018-05-04 DIAGNOSIS — M54.12 BRACHIAL NEURITIS OR RADICULITIS: ICD-10-CM

## 2018-05-04 DIAGNOSIS — M99.04 SEGMENTAL DYSFUNCTION OF SACRAL REGION: Primary | ICD-10-CM

## 2018-05-04 DIAGNOSIS — M54.2 CERVICALGIA: ICD-10-CM

## 2018-05-04 DIAGNOSIS — M99.03 SEGMENTAL DYSFUNCTION OF LUMBAR REGION: ICD-10-CM

## 2018-05-04 DIAGNOSIS — M99.02 SEGMENTAL DYSFUNCTION OF THORACIC REGION: ICD-10-CM

## 2018-05-04 PROCEDURE — 98941 CHIROPRACT MANJ 3-4 REGIONS: CPT | Mod: AT | Performed by: CHIROPRACTOR

## 2018-05-04 PROCEDURE — 97035 APP MDLTY 1+ULTRASOUND EA 15: CPT | Performed by: CHIROPRACTOR

## 2018-05-04 NOTE — MR AVS SNAPSHOT
After Visit Summary   5/4/2018    Jaki Gómez    MRN: [id number]           Patient Information     Date Of Birth          1960        Visit Information        Provider Department      5/4/2018 11:45 AM Sharri Nicolas DC [facility]        Today's Diagnoses     Segmental dysfunction of sacral region    -  1    Segmental dysfunction of cervical region        Segmental dysfunction of lumbar region        Brachial neuritis or radiculitis        Cervicalgia        Segmental dysfunction of thoracic region           Follow-ups after your visit        Your next 10 appointments already scheduled     May 09, 2018  2:00 PM CDT   [facility]    [address] [place] [id number] [phone]           Bring a current list of meds and any records pertaining to this visit. For Physicals, please bring immunization records and any forms needing to be filled out. Please arrive 10 minutes early to complete paperwork.              Who to contact     If you have questions or need follow up information about today's clinic visit or your schedule please contact [facility] directly at [phone].  Normal or non-critical lab and imaging results will be communicated to you by [employer], letter or phone within 4 business days after the clinic has received the results. If you do not hear from us within 7 days, please contact the clinic through [employer] or phone. If you have a critical or abnormal lab result, we will notify you by phone as soon as possible.  Submit refill requests through GOOD or call your pharmacy and they will forward the refill request to us. Please allow 3 business days for your refill to be completed.          Additional Information About Your Visit        ActiveGiftharPlay With Pictures / HangPic Information     GOOD lets you send messages to your doctor, view your test results,  "renew your prescriptions, schedule appointments and more. To sign up, go to [email or web address] . Click on \"Log in\" on the left side of the screen, which will take you to the Welcome page. Then click on \"Sign up Now\" on the right side of the page.     You will be asked to enter the access code listed below, as well as some personal information. Please follow the directions to create your username and password.     Your access code is: [email or web address]  Expires: 2018  2:16 PM     Your access code will  in 90 days. If you need help or a new code, please call your [place] clinic or [phone].        Care EveryWhere ID     This is your Care EveryWhere ID. This could be used by other organizations to access your [place] medical records  [id number]         Blood Pressure from Last 3 Encounters:   10/13/17 126/80   17 130/80   16 133/83    Weight from Last 3 Encounters:   10/13/17 60.3 kg (133 lb)   17 65.3 kg (144 lb)   17 64 kg (141 lb)              We Performed the Following     CHIROPRAC MANIP,SPINAL,3-4 REGIONS     ULTRASOUND THERAPY        Primary Care Provider Office Phone # Fax #    Narendra Peters [phone] [address] [facility] [address]        Equal Access to Services     [facility] : Hadii aad ku hadasho Soomaali, waaxda luqadaha, qaybta kaalmada adeegyada, dann hammond . So [facility] [phone].    ATENCIÓN: Si habla español, tiene a ferrari disposición servicios gratuitos de asistencia lingüística. Myron banegas [phone].    We comply with applicable federal civil rights laws and [place] laws. We do not discriminate on the basis of race, color, national origin, age, disability, sex, sexual orientation, or gender identity.            Thank you!     Thank you for choosing [facility]  for your care. Our goal is always to provide you with excellent care. Hearing back from our patients " is one way we can continue to improve our services. Please take a few minutes to complete the written survey that you may receive in the mail after your visit with us. Thank you!             Your Updated Medication List - Protect others around you: Learn how to safely use, store and throw away your medicines at www.disposemymeds.org.          This list is accurate as of 5/4/18 11:57 AM.  Always use your most recent med list.                   Brand Name Dispense Instructions for use Diagnosis    cyclobenzaprine 10 MG tablet    FLEXERIL    90 tablet    TAKE ONE TABLET BY MOUTH THREE TIMES A DAY AS NEEDED FOR MUSCLE SPASMS    Cervicalgia       gabapentin 300 MG capsule    NEURONTIN    90 capsule    TAKE ONE CAPSULE BY MOUTH THREE TIMES A DAY    Multiple sclerosis (H)       * levothyroxine 125 MCG tablet    SYNTHROID/LEVOTHROID    30 tablet    TAKE ONE TABLET BY MOUTH EVERY DAY    Hypothyroidism, unspecified type       * levothyroxine 137 MCG tablet    SYNTHROID/LEVOTHROID    30 tablet    TAKE ONE TABLET BY MOUTH EVERY DAY (THYROID LABS NEEDED FOR FURTHER REFILLS)    Hypothyroidism, unspecified type       MULTIVITAMIN ADULT PO      Take 1 tablet by mouth daily        sertraline 50 MG tablet    ZOLOFT    30 tablet    TAKE ONE TABLET BY MOUTH EVERY DAY    Major depressive disorder, recurrent episode, mild (H)       traMADol 50 MG tablet    ULTRAM    120 tablet    Take 1 tablet (50 mg) by mouth every 6 hours as needed    Cervicalgia       TYLENOL 325 MG tablet   Generic drug:  acetaminophen     100 tablet    Take 2 tablets (650 mg) by mouth every 4 hours as needed    Gastrointestinal hemorrhage associated with duodenal ulcer       * Notice:  This list has 2 medication(s) that are the same as other medications prescribed for you. Read the directions carefully, and ask your doctor or other care provider to review them with you.

## 2018-05-04 NOTE — PROGRESS NOTES
Visit #:  11 of 12    Subjective:  Jaki Gómez is a 55 year old female who is seen in f/u up for:        Segmental dysfunction of cervical region  Segmental dysfunction of lumbar region  Brachial neuritis or radiculitis  Cervicalgia  Segmental dysfunction of thoracic region.     Since last visit on 4/11/2018,  Jaki Gómez reports: that she was flinging birdseed on Tuesday and felt pain immediately in her lower back that felt like a pull. She feels pain on the left side, rated 8/10. The pain is not getting better, even with ice and ibuprofen. Her ADLs have all been affected.        ADL's : ADLs have been difficult with exacerbation of pain.          Objective:  The following was observed:    P: pain elicited on palpation, across lower back  A: static palpation demonstrates intersegmental asymmetry, as noted  R: motion palpation notes restricted motion  T: hypertonicity at:  Lumbar Paraspinals bilaterally      Segmental spinal dysfunction/restrictions found at:  C2 RR, LRR  C5 LR, RRR  T1 RR, LRR  T5 E, FR   T10 E, FR  L4 LR, RRR  Left SI joint posterior      Assessment:    Diagnoses:      1. Segmental dysfunction of cervical region    2. Segmental dysfunction of lumbar region    3. Brachial neuritis or radiculitis    4. Cervicalgia    5. Segmental dysfunction of thoracic region        Patient's condition:  Patient had restrictions pre-manipulation and Patient had decreased motion prior to manipulation    Treatment effectiveness:  Post manipulation there is better intersegmental movement and Patient claims to feel looser post manipulation      Procedures:  CMT:  18438 Chiropractic manipulative treatment 3-4 regions performed   Cervical: Diversified, C2, C5, Supine  Thoracic: Diversified, T1, T5, T10, Prone   Lumbar: Drop assist, Flexion Distraction, L4, Prone  Pelvis: Drop Assist, Left SI joint, Prone      Modalities:  MSTM to affected musculature  US to left lumbar paraspinals, 1.0 guidry, continuous, 8  min    Therapeutic procedures:  Gave ice instructions post-adjustment, as needed.      Prognosis: Good    Progress towards Goals: Patient has been improving with care but had a recent flare-up of pain with painting.     Recommendations:    Instructions:ice 20 minutes every other hour as needed and stretch as instructed at visit     Follow-up:  Return to care next week. Patient felt 50% improvement when she left today.

## 2018-05-09 ENCOUNTER — THERAPY VISIT (OUTPATIENT)
Dept: CHIROPRACTIC MEDICINE | Facility: CLINIC | Age: 58
End: 2018-05-09
Payer: COMMERCIAL

## 2018-05-09 ENCOUNTER — OFFICE VISIT (OUTPATIENT)
Dept: FAMILY MEDICINE | Facility: CLINIC | Age: 58
End: 2018-05-09
Payer: COMMERCIAL

## 2018-05-09 VITALS
OXYGEN SATURATION: 100 % | TEMPERATURE: 97.9 F | RESPIRATION RATE: 20 BRPM | BODY MASS INDEX: 23.42 KG/M2 | SYSTOLIC BLOOD PRESSURE: 122 MMHG | HEART RATE: 90 BPM | DIASTOLIC BLOOD PRESSURE: 80 MMHG | HEIGHT: 62 IN | WEIGHT: 127.3 LBS

## 2018-05-09 DIAGNOSIS — M99.01 SEGMENTAL DYSFUNCTION OF CERVICAL REGION: ICD-10-CM

## 2018-05-09 DIAGNOSIS — M99.03 SEGMENTAL DYSFUNCTION OF LUMBAR REGION: ICD-10-CM

## 2018-05-09 DIAGNOSIS — M54.12 BRACHIAL NEURITIS OR RADICULITIS: ICD-10-CM

## 2018-05-09 DIAGNOSIS — M54.16 LUMBAR BACK PAIN WITH RADICULOPATHY AFFECTING LEFT LOWER EXTREMITY: Primary | ICD-10-CM

## 2018-05-09 DIAGNOSIS — E03.9 HYPOTHYROIDISM, UNSPECIFIED TYPE: ICD-10-CM

## 2018-05-09 DIAGNOSIS — M54.2 CERVICALGIA: ICD-10-CM

## 2018-05-09 DIAGNOSIS — M54.50 LUMBAGO: ICD-10-CM

## 2018-05-09 DIAGNOSIS — F40.240 CLAUSTROPHOBIA: Primary | ICD-10-CM

## 2018-05-09 DIAGNOSIS — M99.04 SEGMENTAL DYSFUNCTION OF SACRAL REGION: Primary | ICD-10-CM

## 2018-05-09 DIAGNOSIS — M99.02 SEGMENTAL DYSFUNCTION OF THORACIC REGION: ICD-10-CM

## 2018-05-09 LAB
T4 FREE SERPL-MCNC: 1.23 NG/DL (ref 0.76–1.46)
TSH SERPL DL<=0.005 MIU/L-ACNC: 0.2 MU/L (ref 0.4–4)

## 2018-05-09 PROCEDURE — 98941 CHIROPRACT MANJ 3-4 REGIONS: CPT | Mod: AT | Performed by: CHIROPRACTOR

## 2018-05-09 PROCEDURE — 84439 ASSAY OF FREE THYROXINE: CPT | Performed by: FAMILY MEDICINE

## 2018-05-09 PROCEDURE — 97035 APP MDLTY 1+ULTRASOUND EA 15: CPT | Performed by: CHIROPRACTOR

## 2018-05-09 PROCEDURE — 99214 OFFICE O/P EST MOD 30 MIN: CPT | Performed by: FAMILY MEDICINE

## 2018-05-09 PROCEDURE — 36415 COLL VENOUS BLD VENIPUNCTURE: CPT | Performed by: FAMILY MEDICINE

## 2018-05-09 PROCEDURE — 84443 ASSAY THYROID STIM HORMONE: CPT | Performed by: FAMILY MEDICINE

## 2018-05-09 RX ORDER — ALPRAZOLAM 0.5 MG
TABLET ORAL
Qty: 2 TABLET | Refills: 0 | Status: SHIPPED | OUTPATIENT
Start: 2018-05-09 | End: 2019-01-31

## 2018-05-09 RX ORDER — PREDNISONE 20 MG/1
TABLET ORAL
Qty: 18 TABLET | Refills: 1 | Status: SHIPPED | OUTPATIENT
Start: 2018-05-09 | End: 2019-01-31

## 2018-05-09 ASSESSMENT — PAIN SCALES - GENERAL: PAINLEVEL: EXTREME PAIN (8)

## 2018-05-09 NOTE — MR AVS SNAPSHOT
After Visit Summary   5/9/2018    Jaki Gómez    MRN: [id number]           Patient Information     Date Of Birth          1960        Visit Information        Provider Department      5/9/2018 1:00 PM Sharri Nicolas, [facility] [place] [facility]        Today's Diagnoses     Segmental dysfunction of sacral region    -  1    Segmental dysfunction of cervical region        Segmental dysfunction of lumbar region        Brachial neuritis or radiculitis        Cervicalgia        Segmental dysfunction of thoracic region        Lumbago           Follow-ups after your visit        Your next 10 appointments already scheduled     May 09, 2018  2:00 PM CDT   [facility]    [address] [facility] [address] [phone]           Bring a current list of meds and any records pertaining to this visit. For Physicals, please bring immunization records and any forms needing to be filled out. Please arrive 10 minutes early to complete paperwork.              Who to contact     If you have questions or need follow up information about today's clinic visit or your schedule please contact [facility] directly at [phone].  Normal or non-critical lab and imaging results will be communicated to you by [employer], letter or phone within 4 business days after the clinic has received the results. If you do not hear from us within 7 days, please contact the clinic through [email or web address] or phone. If you have a critical or abnormal lab result, we will notify you by phone as soon as possible.  Submit refill requests through [employer] or call your pharmacy and they will forward the refill request to us. Please allow 3 business days for your refill to be completed.          Additional Information About Your Visit        [employer] Information     [employer] lets you send messages to your doctor, view your  "test results, renew your prescriptions, schedule appointments and more. To sign up, go to [email or web address] . Click on \"Log in\" on the left side of the screen, which will take you to the Welcome page. Then click on \"Sign up Now\" on the right side of the page.     You will be asked to enter the access code listed below, as well as some personal information. Please follow the directions to create your username and password.     Your access code is: [email or web address]  Expires: 2018  2:16 PM     Your access code will  in 90 days. If you need help or a new code, please call your [place] clinic or [phone].        Care EveryWhere ID     This is your Care EveryWhere ID. This could be used by other organizations to access your [place] medical records  [id number]         Blood Pressure from Last 3 Encounters:   10/13/17 126/80   17 130/80   16 133/83    Weight from Last 3 Encounters:   10/13/17 60.3 kg (133 lb)   17 65.3 kg (144 lb)   17 64 kg (141 lb)              We Performed the Following     CHIROPRAC MANIP,SPINAL,3-4 REGIONS     ULTRASOUND THERAPY        Primary Care Provider Office Phone # Fax #    Narendra Peters [phone] [address] [facility] [address]        Equal Access to Services     [facility] : Hadii delmy ku hadasho Sojulianneali, waaxda luqadaha, qaybta kaalmada adeegyada, dann hammond . So [facility] [phone].    ATENCIÓN: Si habla español, tiene a ferrari disposición servicios gratuitos de asistencia lingüística. Llame al [phone].    We comply with applicable federal civil rights laws and [place] laws. We do not discriminate on the basis of race, color, national origin, age, disability, sex, sexual orientation, or gender identity.            Thank you!     Thank you for choosing [facility]  for your care. Our goal is always to provide you with excellent care. Hearing back from " our patients is one way we can continue to improve our services. Please take a few minutes to complete the written survey that you may receive in the mail after your visit with us. Thank you!             Your Updated Medication List - Protect others around you: Learn how to safely use, store and throw away your medicines at www.disposemymeds.org.          This list is accurate as of 5/9/18  1:05 PM.  Always use your most recent med list.                   Brand Name Dispense Instructions for use Diagnosis    cyclobenzaprine 10 MG tablet    FLEXERIL    90 tablet    TAKE ONE TABLET BY MOUTH THREE TIMES A DAY AS NEEDED FOR MUSCLE SPASMS    Cervicalgia       gabapentin 300 MG capsule    NEURONTIN    90 capsule    TAKE ONE CAPSULE BY MOUTH THREE TIMES A DAY    Multiple sclerosis (H)       * levothyroxine 125 MCG tablet    SYNTHROID/LEVOTHROID    30 tablet    TAKE ONE TABLET BY MOUTH EVERY DAY    Hypothyroidism, unspecified type       * levothyroxine 137 MCG tablet    SYNTHROID/LEVOTHROID    30 tablet    TAKE ONE TABLET BY MOUTH EVERY DAY (THYROID LABS NEEDED FOR FURTHER REFILLS)    Hypothyroidism, unspecified type       MULTIVITAMIN ADULT PO      Take 1 tablet by mouth daily        sertraline 50 MG tablet    ZOLOFT    30 tablet    TAKE ONE TABLET BY MOUTH EVERY DAY    Major depressive disorder, recurrent episode, mild (H)       traMADol 50 MG tablet    ULTRAM    120 tablet    Take 1 tablet (50 mg) by mouth every 6 hours as needed    Cervicalgia       TYLENOL 325 MG tablet   Generic drug:  acetaminophen     100 tablet    Take 2 tablets (650 mg) by mouth every 4 hours as needed    Gastrointestinal hemorrhage associated with duodenal ulcer       * Notice:  This list has 2 medication(s) that are the same as other medications prescribed for you. Read the directions carefully, and ask your doctor or other care provider to review them with you.

## 2018-05-09 NOTE — PROGRESS NOTES
"Visit #:  12 of 12    Subjective:  Jaki Gómez is a 55 year old female who is seen in f/u up for:        Segmental dysfunction of cervical region  Segmental dysfunction of lumbar region  Brachial neuritis or radiculitis  Cervicalgia  Segmental dysfunction of thoracic region.     Since last visit on 5/4/2018,  Jaki Gómez reports: that she is not any better today. She is still feeling pain in her left SI region. She states that she can bend forward and touch her toes, but she can't lift her left leg. Her pain is \"8.1\" today. She is seeing Dr. Peters after this and states that she needs relief because \"this is awful\" and the worst its ever been.       ADL's : ADLs have been difficult with exacerbation of pain.          Objective:  The following was observed:    P: pain elicited on palpation, across lower back  A: static palpation demonstrates intersegmental asymmetry, as noted  R: motion palpation notes restricted motion  T: hypertonicity at:  Lumbar Paraspinals bilaterally      Segmental spinal dysfunction/restrictions found at:    T1 RR, LRR  T5 E, FR   T10 E, FR  L5 LR, RRR  Left SI joint posterior      Assessment:    Diagnoses:      1. Segmental dysfunction of cervical region    2. Segmental dysfunction of lumbar region    3. Brachial neuritis or radiculitis    4. Cervicalgia    5. Segmental dysfunction of thoracic region        Patient's condition:  Patient had restrictions pre-manipulation and Patient had decreased motion prior to manipulation    Treatment effectiveness:  Post manipulation there is better intersegmental movement and Patient claims to feel looser post manipulation      Procedures:  CMT:  85076 Chiropractic manipulative treatment 3-4 regions performed     Thoracic: Diversified, T1, T5, T10, Prone   Lumbar: Drop assist, Flexion Distraction, L5, Prone  Pelvis: Drop Assist, Left SI joint, Prone      Modalities:  MSTM to affected musculature  US to left lumbar paraspinals, 1.0 guidry, " continuous, 8 min    Therapeutic procedures:  Gave ice instructions post-adjustment, as needed.      Prognosis: Good    Progress towards Goals: Patient has been improving with care but had a recent flare-up of pain with flinging birdseed.    Recommendations:    Instructions:ice 20 minutes every other hour as needed and stretch as instructed at visit     Follow-up:  Return to care after MRI.

## 2018-05-09 NOTE — TELEPHONE ENCOUNTER
Reason for Call:  Medication or medication refill:    Do you use a [facility]?  Name [facility] and phone [facility] the [phone]    Name of the medication requested: xanax     Other request: patient is having an MRI and would like a xanax so she can lay still as she shakes     Can we leave a detailed message on this number? YES    Phone number patient can be reached at: Home number on file [phone] (home)    Best Time: any    Call taken on 5/9/2018 at 4:16 PM by Kaitlin Merrill

## 2018-05-09 NOTE — MR AVS SNAPSHOT
"              After Visit Summary   2018    Jaki Gómez    MRN: [id number]           Patient Information     Date Of Birth          1960        Visit Information        Provider Department      2018 2:00 PM Narendra Peters MD [facility]        Today's Diagnoses     Lumbar back pain with radiculopathy affecting left lower extremity    -  1    Hypothyroidism, unspecified type           Follow-ups after your visit        Who to contact     If you have questions or need follow up information about today's clinic visit or your schedule please contact [facility] directly at [phone].  Normal or non-critical lab and imaging results will be communicated to you by Ze[facility], letter or phone within 4 business days after the clinic has received the results. If you do not hear from us within 7 days, please contact the clinic through Zeolife[facility] or phone. If you have a critical or abnormal lab result, we will notify you by phone as soon as possible.  Submit refill requests through [facility] or call your pharmacy and they will forward the refill request to us. Please allow 3 business days for your refill to be completed.          Additional Information About Your Visit        MyChart Information     [facility] lets you send messages to your doctor, view your test results, renew your prescriptions, schedule appointments and more. To sign up, go to [email or web address] . Click on \"Log in\" on the left side of the screen, which will take you to the Welcome page. Then click on \"Sign up Now\" on the right side of the page.     You will be asked to enter the access code listed below, as well as some personal information. Please follow the directions to create your username and password.     Your access code is: [email or web address]  Expires: 2018  2:16 PM     Your access code will  in 90 days. If you need help or a new code, please call your [facility] [place] or [phone].        Care " "EveryWhere ID     This is your Care EveryWhere ID. This could be used by other organizations to access your [place] medical records  [id number]        Your Vitals Were     Pulse Temperature Respirations Height Pulse Oximetry BMI (Body Mass Index)    90 97.9  F (36.6  C) (Temporal) 20 5' 2.4\" (1.585 m) 100% 22.98 kg/m2       Blood Pressure from Last 3 Encounters:   05/09/18 122/80   10/13/17 126/80   03/08/17 130/80    Weight from Last 3 Encounters:   05/09/18 127 lb 4.8 oz (57.7 kg)   10/13/17 133 lb (60.3 kg)   03/30/17 144 lb (65.3 kg)              We Performed the Following     T4 free     TSH with free T4 reflex          Today's Medication Changes          These changes are accurate as of 5/9/18 11:59 PM.  If you have any questions, ask your nurse or doctor.               Start taking these medicines.        Dose/Directions    ALPRAZolam 0.5 MG tablet   Commonly known as:  XANAX   Used for:  Claustrophobia   Started by:  Narendra Peters MD        Take 1 tablet one hour before MRI and may repeat if needed before MRI.   Quantity:  2 tablet   Refills:  0       predniSONE 20 MG tablet   Commonly known as:  DELTASONE   Used for:  Lumbar back pain with radiculopathy affecting left lower extremity   Started by:  Narendra Peters MD        Take 3 tabs daily for 3 days, then 2 tabs daily for 3 days, then 1 tab daily for 3 days   Quantity:  18 tablet   Refills:  1            Where to get your medicines      These medications [facility] [place] [address] [address] [address] [facility]   [address] [facility] [address]     Phone:  [phone]     predniSONE 20 MG tablet         Some of these will need a paper prescription and others can be bought over the counter.  Ask your nurse if you have questions.     Bring a paper prescription for each of these medications     ALPRAZolam 0.5 MG tablet                Primary Care Provider Office Phone # Fax #    Narendra Peters [phone] " [phone]       [address] [facility] [address]        Equal Access to Services     REYMUNDO THOMAS : Hadii aad ku hadjjregina Nelson, wastevenda mauriliorenateha, qaybta kavestada jeraldnelinadine, dann marquislucillerocio coffey. So [facility] [phone].    ATENCIÓN: Si habla español, tiene a ferrari disposición servicios gratuitos de asistencia lingüística. Llame al [phone].    We comply with applicable federal civil rights laws and [place] laws. We do not discriminate on the basis of race, color, national origin, age, disability, sex, sexual orientation, or gender identity.            Thank you!     Thank you for choosing [facility]  for your care. Our goal is always to provide you with excellent care. Hearing back from our patients is one way we can continue to improve our services. Please take a few minutes to complete the written survey that you may receive in the mail after your visit with us. Thank you!             Your Updated Medication List - Protect others around you: Learn how to safely use, store and throw away your medicines at www.disposemymeds.org.          This list is accurate as of 5/9/18 11:59 PM.  Always use your most recent med list.                   Brand Name Dispense Instructions for use Diagnosis    ALPRAZolam 0.5 MG tablet    XANAX    2 tablet    Take 1 tablet one hour before MRI and may repeat if needed before MRI.    Claustrophobia       cyclobenzaprine 10 MG tablet    FLEXERIL    90 tablet    TAKE ONE TABLET BY MOUTH THREE TIMES A DAY AS NEEDED FOR MUSCLE SPASMS    Cervicalgia       gabapentin 300 MG capsule    NEURONTIN    90 capsule    TAKE ONE CAPSULE BY MOUTH THREE TIMES A DAY    Multiple sclerosis (H)       levothyroxine 137 MCG tablet    SYNTHROID/LEVOTHROID    30 tablet    TAKE ONE TABLET BY MOUTH EVERY DAY (THYROID LABS NEEDED FOR FURTHER REFILLS)    Hypothyroidism, unspecified type       MULTIVITAMIN ADULT PO      Take 1 tablet by mouth daily         predniSONE 20 MG tablet    DELTASONE    18 tablet    Take 3 tabs daily for 3 days, then 2 tabs daily for 3 days, then 1 tab daily for 3 days    Lumbar back pain with radiculopathy affecting left lower extremity       sertraline 50 MG tablet    ZOLOFT    30 tablet    TAKE ONE TABLET BY MOUTH EVERY DAY    Major depressive disorder, recurrent episode, mild (H)       traMADol 50 MG tablet    ULTRAM    120 tablet    Take 1 tablet (50 mg) by mouth every 6 hours as needed    Cervicalgia       TYLENOL 325 MG tablet   Generic drug:  acetaminophen     100 tablet    Take 2 tablets (650 mg) by mouth every 4 hours as needed    Gastrointestinal hemorrhage associated with duodenal ulcer

## 2018-05-09 NOTE — PROGRESS NOTES
SUBJECTIVE:   Jaki Gómez is a 57 year old female who presents to clinic today for the following health issues:    Patient is aware needs to do labs after visit.   Medication Followup of levothyroxine     Taking Medication as prescribed: yes    Side Effects:  None    Medication Helping Symptoms:  NO-not right know due to back pain       Back Pain       Duration: x 2 weeks extreme pain        Specific cause: painting    Description:   Location of pain: low back left  Character of pain: sharp, stabbing (in leg) and cramping  Pain radiation:radiates into the right leg  New numbness or weakness in legs, not attributed to pain:  no     Intensity: Currently 8/10    History:   Pain interferes with job: YES  History of back problems: Yes back injections  Any previous MRI or X-rays: Yes- at [facility].  Date Cervical 4/18/16 lumbar 7/26/16  Sees a specialist for back pain:  [facility]  Therapies tried without relief: everything patient can try    Alleviating factors:   Improved by: none      Precipitating factors:  Worsened by: Lifting, Sitting and Walking    Functional and Psychosocial Screen (Aubree STarT Back):      Not performed today          Accompanying Signs & Symptoms:  Risk of Fracture:  None  Risk of Cauda Equina:  None  Risk of Infection:  None  Risk of Cancer:  None  Risk of Ankylosing Spondylitis:  Onset at age <35, male, AND morning back stiffness.                  Problem list and histories reviewed & adjusted, as indicated.  Additional history: as documented        Reviewed and updated as needed this visit by clinical staff  Tobacco  Allergies  Meds  Med Hx  Surg Hx  Fam Hx  Soc Hx      Reviewed and updated as needed this visit by Provider        SUBJECTIVE:  Jaki  is a 57 year old female who presents for: Follow-up of her thyroid and severe left lower back pain with radiculopathy down her left leg.  We changed her Synthroid dose last fall and she is coming in for recheck on that.  In that regard she  has been feeling well.  But for the last couple of weeks she has been in increasing pain from her back.  She has been going to chiropractic.  She has known lumbar disc disease but has been not bothering her much and she has been suffering with cervalgia and is been seeing chiropractic for that.  She takes Flexeril and tramadol.  These do not seem to be helping as much now with sudden onset of her back pain with radiation down her left leg.  Almost the point of making her leg week.  No bowel or bladder symptoms.    Past Medical History:   Diagnosis Date     Lumbago      Multiple sclerosis (H)      Unspecified hypothyroidism     Hypothyroidism     Past Surgical History:   Procedure Laterality Date     ESOPHAGOSCOPY, GASTROSCOPY, DUODENOSCOPY (EGD), COMBINED N/A 2016    Procedure: COMBINED ESOPHAGOSCOPY, GASTROSCOPY, DUODENOSCOPY (EGD);  Surgeon: Walter Rodriguez MD;  Location:  GI     ESOPHAGOSCOPY, GASTROSCOPY, DUODENOSCOPY (EGD), COMBINED N/A 2/15/2016    Procedure: COMBINED ESOPHAGOSCOPY, GASTROSCOPY, DUODENOSCOPY (EGD);  Surgeon: Narendra Mendoza MD;  Location: [facility]     HC CLOSED TX TIBIAL SHAFT FX W/O MANIPULATION  2003    Closed reduction and long leg cast application of left tib-fib fx.     HC INJ EPIDURAL LUMBAR/SACRAL W/WO CONTRAST  2006    Steroid injection L4-5     HC TREATMENT MISSED  SURG, 1ST TRIMESTER      Suction dilatation and curettage     Social History   Substance Use Topics     Smoking status: Current Every Day Smoker     Packs/day: 0.50     Types: Cigarettes     Smokeless tobacco: Never Used     Alcohol use No     Current Outpatient Prescriptions   Medication Sig Dispense Refill     acetaminophen (TYLENOL) 325 MG tablet Take 2 tablets (650 mg) by mouth every 4 hours as needed 100 tablet      cyclobenzaprine (FLEXERIL) 10 MG tablet TAKE ONE TABLET BY MOUTH THREE TIMES A DAY AS NEEDED FOR MUSCLE SPASMS 90 tablet 3     gabapentin (NEURONTIN) 300 MG capsule TAKE ONE  "CAPSULE BY MOUTH THREE TIMES A DAY 90 capsule 11     levothyroxine (SYNTHROID/LEVOTHROID) 137 MCG tablet TAKE ONE TABLET BY MOUTH EVERY DAY (THYROID LABS NEEDED FOR FURTHER REFILLS) 30 tablet 0     Multiple Vitamins-Minerals (MULTIVITAMIN ADULT PO) Take 1 tablet by mouth daily       predniSONE (DELTASONE) 20 MG tablet Take 3 tabs daily for 3 days, then 2 tabs daily for 3 days, then 1 tab daily for 3 days 18 tablet 1     sertraline (ZOLOFT) 50 MG tablet TAKE ONE TABLET BY MOUTH EVERY DAY 30 tablet 1     traMADol (ULTRAM) 50 MG tablet Take 1 tablet (50 mg) by mouth every 6 hours as needed 120 tablet 0     [DISCONTINUED] levothyroxine (SYNTHROID/LEVOTHROID) 125 MCG tablet TAKE ONE TABLET BY MOUTH EVERY DAY 30 tablet 6       REVIEW OF SYSTEMS:   5 point ROS negative except as noted above in HPI, including Gen., Resp, CV, GI &  system review.     OBJECTIVE:  Vitals: /80  Pulse 90  Temp 97.9  F (36.6  C) (Temporal)  Resp 20  Ht 5' 2.4\" (1.585 m)  Wt 127 lb 4.8 oz (57.7 kg)  SpO2 100%  BMI 22.98 kg/m2  BMI= Body mass index is 22.98 kg/(m^2).  She is alert but fairly miserable appearing.  Heart rate is in the 80s.  No thyromegaly.  Skin clear.  Sitting somewhat leaning towards her right in the chair with her legs straightened out on the left.  Her spine is straight but there is some tenderness in the paraspinous muscles on the left lumbar spine area down to the sacroiliac joint.  Minimal flexion extension without pain.  Straight leg raising is positive on the left.    ASSESSMENT:  #1 acute onset lumbar back pain with left-sided radiculopathy #2 hypothyroidism    PLAN:  Put her on prednisone tapering from 60 mg down over 9 days.  Continue on tramadol Flexeril.  She will continue chiropractic.  She is getting set up with [facility] [place].  We will set her up for an MRI.  Follow-up if worsening or loss of bowel or bladder function.  Will notify of her thyroid results in calling the correct " dose.        Narendra Peters MD  [facility]

## 2018-05-10 ENCOUNTER — HOSPITAL ENCOUNTER (OUTPATIENT)
Dept: MRI IMAGING | Facility: CLINIC | Age: 58
Discharge: HOME | End: 2018-05-10
Attending: FAMILY MEDICINE | Admitting: FAMILY MEDICINE
Payer: COMMERCIAL

## 2018-05-10 DIAGNOSIS — M54.16 LUMBAR BACK PAIN WITH RADICULOPATHY AFFECTING LEFT LOWER EXTREMITY: ICD-10-CM

## 2018-05-10 PROCEDURE — 72148 MRI LUMBAR SPINE W/O DYE: CPT

## 2018-05-11 ENCOUNTER — TELEPHONE (OUTPATIENT)
Dept: FAMILY MEDICINE | Facility: CLINIC | Age: 58
End: 2018-05-11

## 2018-05-11 NOTE — PROGRESS NOTES
MRI of the lumbar spine shows no new findings since the one of 2016.  Disc herniation or significant stenosis

## 2018-05-11 NOTE — TELEPHONE ENCOUNTER
Patient returned your call, message below per Dr Peters was relayed to patient, patient also inquiring about her labwork, please advise.  Thank you,  Antonieta Araujo  Patient Representative

## 2018-05-11 NOTE — TELEPHONE ENCOUNTER
----- Message from Narendra Peters MD sent at 5/11/2018 10:59 AM CDT -----  MRI of the lumbar spine shows no new findings since the one of 2016.  Disc herniation or significant stenosis

## 2018-05-15 DIAGNOSIS — Z13.6 CARDIOVASCULAR SCREENING; LDL GOAL LESS THAN 160: Primary | ICD-10-CM

## 2018-05-15 DIAGNOSIS — E03.9 HYPOTHYROIDISM, UNSPECIFIED TYPE: ICD-10-CM

## 2018-05-15 NOTE — TELEPHONE ENCOUNTER
----- Message from Narendra Peters MD sent at 5/15/2018 12:57 PM CDT -----  Looks like the Synthroid dose needs to be lower now.  I believe she is on 137.  Needs to go down to 125 mcg a day.  Recheck thyroid test in 2 month

## 2018-05-15 NOTE — PROGRESS NOTES
Looks like the Synthroid dose needs to be lower now.  I believe she is on 137.  Needs to go down to 125 mcg a day.  Recheck thyroid test in 2 month

## 2018-05-16 RX ORDER — LEVOTHYROXINE SODIUM 125 UG/1
125 TABLET ORAL DAILY
Qty: 90 TABLET | Refills: 1 | Status: SHIPPED | OUTPATIENT
Start: 2018-05-16 | End: 2019-01-31

## 2018-05-16 NOTE — TELEPHONE ENCOUNTER
Jaki returns call and message is relayed.  Jaki states understanding and would like the new prescription sent to  [facility].

## 2018-05-29 DIAGNOSIS — M54.2 CERVICALGIA: ICD-10-CM

## 2018-05-29 NOTE — TELEPHONE ENCOUNTER
Tramadol 50 MG       Last Written Prescription Date:  4/30/18  Last Fill Quantity: 120,   # refills: 0  Last Office Visit: 5/9/18  Future Office visit:       Routing refill request to provider for review/approval because:  Drug not on the [facility], [facility] or [facility] refill protocol or controlled substance

## 2018-05-30 RX ORDER — TRAMADOL HYDROCHLORIDE 50 MG/1
50 TABLET ORAL EVERY 6 HOURS PRN
Qty: 120 TABLET | Refills: 0 | Status: SHIPPED | OUTPATIENT
Start: 2018-05-30 | End: 2018-06-25

## 2018-06-25 DIAGNOSIS — M54.2 CERVICALGIA: ICD-10-CM

## 2018-06-25 RX ORDER — TRAMADOL HYDROCHLORIDE 50 MG/1
50 TABLET ORAL EVERY 6 HOURS PRN
Qty: 120 TABLET | Refills: 0 | Status: SHIPPED | OUTPATIENT
Start: 2018-06-25 | End: 2018-07-23

## 2018-06-25 NOTE — TELEPHONE ENCOUNTER
Requested Prescriptions   Pending Prescriptions Disp Refills     traMADol (ULTRAM) 50 MG tablet 120 tablet 0     Sig: Take 1 tablet (50 mg) by mouth every 6 hours as needed    There is no refill protocol information for this order              Last Written Prescription Date:  5/30/18  Last Fill Quantity: 120,   # refills: 0  Last Office Visit: 5/9/18  Future Office visit:       Routing refill request to provider for review/approval because:  Drug not on the [facility] or [facility] refill protocol or controlled substance

## 2018-06-26 NOTE — TELEPHONE ENCOUNTER
Signed original RX delivered to [facility]. Sandra,

## 2018-06-27 ENCOUNTER — THERAPY VISIT (OUTPATIENT)
Dept: CHIROPRACTIC MEDICINE | Facility: CLINIC | Age: 58
End: 2018-06-27
Payer: COMMERCIAL

## 2018-06-27 DIAGNOSIS — M99.04 SEGMENTAL DYSFUNCTION OF SACRAL REGION: Primary | ICD-10-CM

## 2018-06-27 DIAGNOSIS — M54.2 CERVICALGIA: ICD-10-CM

## 2018-06-27 DIAGNOSIS — E03.9 HYPOTHYROIDISM, UNSPECIFIED TYPE: ICD-10-CM

## 2018-06-27 DIAGNOSIS — M99.02 SEGMENTAL DYSFUNCTION OF THORACIC REGION: ICD-10-CM

## 2018-06-27 DIAGNOSIS — M54.12 BRACHIAL NEURITIS OR RADICULITIS: ICD-10-CM

## 2018-06-27 DIAGNOSIS — M99.01 SEGMENTAL DYSFUNCTION OF CERVICAL REGION: ICD-10-CM

## 2018-06-27 DIAGNOSIS — M99.03 SEGMENTAL DYSFUNCTION OF LUMBAR REGION: ICD-10-CM

## 2018-06-27 LAB
T4 FREE SERPL-MCNC: 1.21 NG/DL (ref 0.76–1.46)
TSH SERPL DL<=0.005 MIU/L-ACNC: 0.1 MU/L (ref 0.4–4)

## 2018-06-27 PROCEDURE — 84443 ASSAY THYROID STIM HORMONE: CPT | Performed by: FAMILY MEDICINE

## 2018-06-27 PROCEDURE — 36415 COLL VENOUS BLD VENIPUNCTURE: CPT | Performed by: FAMILY MEDICINE

## 2018-06-27 PROCEDURE — 98941 CHIROPRACT MANJ 3-4 REGIONS: CPT | Mod: AT | Performed by: CHIROPRACTOR

## 2018-06-27 PROCEDURE — 84439 ASSAY OF FREE THYROXINE: CPT | Performed by: FAMILY MEDICINE

## 2018-06-27 NOTE — PROGRESS NOTES
"5/18/2018 Updated Lumbar MRI - IMPRESSION: I see no definite change since an MRI on 7/26/2016. There  are mild degenerative changes in the lower lumbar spine resulting in  mild central canal stenosis at L4-L5. No disc herniation or other site  of stenosis is seen.    Visit #:  1 of 12    Subjective:  Jaki Gómez is a 55 year old female who is seen in f/u up for:        Segmental dysfunction of cervical region  Segmental dysfunction of lumbar region  Brachial neuritis or radiculitis  Cervicalgia  Segmental dysfunction of thoracic region.     Since last visit on 5/9/2018,  Jaki Gómez reports: that she had an updated lumbar MRI and there is \"nothing there.\" She has been exercising quite a bit, and is feeling much better. She continues to have pain in her left lower back. Rated 6/10 and her neck continues to give her issues, rated 7/10. Dr. Peters recommended that she get further evaluation of her neck, but she thinks her lower back is the problem. She feels like we are able to keep her neck pain feeling good.     ADL's : ADLs have been difficult with exacerbation of pain.          Objective:  The following was observed:    P: pain elicited on palpation, across lower back  A: static palpation demonstrates intersegmental asymmetry, as noted  R: motion palpation notes restricted motion  T: hypertonicity at:  Lumbar Paraspinals bilaterally      Segmental spinal dysfunction/restrictions found at:  C2 LR, RRR  C5 RR, LRR  T1 RR, LRR  T5 E, FR   T10 E, FR  L4 LR, RRR  Left SI joint posterior      Assessment:    Diagnoses:      1. Segmental dysfunction of cervical region    2. Segmental dysfunction of lumbar region    3. Brachial neuritis or radiculitis    4. Cervicalgia    5. Segmental dysfunction of thoracic region        Patient's condition:  Patient had restrictions pre-manipulation and Patient had decreased motion prior to manipulation    Treatment effectiveness:  Post manipulation there is better intersegmental " movement and Patient claims to feel looser post manipulation      Procedures:  CMT:  48123 Chiropractic manipulative treatment 3-4 regions performed   Cervical: Deiversified, C2, C5, Supine  Thoracic: Diversified, T1, T5, T10, Prone   Lumbar: Drop assist, Flexion Distraction, L4, Prone  Pelvis: Drop Assist, Left SI joint, Prone      Modalities:  MSTM to affected musculature      Therapeutic procedures:  Gave ice instructions post-adjustment, as needed.      Prognosis: Good    Progress towards Goals: Patient has been improving with care but had a recent flare-up of pain with flinging birdseed.    Recommendations:    Instructions:ice 20 minutes every other hour as needed and stretch as instructed at visit     Follow-up:  Return to care after camping.

## 2018-06-27 NOTE — MR AVS SNAPSHOT
After Visit Summary   6/27/2018    Jaki Gómez    MRN: [id number]           Patient Information     Date Of Birth          1960        Visit Information        Provider Department      6/27/2018 1:00 PM Sharri Nicolas, [facility]        Today's Diagnoses     Segmental dysfunction of sacral region    -  1    Segmental dysfunction of cervical region        Segmental dysfunction of lumbar region        Brachial neuritis or radiculitis        Cervicalgia        Segmental dysfunction of thoracic region           Follow-ups after your visit        Your next 10 appointments already scheduled     Jun 27, 2018  1:30 PM CDT   LAB with [facility]   [facility]    [address] [place] [id number] [phone]           Please do not eat 10-12 hours before your appointment if you are coming in fasting for labs on lipids, cholesterol, or glucose (sugar). This does not apply to pregnant women. Water, hot tea and black coffee (with nothing added) are okay. Do not drink other fluids, diet soda or chew gum.              Who to contact     If you have questions or need follow up information about today's clinic visit or your schedule please contact [facility] directly at [phone].  Normal or non-critical lab and imaging results will be communicated to you by MyChart, letter or phone within 4 business days after the clinic has received the results. If you do not hear from us within 7 days, please contact the clinic through MyChart or phone. If you have a critical or abnormal lab result, we will notify you by phone as soon as possible.  Submit refill requests through [facility] or call your pharmacy and they will forward the refill request to us. Please allow 3 business days for your refill to be completed.          Additional Information About Your Visit        Care EveryWhere ID     This is your Care  EveryWhere ID. This could be used by other organizations to access your [place] medical records  [id number]         Blood Pressure from Last 3 Encounters:   05/09/18 122/80   10/13/17 126/80   03/08/17 130/80    Weight from Last 3 Encounters:   05/09/18 57.7 kg (127 lb 4.8 oz)   10/13/17 60.3 kg (133 lb)   03/30/17 65.3 kg (144 lb)              We Performed the Following     CHIROPRAC MANIP,SPINAL,3-4 REGIONS        Primary Care Provider Office Phone # Fax #    Narendra Peters [phone]       [address] [facility] [address]        Equal Access to Services     REYMUNDO THOMAS : Hadii delmy alano Omar, waaxda luqadaha, qaybta kaalmada jeraldyanadine, dann hammond . So [facility] [phone].    ATENCIÓN: Si habla español, tiene a ferrari disposición servicios gratuitos de asistencia lingüística. [facility] [phone].    We comply with applicable federal civil rights laws and [place] laws. We do not discriminate on the basis of race, color, national origin, age, disability, sex, sexual orientation, or gender identity.            Thank you!     Thank you for choosing [facility]  for your care. Our goal is always to provide you with excellent care. Hearing back from our patients is one way we can continue to improve our services. Please take a few minutes to complete the written survey that you may receive in the mail after your visit with us. Thank you!             Your Updated Medication List - Protect others around you: Learn how to safely use, store and throw away your medicines at www.disposemymeds.org.          This list is accurate as of 6/27/18  1:12 PM.  Always use your most recent med list.                   Brand Name Dispense Instructions for use Diagnosis    ALPRAZolam 0.5 MG tablet    XANAX    2 tablet    Take 1 tablet one hour before MRI and may repeat if needed before MRI.    Claustrophobia       cyclobenzaprine 10 MG tablet     FLEXERIL    90 tablet    TAKE ONE TABLET BY MOUTH THREE TIMES A DAY AS NEEDED FOR MUSCLE SPASMS    Cervicalgia       gabapentin 300 MG capsule    NEURONTIN    90 capsule    TAKE ONE CAPSULE BY MOUTH THREE TIMES A DAY    Multiple sclerosis (H)       levothyroxine 125 MCG tablet    SYNTHROID/LEVOTHROID    90 tablet    Take 1 tablet (125 mcg) by mouth daily    CARDIOVASCULAR SCREENING; LDL GOAL LESS THAN 160       MULTIVITAMIN ADULT PO      Take 1 tablet by mouth daily        predniSONE 20 MG tablet    DELTASONE    18 tablet    Take 3 tabs daily for 3 days, then 2 tabs daily for 3 days, then 1 tab daily for 3 days    Lumbar back pain with radiculopathy affecting left lower extremity       sertraline 50 MG tablet    ZOLOFT    30 tablet    TAKE ONE TABLET BY MOUTH EVERY DAY    Major depressive disorder, recurrent episode, mild (H)       traMADol 50 MG tablet    ULTRAM    120 tablet    Take 1 tablet (50 mg) by mouth every 6 hours as needed    Cervicalgia       TYLENOL 325 MG tablet   Generic drug:  acetaminophen     100 tablet    Take 2 tablets (650 mg) by mouth every 4 hours as needed    Gastrointestinal hemorrhage associated with duodenal ulcer

## 2018-06-28 ENCOUNTER — TELEPHONE (OUTPATIENT)
Dept: FAMILY MEDICINE | Facility: CLINIC | Age: 58
End: 2018-06-28

## 2018-06-28 DIAGNOSIS — E03.9 HYPOTHYROIDISM, UNSPECIFIED TYPE: Primary | ICD-10-CM

## 2018-06-28 RX ORDER — LEVOTHYROXINE SODIUM 112 UG/1
112 TABLET ORAL DAILY
Qty: 60 TABLET | Refills: 0 | COMMUNITY
Start: 2018-06-28 | End: 2019-01-31

## 2018-06-28 NOTE — TELEPHONE ENCOUNTER
----- Message from Narendra Peters MD sent at 6/28/2018  1:22 PM CDT -----  Thyroid test still shows too high of a dose.  We should roll back to 112 mcg a day.  Send him 60 tablets and we should recheck a TSH in 2 months before she runs out

## 2018-06-28 NOTE — PROGRESS NOTES
Thyroid test still shows too high of a dose.  We should roll back to 112 mcg a day.  Send him 60 tablets and we should recheck a TSH in 2 months before she runs out

## 2018-06-28 NOTE — TELEPHONE ENCOUNTER
Pt advised on MD notes as written and states understanding. Rx called into [facility] pharmacy. See future order for TSH.  Shaila Solares, CMA

## 2018-07-18 ENCOUNTER — THERAPY VISIT (OUTPATIENT)
Dept: CHIROPRACTIC MEDICINE | Facility: CLINIC | Age: 58
End: 2018-07-18
Payer: COMMERCIAL

## 2018-07-18 DIAGNOSIS — M99.03 SEGMENTAL DYSFUNCTION OF LUMBAR REGION: ICD-10-CM

## 2018-07-18 DIAGNOSIS — M54.2 CERVICALGIA: ICD-10-CM

## 2018-07-18 DIAGNOSIS — M99.01 SEGMENTAL DYSFUNCTION OF CERVICAL REGION: ICD-10-CM

## 2018-07-18 DIAGNOSIS — M54.12 BRACHIAL NEURITIS OR RADICULITIS: ICD-10-CM

## 2018-07-18 DIAGNOSIS — M99.04 SEGMENTAL DYSFUNCTION OF SACRAL REGION: Primary | ICD-10-CM

## 2018-07-18 DIAGNOSIS — M99.02 SEGMENTAL DYSFUNCTION OF THORACIC REGION: ICD-10-CM

## 2018-07-18 PROCEDURE — 98941 CHIROPRACT MANJ 3-4 REGIONS: CPT | Mod: AT | Performed by: CHIROPRACTOR

## 2018-07-18 NOTE — MR AVS SNAPSHOT
After Visit Summary   7/18/2018    Jaki Gómez    MRN: [id number]           Patient Information     Date Of Birth          1960        Visit Information        Provider Department      7/18/2018 2:00 PM Sharri Nicolas, [facility]        Today's Diagnoses     Segmental dysfunction of sacral region    -  1    Segmental dysfunction of cervical region        Segmental dysfunction of lumbar region        Brachial neuritis or radiculitis        Cervicalgia        Segmental dysfunction of thoracic region           Follow-ups after your visit        Who to contact     If you have questions or need follow up information about today's clinic visit or your schedule please contact [facility] directly at [phone].  Normal or non-critical lab and imaging results will be communicated to you by MyChart, letter or phone within 4 business days after the clinic has received the results. If you do not hear from us within 7 days, please contact the clinic through MyChart or phone. If you have a critical or abnormal lab result, we will notify you by phone as soon as possible.  Submit refill requests through [employer] or call your pharmacy and they will forward the refill request to us. Please allow 3 business days for your refill to be completed.          Additional Information About Your Visit        Care EveryWhere ID     This is your Care EveryWhere ID. This could be used by other organizations to access your [facility] medical records  [id number]         Blood Pressure from Last 3 Encounters:   05/09/18 122/80   10/13/17 126/80   03/08/17 130/80    Weight from Last 3 Encounters:   05/09/18 57.7 kg (127 lb 4.8 oz)   10/13/17 60.3 kg (133 lb)   03/30/17 65.3 kg (144 lb)              We Performed the Following     CHIROPRAC MANIP,SPINAL,3-4 REGIONS        Primary Care Provider Office Phone # Fax #    Narendra Peters [phone]  [facility] [address]        Equal Access to Services     NIYAMICHAEL WILLIAM : Hadii aad ku hadjjregina Nelson, wamanju jerez, qamatlidaandrew momanadine navarro, dann marquislucillerocio coffey. So [facility] [phone].    ATENCIÓN: Si habla español, tiene a ferrari disposición servicios gratuitos de asistencia lingüística. [facility] [phone].    We comply with applicable federal civil rights laws and [place] laws. We do not discriminate on the basis of race, color, national origin, age, disability, sex, sexual orientation, or gender identity.            Thank you!     Thank you for choosing [facility]  for your care. Our goal is always to provide you with excellent care. Hearing back from our patients is one way we can continue to improve our services. Please take a few minutes to complete the written survey that you may receive in the mail after your visit with us. Thank you!             Your Updated Medication List - Protect others around you: Learn how to safely use, store and throw away your medicines at www.disposemymeds.org.          This list is accurate as of 7/18/18  2:03 PM.  Always use your most recent med list.                   Brand Name Dispense Instructions for use Diagnosis    ALPRAZolam 0.5 MG tablet    XANAX    2 tablet    Take 1 tablet one hour before MRI and may repeat if needed before MRI.    Claustrophobia       cyclobenzaprine 10 MG tablet    FLEXERIL    90 tablet    TAKE ONE TABLET BY MOUTH THREE TIMES A DAY AS NEEDED FOR MUSCLE SPASMS    Cervicalgia       gabapentin 300 MG capsule    NEURONTIN    90 capsule    TAKE ONE CAPSULE BY MOUTH THREE TIMES A DAY    Multiple sclerosis (H)       * levothyroxine 125 MCG tablet    SYNTHROID/LEVOTHROID    90 tablet    Take 1 tablet (125 mcg) by mouth daily    CARDIOVASCULAR SCREENING; LDL GOAL LESS THAN 160       * levothyroxine 112 MCG tablet    SYNTHROID/LEVOTHROID    60 tablet    Take 1 tablet (112 mcg) by mouth  daily    Hypothyroidism, unspecified type       MULTIVITAMIN ADULT PO      Take 1 tablet by mouth daily        predniSONE 20 MG tablet    DELTASONE    18 tablet    Take 3 tabs daily for 3 days, then 2 tabs daily for 3 days, then 1 tab daily for 3 days    Lumbar back pain with radiculopathy affecting left lower extremity       sertraline 50 MG tablet    ZOLOFT    30 tablet    TAKE ONE TABLET BY MOUTH EVERY DAY    Major depressive disorder, recurrent episode, mild (H)       traMADol 50 MG tablet    ULTRAM    120 tablet    Take 1 tablet (50 mg) by mouth every 6 hours as needed    Cervicalgia       TYLENOL 325 MG tablet   Generic drug:  acetaminophen     100 tablet    Take 2 tablets (650 mg) by mouth every 4 hours as needed    Gastrointestinal hemorrhage associated with duodenal ulcer       * Notice:  This list has 2 medication(s) that are the same as other medications prescribed for you. Read the directions carefully, and ask your doctor or other care provider to review them with you.

## 2018-07-23 DIAGNOSIS — M54.2 CERVICALGIA: ICD-10-CM

## 2018-07-24 NOTE — TELEPHONE ENCOUNTER
Requested Prescriptions   Pending Prescriptions Disp Refills     traMADol (ULTRAM) 50 MG tablet 120 tablet 0     Sig: Take 1 tablet (50 mg) by mouth every 6 hours as needed    There is no refill protocol information for this order              Last Written Prescription Date:  6/25/18  Last Fill Quantity: 120,   # refills: 0  Last Office Visit: 5/9/18  Future Office visit:       Routing refill request to provider for review/approval because:  Drug not on the [facility], [facility] or [facility] refill protocol or controlled substance

## 2018-07-25 RX ORDER — TRAMADOL HYDROCHLORIDE 50 MG/1
50 TABLET ORAL EVERY 6 HOURS PRN
Qty: 120 TABLET | Refills: 0 | Status: SHIPPED | OUTPATIENT
Start: 2018-07-25 | End: 2018-08-20

## 2018-07-27 ENCOUNTER — HEALTH MAINTENANCE LETTER (OUTPATIENT)
Age: 58
End: 2018-07-27

## 2018-08-10 ENCOUNTER — THERAPY VISIT (OUTPATIENT)
Dept: CHIROPRACTIC MEDICINE | Facility: CLINIC | Age: 58
End: 2018-08-10
Payer: COMMERCIAL

## 2018-08-10 DIAGNOSIS — M99.01 SEGMENTAL DYSFUNCTION OF CERVICAL REGION: ICD-10-CM

## 2018-08-10 DIAGNOSIS — M99.02 SEGMENTAL DYSFUNCTION OF THORACIC REGION: ICD-10-CM

## 2018-08-10 DIAGNOSIS — M54.2 CERVICALGIA: ICD-10-CM

## 2018-08-10 DIAGNOSIS — E03.9 HYPOTHYROIDISM, UNSPECIFIED TYPE: ICD-10-CM

## 2018-08-10 DIAGNOSIS — M54.12 BRACHIAL NEURITIS OR RADICULITIS: ICD-10-CM

## 2018-08-10 DIAGNOSIS — M99.03 SEGMENTAL DYSFUNCTION OF LUMBAR REGION: ICD-10-CM

## 2018-08-10 LAB
T4 FREE SERPL-MCNC: 0.93 NG/DL (ref 0.76–1.46)
TSH SERPL DL<=0.005 MIU/L-ACNC: 0.02 MU/L (ref 0.4–4)

## 2018-08-10 PROCEDURE — 36415 COLL VENOUS BLD VENIPUNCTURE: CPT | Performed by: FAMILY MEDICINE

## 2018-08-10 PROCEDURE — 84443 ASSAY THYROID STIM HORMONE: CPT | Performed by: FAMILY MEDICINE

## 2018-08-10 PROCEDURE — 98941 CHIROPRACT MANJ 3-4 REGIONS: CPT | Mod: AT | Performed by: CHIROPRACTOR

## 2018-08-10 PROCEDURE — 84439 ASSAY OF FREE THYROXINE: CPT | Performed by: FAMILY MEDICINE

## 2018-08-10 NOTE — MR AVS SNAPSHOT
After Visit Summary   8/10/2018    Jaki Gómez    MRN: [id number]           Patient Information     Date Of Birth          1960        Visit Information        Provider Department      8/10/2018 1:00 PM Sharri Nicolas, [facility]        Today's Diagnoses     Segmental dysfunction of cervical region        Segmental dysfunction of lumbar region        Brachial neuritis or radiculitis        Cervicalgia        Segmental dysfunction of thoracic region           Follow-ups after your visit        Your next 10 appointments already scheduled     Aug 10, 2018  1:30 PM CDT   LAB with NL LAB [facility]   [facility]    [address] [place] [id number] [phone]           Please do not eat 10-12 hours before your appointment if you are coming in fasting for labs on lipids, cholesterol, or glucose (sugar). This does not apply to pregnant women. Water, hot tea and black coffee (with nothing added) are okay. Do not drink other fluids, diet soda or chew gum.              Who to contact     If you have questions or need follow up information about today's clinic visit or your schedule please contact [facility] directly at [phone].  Normal or non-critical lab and imaging results will be communicated to you by MyChart, letter or phone within 4 business days after the clinic has received the results. If you do not hear from us within 7 days, please contact the clinic through MyChart or phone. If you have a critical or abnormal lab result, we will notify you by phone as soon as possible.  Submit refill requests through [employer] or call your pharmacy and they will forward the refill request to us. Please allow 3 business days for your refill to be completed.          Additional Information About Your Visit        Care EveryWhere ID     This is your Care EveryWhere ID. This could be used by other  organizations to access your [facility] medical records  [id number]         Blood Pressure from Last 3 Encounters:   05/09/18 122/80   10/13/17 126/80   03/08/17 130/80    Weight from Last 3 Encounters:   05/09/18 57.7 kg (127 lb 4.8 oz)   10/13/17 60.3 kg (133 lb)   03/30/17 65.3 kg (144 lb)              We Performed the Following     CHIROPRAC MANIP,SPINAL,3-4 REGIONS        Primary Care Provider Office Phone # Fax #    Narendra Peters [phone]        [facility] [phone]        Equal Access to Services     REYMUNDO THOMAS : Hadii delmy Nelson, waaxda maurilioadaha, qaybta kaalmada melanie, dann coffey. So [facility] [phone].    ATENCIÓN: Si habla español, tiene a ferrari disposición servicios gratuitos de asistencia lingüística. Llame al [phone].    We comply with applicable federal civil rights laws and [place] laws. We do not discriminate on the basis of race, color, national origin, age, disability, sex, sexual orientation, or gender identity.            Thank you!     Thank you for choosing [facility]  for your care. Our goal is always to provide you with excellent care. Hearing back from our patients is one way we can continue to improve our services. Please take a few minutes to complete the written survey that you may receive in the mail after your visit with us. Thank you!             Your Updated Medication List - Protect others around you: Learn how to safely use, store and throw away your medicines at www.disposemymeds.org.          This list is accurate as of 8/10/18  1:08 PM.  Always use your most recent med list.                   Brand Name Dispense Instructions for use Diagnosis    ALPRAZolam 0.5 MG tablet    XANAX    2 tablet    Take 1 tablet one hour before MRI and may repeat if needed before MRI.    Claustrophobia       cyclobenzaprine 10 MG tablet    FLEXERIL    90 tablet    TAKE ONE  TABLET BY MOUTH THREE TIMES A DAY AS NEEDED FOR MUSCLE SPASMS    Cervicalgia       gabapentin 300 MG capsule    NEURONTIN    90 capsule    TAKE ONE CAPSULE BY MOUTH THREE TIMES A DAY    Multiple sclerosis (H)       * levothyroxine 125 MCG tablet    SYNTHROID/LEVOTHROID    90 tablet    Take 1 tablet (125 mcg) by mouth daily    CARDIOVASCULAR SCREENING; LDL GOAL LESS THAN 160       * levothyroxine 112 MCG tablet    SYNTHROID/LEVOTHROID    60 tablet    Take 1 tablet (112 mcg) by mouth daily    Hypothyroidism, unspecified type       MULTIVITAMIN ADULT PO      Take 1 tablet by mouth daily        predniSONE 20 MG tablet    DELTASONE    18 tablet    Take 3 tabs daily for 3 days, then 2 tabs daily for 3 days, then 1 tab daily for 3 days    Lumbar back pain with radiculopathy affecting left lower extremity       sertraline 50 MG tablet    ZOLOFT    30 tablet    TAKE ONE TABLET BY MOUTH EVERY DAY    Major depressive disorder, recurrent episode, mild (H)       traMADol 50 MG tablet    ULTRAM    120 tablet    Take 1 tablet (50 mg) by mouth every 6 hours as needed    Cervicalgia       TYLENOL 325 MG tablet   Generic drug:  acetaminophen     100 tablet    Take 2 tablets (650 mg) by mouth every 4 hours as needed    Gastrointestinal hemorrhage associated with duodenal ulcer       * Notice:  This list has 2 medication(s) that are the same as other medications prescribed for you. Read the directions carefully, and ask your doctor or other care provider to review them with you.

## 2018-08-10 NOTE — PROGRESS NOTES
"5/18/2018 Updated Lumbar MRI - IMPRESSION: I see no definite change since an MRI on 7/26/2016. There  are mild degenerative changes in the lower lumbar spine resulting in  mild central canal stenosis at L4-L5. No disc herniation or other site  of stenosis is seen.    Visit #:  3 of 12    Subjective:  Jaki Gómez is a 55 year old female who is seen in f/u up for:        Segmental dysfunction of cervical region  Segmental dysfunction of lumbar region  Brachial neuritis or radiculitis  Cervicalgia  Segmental dysfunction of thoracic region.     Since last visit on 7/18/2018,  Jaki Gómez reports: that she has been okay, but she has a really sore neck today. There was no known cause, and it has just worsened. She rates her current pain 7/10. It is difficult to turn to the right, and it feels hard to \"hold her head up.\"    ADL's : ADLs have been difficult with exacerbation of pain.          Objective:  The following was observed:    P: pain elicited on palpation, bilateral upper back   A: static palpation demonstrates intersegmental asymmetry, as noted  R: motion palpation notes restricted motion  T: hypertonicity at:  traps bilaterally      Segmental spinal dysfunction/restrictions found at:  C2 LR, RRR  C5 RR, LRR  T1 RR, LRR  T3 LR, RRR  T7 E, FR   L4 RR, LRR  Left SI joint posterior      Assessment:    Diagnoses:      1. Segmental dysfunction of cervical region    2. Segmental dysfunction of lumbar region    3. Brachial neuritis or radiculitis    4. Cervicalgia    5. Segmental dysfunction of thoracic region        Patient's condition:  Patient had restrictions pre-manipulation and Patient had decreased motion prior to manipulation    Treatment effectiveness:  Post manipulation there is better intersegmental movement and Patient claims to feel looser post manipulation      Procedures:  CMT:  53421 Chiropractic manipulative treatment 3-4 regions performed   Cervical: Deiversified, C2, C5, Supine  Thoracic: " Diversified, T1, T3, T7, Prone   Lumbar: Drop assist, Flexion Distraction, L4, Prone  Pelvis: Drop Assist, Left SI joint, Prone      Modalities:  MSTM to affected musculature      Therapeutic procedures:  Gave ice instructions post-adjustment, as needed.      Prognosis: Good    Progress towards Goals: Patient has been improving with care but had a recent flare-up of pain with flinging birdseed.    Recommendations:    Instructions:ice 20 minutes every other hour as needed and stretch as instructed at visit     Follow-up:  Return to care as needed.

## 2018-08-17 DIAGNOSIS — E03.9 HYPOTHYROIDISM, UNSPECIFIED TYPE: Primary | ICD-10-CM

## 2018-08-17 NOTE — PROGRESS NOTES
Thyroid tests still indicate overmedication.  We will decrease Synthroid to 100 mcg a day.  Recheck TSH in 2 months.

## 2018-08-17 NOTE — TELEPHONE ENCOUNTER
Called patient and left a voicemail to call the clinic back, when she calls back please relay the message below.     Ang[facility]          Message  Received: Today       Narendra Peters MD [facility]                   Thyroid tests still indicate overmedication.  We will decrease Synthroid to 100 mcg a day.  Recheck TSH in 2 months.

## 2018-08-20 DIAGNOSIS — M54.2 CERVICALGIA: ICD-10-CM

## 2018-08-20 RX ORDER — TRAMADOL HYDROCHLORIDE 50 MG/1
50 TABLET ORAL EVERY 6 HOURS PRN
Qty: 120 TABLET | Refills: 0 | Status: SHIPPED | OUTPATIENT
Start: 2018-08-20 | End: 2018-09-16

## 2018-08-20 RX ORDER — LEVOTHYROXINE SODIUM 100 UG/1
100 TABLET ORAL DAILY
Qty: 90 TABLET | Refills: 1 | Status: SHIPPED | OUTPATIENT
Start: 2018-08-20 | End: 2019-04-18

## 2018-08-20 NOTE — TELEPHONE ENCOUNTER
Patient called back and was given the message.  She uses the [facility].    Julia WILKS

## 2018-08-20 NOTE — TELEPHONE ENCOUNTER
Second attempt to reach pt. Left another message. Please relay results to pt when calls back and find out which pharmacy she uses.

## 2018-08-20 NOTE — TELEPHONE ENCOUNTER
Tramadol       Last Written Prescription Date:  7/25/18  Last Fill Quantity: 120,   # refills: 0  Last Office Visit: 5/9/18  Future Office visit:       Routing refill request to provider for review/approval because:  Drug not on the [facility], [facility] or [facility] refill protocol or controlled substance

## 2018-09-16 DIAGNOSIS — M54.2 CERVICALGIA: ICD-10-CM

## 2018-09-18 NOTE — TELEPHONE ENCOUNTER
Last Written Prescription Date:  8/20/18  Last Fill Quantity: 120,  # refills: 0   Last office visit: 5/9/2018 with prescribing provider:  5/9/18   Future Office Visit:    Requested Prescriptions   Pending Prescriptions Disp Refills     traMADol (ULTRAM) 50 MG tablet 120 tablet 0     Sig: Take 1 tablet (50 mg) by mouth every 6 hours as needed    There is no refill protocol information for this order        Routing refill request to provider for review/approval because:  Drug not on the [facility] refill protocol       Elissa Christopher RN. . .  9/18/2018, 10:11 AM

## 2018-09-19 RX ORDER — TRAMADOL HYDROCHLORIDE 50 MG/1
50 TABLET ORAL EVERY 6 HOURS PRN
Qty: 120 TABLET | Refills: 0 | Status: SHIPPED | OUTPATIENT
Start: 2018-09-19 | End: 2018-10-15

## 2018-09-21 ENCOUNTER — THERAPY VISIT (OUTPATIENT)
Dept: CHIROPRACTIC MEDICINE | Facility: CLINIC | Age: 58
End: 2018-09-21
Payer: COMMERCIAL

## 2018-09-21 DIAGNOSIS — M54.2 CERVICALGIA: ICD-10-CM

## 2018-09-21 DIAGNOSIS — M99.02 SEGMENTAL DYSFUNCTION OF THORACIC REGION: ICD-10-CM

## 2018-09-21 DIAGNOSIS — M99.01 SEGMENTAL DYSFUNCTION OF CERVICAL REGION: ICD-10-CM

## 2018-09-21 DIAGNOSIS — M54.12 BRACHIAL NEURITIS OR RADICULITIS: ICD-10-CM

## 2018-09-21 DIAGNOSIS — M99.03 SEGMENTAL DYSFUNCTION OF LUMBAR REGION: ICD-10-CM

## 2018-09-21 PROCEDURE — 98941 CHIROPRACT MANJ 3-4 REGIONS: CPT | Mod: AT | Performed by: CHIROPRACTOR

## 2018-09-21 NOTE — MR AVS SNAPSHOT
After Visit Summary   9/21/2018    Jaki Gómez    MRN: [id number]           Patient Information     Date Of Birth          1960        Visit Information        Provider Department      9/21/2018 1:45 PM Sharri Nicolas, [facility]        Today's Diagnoses     Segmental dysfunction of cervical region        Segmental dysfunction of lumbar region        Brachial neuritis or radiculitis        Cervicalgia        Segmental dysfunction of thoracic region           Follow-ups after your visit        Who to contact     If you have questions or need follow up information about today's clinic visit or your schedule please contact [facility] directly at [phone].  Normal or non-critical lab and imaging results will be communicated to you by MyChart, letter or phone within 4 business days after the clinic has received the results. If you do not hear from us within 7 days, please contact the clinic through MyChart or phone. If you have a critical or abnormal lab result, we will notify you by phone as soon as possible.  Submit refill requests through [employer] or call your pharmacy and they will forward the refill request to us. Please allow 3 business days for your refill to be completed.          Additional Information About Your Visit        Care EveryWhere ID     This is your Care EveryWhere ID. This could be used by other organizations to access your [facility] medical records  [id number]         Blood Pressure from Last 3 Encounters:   05/09/18 122/80   10/13/17 126/80   03/08/17 130/80    Weight from Last 3 Encounters:   05/09/18 57.7 kg (127 lb 4.8 oz)   10/13/17 60.3 kg (133 lb)   03/30/17 65.3 kg (144 lb)              We Performed the Following     CHIROPRAC MANIP,SPINAL,3-4 REGIONS        Primary Care Provider Office Phone # Fax #    Narendra Peters [phone]       [address] [facility] [address]        Equal  Access to Services     [facility]: Hadii aad ku hadjjregina Taliahussein, wastevenda luqreta, qamyesha kavestadann kent. So [facility] [phone].    ATENCIÓN: Si sheldonla erick, tiene a ferrari disposición servicios gratuitos de asistencia lingüística. Llame al [phone].    We comply with applicable federal civil rights laws and [place] laws. We do not discriminate on the basis of race, color, national origin, age, disability, sex, sexual orientation, or gender identity.            Thank you!     Thank you for choosing [facility]  for your care. Our goal is always to provide you with excellent care. Hearing back from our patients is one way we can continue to improve our services. Please take a few minutes to complete the written survey that you may receive in the mail after your visit with us. Thank you!             Your Updated Medication List - Protect others around you: Learn how to safely use, store and throw away your medicines at www.disposemymeds.org.          This list is accurate as of 9/21/18  2:04 PM.  Always use your most recent med list.                   Brand Name Dispense Instructions for use Diagnosis    ALPRAZolam 0.5 MG tablet    XANAX    2 tablet    Take 1 tablet one hour before MRI and may repeat if needed before MRI.    Claustrophobia       cyclobenzaprine 10 MG tablet    FLEXERIL    90 tablet    TAKE ONE TABLET BY MOUTH THREE TIMES A DAY AS NEEDED FOR MUSCLE SPASMS    Cervicalgia       gabapentin 300 MG capsule    NEURONTIN    90 capsule    TAKE ONE CAPSULE BY MOUTH THREE TIMES A DAY    Multiple sclerosis (H)       * levothyroxine 125 MCG tablet    SYNTHROID/LEVOTHROID    90 tablet    Take 1 tablet (125 mcg) by mouth daily    CARDIOVASCULAR SCREENING; LDL GOAL LESS THAN 160       * levothyroxine 112 MCG tablet    SYNTHROID/LEVOTHROID    60 tablet    Take 1 tablet (112 mcg) by mouth daily    Hypothyroidism, unspecified type       *  levothyroxine 100 MCG tablet    SYNTHROID/LEVOTHROID    90 tablet    Take 1 tablet (100 mcg) by mouth daily    Hypothyroidism, unspecified type       MULTIVITAMIN ADULT PO      Take 1 tablet by mouth daily        predniSONE 20 MG tablet    DELTASONE    18 tablet    Take 3 tabs daily for 3 days, then 2 tabs daily for 3 days, then 1 tab daily for 3 days    Lumbar back pain with radiculopathy affecting left lower extremity       sertraline 50 MG tablet    ZOLOFT    30 tablet    TAKE ONE TABLET BY MOUTH EVERY DAY    Major depressive disorder, recurrent episode, mild (H)       traMADol 50 MG tablet    ULTRAM    120 tablet    Take 1 tablet (50 mg) by mouth every 6 hours as needed    Cervicalgia       TYLENOL 325 MG tablet   Generic drug:  acetaminophen     100 tablet    Take 2 tablets (650 mg) by mouth every 4 hours as needed    Gastrointestinal hemorrhage associated with duodenal ulcer       * Notice:  This list has 3 medication(s) that are the same as other medications prescribed for you. Read the directions carefully, and ask your doctor or other care provider to review them with you.

## 2018-09-21 NOTE — PROGRESS NOTES
5/18/2018 Updated Lumbar MRI - IMPRESSION: I see no definite change since an MRI on 7/26/2016. There  are mild degenerative changes in the lower lumbar spine resulting in  mild central canal stenosis at L4-L5. No disc herniation or other site  of stenosis is seen.    Visit #:  4 of 12    Subjective:  Jaki Gómez is a 55 year old female who is seen in f/u up for:        Segmental dysfunction of cervical region  Segmental dysfunction of lumbar region  Brachial neuritis or radiculitis  Cervicalgia  Segmental dysfunction of thoracic region.     Since last visit on 8/10/2018,  Jaki Gómez reports: that she is awful. She went on a long camping week for 13 days, and has had a sore neck ever since. She plans to camp next weekend and wants to feel better for that. She switched out her mattress and forgot it at her campsite, so she has had a lot of pain. She points to pain in her right upper back, rated 9/10, and has been all week.       ADL's : ADLs have been difficult with exacerbation of pain.          Objective:  The following was observed:    P: pain elicited on palpation, bilateral upper back   A: static palpation demonstrates intersegmental asymmetry, as noted  R: motion palpation notes restricted motion  T: hypertonicity at:  traps bilaterally      Segmental spinal dysfunction/restrictions found at:  C2 LR, RRR  C5 RR, LRR  T1 RR, LRR  T2 LR, RRR  T7 E, FR   L4 RR, LRR  Left SI joint posterior      Assessment:    Diagnoses:      1. Segmental dysfunction of cervical region    2. Segmental dysfunction of lumbar region    3. Brachial neuritis or radiculitis    4. Cervicalgia    5. Segmental dysfunction of thoracic region        Patient's condition:  Patient had restrictions pre-manipulation and Patient had decreased motion prior to manipulation    Treatment effectiveness:  Post manipulation there is better intersegmental movement and Patient claims to feel looser post manipulation      Procedures:  CMT:  60835  Chiropractic manipulative treatment 3-4 regions performed   Cervical: Deiversified, C2, C5, Supine  Thoracic: Diversified, T1, T2, T7, Prone   Lumbar: Drop assist, , L4, Prone  Pelvis: Drop Assist, Left SI joint, Prone      Modalities:  MSTM to affected musculature      Therapeutic procedures:  Gave ice instructions post-adjustment, as needed.      Prognosis: Good    Progress towards Goals: Patient has been improving with care but had a recent flare-up of pain with flinging birdseed.    Recommendations:    Instructions:ice 20 minutes every other hour as needed and stretch as instructed at visit     Follow-up:  Return to care as needed after next camping trip.

## 2018-10-15 DIAGNOSIS — M54.2 CERVICALGIA: ICD-10-CM

## 2018-10-15 RX ORDER — TRAMADOL HYDROCHLORIDE 50 MG/1
50 TABLET ORAL EVERY 6 HOURS PRN
Qty: 120 TABLET | Refills: 0 | Status: SHIPPED | OUTPATIENT
Start: 2018-10-15 | End: 2018-11-13

## 2018-10-15 NOTE — TELEPHONE ENCOUNTER
Tramadol 50 MG       Last Written Prescription Date:  9-19-18  Last Fill Quantity: 120,   # refills: 0  Last Office Visit: 5/9/18  Future Office visit:       Routing refill request to provider for review/approval because:  Drug not on the [facility], [facility] or [facility] refill protocol or controlled substance

## 2018-10-17 ENCOUNTER — THERAPY VISIT (OUTPATIENT)
Dept: CHIROPRACTIC MEDICINE | Facility: CLINIC | Age: 58
End: 2018-10-17
Payer: COMMERCIAL

## 2018-10-17 DIAGNOSIS — M99.04 SEGMENTAL DYSFUNCTION OF SACRAL REGION: Primary | ICD-10-CM

## 2018-10-17 DIAGNOSIS — M99.01 SEGMENTAL DYSFUNCTION OF CERVICAL REGION: ICD-10-CM

## 2018-10-17 DIAGNOSIS — M99.03 SEGMENTAL DYSFUNCTION OF LUMBAR REGION: ICD-10-CM

## 2018-10-17 DIAGNOSIS — M54.2 CERVICALGIA: ICD-10-CM

## 2018-10-17 DIAGNOSIS — M54.12 BRACHIAL NEURITIS OR RADICULITIS: ICD-10-CM

## 2018-10-17 DIAGNOSIS — M99.02 SEGMENTAL DYSFUNCTION OF THORACIC REGION: ICD-10-CM

## 2018-10-17 PROCEDURE — 98941 CHIROPRACT MANJ 3-4 REGIONS: CPT | Mod: AT | Performed by: CHIROPRACTOR

## 2018-10-17 NOTE — MR AVS SNAPSHOT
"              After Visit Summary   10/17/2018    Jaki Gómez    MRN: [id number]           Patient Information     Date Of Birth          1960        Visit Information        Provider Department      10/17/2018 1:00 PM Sharri Nicolas DC [facility]        Today's Diagnoses     Segmental dysfunction of sacral region    -  1    Segmental dysfunction of cervical region        Segmental dysfunction of lumbar region        Brachial neuritis or radiculitis        Cervicalgia        Segmental dysfunction of thoracic region           Follow-ups after your visit        Who to contact     If you have questions or need follow up information about today's clinic visit or your schedule please contact [facility] directly at [phone].  Normal or non-critical lab and imaging results will be communicated to you by semanticlabshart, letter or phone within 4 business days after the clinic has received the results. If you do not hear from us within 7 days, please contact the clinic through semanticlabshart or phone. If you have a critical or abnormal lab result, we will notify you by phone as soon as possible.  Submit refill requests through [employer] or call your pharmacy and they will forward the refill request to us. Please allow 3 business days for your refill to be completed.          Additional Information About Your Visit        MyChart Information     [employer] lets you send messages to your doctor, view your test results, renew your prescriptions, schedule appointments and more. To sign up, go to [email or web address] . Click on \"Log in\" on the left side of the screen, which will take you to the Welcome page. Then click on \"Sign up Now\" on the right side of the page.     You will be asked to enter the access code listed below, as well as some personal information. Please follow the directions to create your username and password.     Your access code is: [email or web address]  Expires: " 1/15/2019  1:10 PM     Your access code will  in 90 days. If you need help or a new code, please call your [place] clinic or [phone].        Care EveryWhere ID     This is your Care EveryWhere ID. This could be used by other organizations to access your [place] medical records  [id number]         Blood Pressure from Last 3 Encounters:   18 122/80   10/13/17 126/80   17 130/80    Weight from Last 3 Encounters:   18 57.7 kg (127 lb 4.8 oz)   10/13/17 60.3 kg (133 lb)   17 65.3 kg (144 lb)              We Performed the Following     CHIROPRAC MANIP,SPINAL,3-4 REGIONS        Primary Care Provider Office Phone # Fax #    Narendra Peters [phone]       [address] [facility] [address]        Equal Access to Services     REYMUNDO THOMAS : Hadii aad ku hadasho Soomaali, waaxda luqadaha, qaybta kaalmada adeegyada, waxay bibiin hayjulisa hammond . So [facility] [phone].    ATENCIÓN: Si sheldonla español, tiene a ferrari disposición servicios gratuitos de asistencia lingüística. Llame al [phone].    We comply with applicable federal civil rights laws and [place] laws. We do not discriminate on the basis of race, color, national origin, age, disability, sex, sexual orientation, or gender identity.            Thank you!     Thank you for choosing [facility]  for your care. Our goal is always to provide you with excellent care. Hearing back from our patients is one way we can continue to improve our services. Please take a few minutes to complete the written survey that you may receive in the mail after your visit with us. Thank you!             Your Updated Medication List - Protect others around you: Learn how to safely use, store and throw away your medicines at www.disposemymeds.org.          This list is accurate as of 10/17/18  1:10 PM.  Always use your most recent med list.                   Brand Name Dispense  Instructions for use Diagnosis    ALPRAZolam 0.5 MG tablet    XANAX    2 tablet    Take 1 tablet one hour before MRI and may repeat if needed before MRI.    Claustrophobia       cyclobenzaprine 10 MG tablet    FLEXERIL    90 tablet    TAKE ONE TABLET BY MOUTH THREE TIMES A DAY AS NEEDED FOR MUSCLE SPASMS    Cervicalgia       gabapentin 300 MG capsule    NEURONTIN    90 capsule    TAKE ONE CAPSULE BY MOUTH THREE TIMES A DAY    Multiple sclerosis (H)       * levothyroxine 125 MCG tablet    SYNTHROID/LEVOTHROID    90 tablet    Take 1 tablet (125 mcg) by mouth daily    CARDIOVASCULAR SCREENING; LDL GOAL LESS THAN 160       * levothyroxine 112 MCG tablet    SYNTHROID/LEVOTHROID    60 tablet    Take 1 tablet (112 mcg) by mouth daily    Hypothyroidism, unspecified type       * levothyroxine 100 MCG tablet    SYNTHROID/LEVOTHROID    90 tablet    Take 1 tablet (100 mcg) by mouth daily    Hypothyroidism, unspecified type       MULTIVITAMIN ADULT PO      Take 1 tablet by mouth daily        predniSONE 20 MG tablet    DELTASONE    18 tablet    Take 3 tabs daily for 3 days, then 2 tabs daily for 3 days, then 1 tab daily for 3 days    Lumbar back pain with radiculopathy affecting left lower extremity       sertraline 50 MG tablet    ZOLOFT    30 tablet    TAKE ONE TABLET BY MOUTH EVERY DAY    Major depressive disorder, recurrent episode, mild (H)       traMADol 50 MG tablet    ULTRAM    120 tablet    Take 1 tablet (50 mg) by mouth every 6 hours as needed    Cervicalgia       TYLENOL 325 MG tablet   Generic drug:  acetaminophen     100 tablet    Take 2 tablets (650 mg) by mouth every 4 hours as needed    Gastrointestinal hemorrhage associated with duodenal ulcer       * Notice:  This list has 3 medication(s) that are the same as other medications prescribed for you. Read the directions carefully, and ask your doctor or other care provider to review them with you.

## 2018-10-17 NOTE — PROGRESS NOTES
"5/18/2018 Updated Lumbar MRI - IMPRESSION: I see no definite change since an MRI on 7/26/2016. There  are mild degenerative changes in the lower lumbar spine resulting in  mild central canal stenosis at L4-L5. No disc herniation or other site  of stenosis is seen.    Visit #:  5 of 12    Subjective:  Jaki Gómez is a 55 year old female who is seen in f/u up for:        Segmental dysfunction of cervical region  Segmental dysfunction of lumbar region  Brachial neuritis or radiculitis  Cervicalgia  Segmental dysfunction of thoracic region.     Since last visit on 9/21/2018,  Jaki Gómez reports: that she is doing pretty good today. Her lower back is bothering her today. She rates her current pain 6/10 today, due to the \"usual.\" Her right arm has been going numb but she hasn't had the burning.       ADL's : ADLs have been difficult with exacerbation of pain.          Objective:  The following was observed:    P: pain elicited on palpation, bilateral upper back   A: static palpation demonstrates intersegmental asymmetry, as noted  R: motion palpation notes restricted motion  T: hypertonicity at:  traps bilaterally      Segmental spinal dysfunction/restrictions found at:  C2 LR, RRR  C5 RR, LRR  T1 RR, LRR  T7 E, FR   L4 RR, LRR  Left SI joint posterior      Assessment:    Diagnoses:      1. Segmental dysfunction of cervical region    2. Segmental dysfunction of lumbar region    3. Brachial neuritis or radiculitis    4. Cervicalgia    5. Segmental dysfunction of thoracic region        Patient's condition:  Patient had restrictions pre-manipulation and Patient had decreased motion prior to manipulation    Treatment effectiveness:  Post manipulation there is better intersegmental movement and Patient claims to feel looser post manipulation      Procedures:  CMT:  00184 Chiropractic manipulative treatment 3-4 regions performed   Cervical: Deiversified, C2, C5, Supine  Thoracic: Diversified, T1,  T7, Prone   Lumbar: " Drop assist, , L4, Prone  Pelvis: Drop Assist, Left SI joint, Prone      Modalities:  MSTM to affected musculature      Therapeutic procedures:  Gave ice instructions post-adjustment, as needed.      Prognosis: Good    Progress towards Goals: Patient has been improving with care but had a recent flare-up of pain with flinging birdseed.    Recommendations:    Instructions:ice 20 minutes every other hour as needed and stretch as instructed at visit     Follow-up:  Return to care as needed.

## 2018-11-13 DIAGNOSIS — M54.2 CERVICALGIA: ICD-10-CM

## 2018-11-14 ENCOUNTER — THERAPY VISIT (OUTPATIENT)
Dept: CHIROPRACTIC MEDICINE | Facility: CLINIC | Age: 58
End: 2018-11-14
Payer: COMMERCIAL

## 2018-11-14 DIAGNOSIS — M99.02 SEGMENTAL DYSFUNCTION OF THORACIC REGION: ICD-10-CM

## 2018-11-14 DIAGNOSIS — M54.12 BRACHIAL NEURITIS OR RADICULITIS: ICD-10-CM

## 2018-11-14 DIAGNOSIS — M99.01 SEGMENTAL DYSFUNCTION OF CERVICAL REGION: ICD-10-CM

## 2018-11-14 DIAGNOSIS — M99.03 SEGMENTAL DYSFUNCTION OF LUMBAR REGION: ICD-10-CM

## 2018-11-14 DIAGNOSIS — M54.2 CERVICALGIA: ICD-10-CM

## 2018-11-14 PROCEDURE — 98940 CHIROPRACT MANJ 1-2 REGIONS: CPT | Mod: AT | Performed by: CHIROPRACTOR

## 2018-11-14 RX ORDER — TRAMADOL HYDROCHLORIDE 50 MG/1
50 TABLET ORAL EVERY 6 HOURS PRN
Qty: 120 TABLET | Refills: 0 | Status: SHIPPED | OUTPATIENT
Start: 2018-11-14 | End: 2018-12-10

## 2018-11-14 NOTE — PROGRESS NOTES
5/18/2018 Updated Lumbar MRI - IMPRESSION: I see no definite change since an MRI on 7/26/2016. There  are mild degenerative changes in the lower lumbar spine resulting in  mild central canal stenosis at L4-L5. No disc herniation or other site  of stenosis is seen.    Visit #:  6 of 12    Subjective:  Jaki Gómez is a 58 year old female who is seen in f/u up for:        Segmental dysfunction of cervical region  Segmental dysfunction of lumbar region  Brachial neuritis or radiculitis  Cervicalgia  Segmental dysfunction of thoracic region.     Since last visit on 10/17/2018,  Jaki Gómez reports: that she is having pain in the left side of her neck. She is unsure of what happened. She points to pain in her left upper traps, rated 9.9/10.       ADL's : ADLs have been difficult with exacerbation of pain.          Objective:  The following was observed:    P: pain elicited on palpation, bilateral upper back   A: static palpation demonstrates intersegmental asymmetry, as noted  R: motion palpation notes restricted motion  T: hypertonicity at:  traps bilaterally      Segmental spinal dysfunction/restrictions found at:  C2 LR, RRR  C5 RR, LRR  T1 RR, LRR  T3 LR, RRR  T7 E, FR         Assessment:    Diagnoses:      1. Segmental dysfunction of cervical region    2. Segmental dysfunction of lumbar region    3. Brachial neuritis or radiculitis    4. Cervicalgia    5. Segmental dysfunction of thoracic region        Patient's condition:  Patient had restrictions pre-manipulation and Patient had decreased motion prior to manipulation    Treatment effectiveness:  Post manipulation there is better intersegmental movement and Patient claims to feel looser post manipulation      Procedures:  CMT:  31172 Chiropractic manipulative treatment 1-2 regions performed   Cervical: Deiversified, C2, C5, Supine  Thoracic: Diversified, T1, T3, T7, Prone       Modalities:  MSTM to affected musculature      Therapeutic procedures:  Gave ice  instructions post-adjustment, as needed.      Prognosis: Good    Progress towards Goals: Patient has been improving with care but had a recent flare-up of pain with flinging birdseed.    Recommendations:    Instructions:ice 20 minutes every other hour as needed and stretch as instructed at visit     Follow-up:  Return to care as needed.

## 2018-11-14 NOTE — MR AVS SNAPSHOT
"              After Visit Summary   2018    Jaki Gómez    MRN: [id number]           Patient Information     Date Of Birth          1960        Visit Information        Provider Department      2018 1:00 PM Sharri Nicolas DC [facility]        Today's Diagnoses     Segmental dysfunction of cervical region        Segmental dysfunction of lumbar region        Brachial neuritis or radiculitis        Cervicalgia        Segmental dysfunction of thoracic region           Follow-ups after your visit        Who to contact     If you have questions or need follow up information about today's clinic visit or your schedule please contact [facility] directly at [phone].  Normal or non-critical lab and imaging results will be communicated to you by PatientsLikeMehart, letter or phone within 4 business days after the clinic has received the results. If you do not hear from us within 7 days, please contact the clinic through [employer] or phone. If you have a critical or abnormal lab result, we will notify you by phone as soon as possible.  Submit refill requests through [employer] or call your pharmacy and they will forward the refill request to us. Please allow 3 business days for your refill to be completed.          Additional Information About Your Visit        MyChart Information     [employer] lets you send messages to your doctor, view your test results, renew your prescriptions, schedule appointments and more. To sign up, go to [email or web address] . Click on \"Log in\" on the left side of the screen, which will take you to the Welcome page. Then click on \"Sign up Now\" on the right side of the page.     You will be asked to enter the access code listed below, as well as some personal information. Please follow the directions to create your username and password.     Your access code is: [email or web address]  Expires: 1/15/2019 12:10 PM     Your access code will  in 90 " days. If you need help or a new code, please call your [place] clinic or [phone].        Care EveryWhere ID     This is your Care EveryWhere ID. This could be used by other organizations to access your [place] medical records  [id number]         Blood Pressure from Last 3 Encounters:   05/09/18 122/80   10/13/17 126/80   03/08/17 130/80    Weight from Last 3 Encounters:   05/09/18 57.7 kg (127 lb 4.8 oz)   10/13/17 60.3 kg (133 lb)   03/30/17 65.3 kg (144 lb)              We Performed the Following     CHIROPRAC MANIP,SPINAL,1-2 REGIONS        Primary Care Provider Office Phone # Fax #    Narendra Peters [phone]       [address] [facility] [address]        Equal Access to Services     [facility]: Hadii delmy francisco hadasho Sohussein, waaxda luqadaha, qaybta kaalmada adeegyada, dann aguayo hayjulisa hammond . So [facility] [phone].    ATENCIÓN: Si habla español, tiene a ferrari disposición servicios gratuitos de asistencia lingüística. Myron al [phone].    We comply with applicable federal civil rights laws and [place] laws. We do not discriminate on the basis of race, color, national origin, age, disability, sex, sexual orientation, or gender identity.            Thank you!     Thank you for choosing [place] [facility]  for your care. Our goal is always to provide you with excellent care. Hearing back from our patients is one way we can continue to improve our services. Please take a few minutes to complete the written survey that you may receive in the mail after your visit with us. Thank you!             Your Updated Medication List - Protect others around you: Learn how to safely use, store and throw away your medicines at www.disposemymeds.org.          This list is accurate as of 11/14/18  1:12 PM.  Always use your most recent med list.                   Brand Name Dispense Instructions for use Diagnosis    ALPRAZolam 0.5 MG tablet    XANAX     2 tablet    Take 1 tablet one hour before MRI and may repeat if needed before MRI.    Claustrophobia       cyclobenzaprine 10 MG tablet    FLEXERIL    90 tablet    TAKE ONE TABLET BY MOUTH THREE TIMES A DAY AS NEEDED FOR MUSCLE SPASMS    Cervicalgia       gabapentin 300 MG capsule    NEURONTIN    90 capsule    TAKE ONE CAPSULE BY MOUTH THREE TIMES A DAY    Multiple sclerosis (H)       * levothyroxine 125 MCG tablet    SYNTHROID/LEVOTHROID    90 tablet    Take 1 tablet (125 mcg) by mouth daily    CARDIOVASCULAR SCREENING; LDL GOAL LESS THAN 160       * levothyroxine 112 MCG tablet    SYNTHROID/LEVOTHROID    60 tablet    Take 1 tablet (112 mcg) by mouth daily    Hypothyroidism, unspecified type       * levothyroxine 100 MCG tablet    SYNTHROID/LEVOTHROID    90 tablet    Take 1 tablet (100 mcg) by mouth daily    Hypothyroidism, unspecified type       MULTIVITAMIN ADULT PO      Take 1 tablet by mouth daily        predniSONE 20 MG tablet    DELTASONE    18 tablet    Take 3 tabs daily for 3 days, then 2 tabs daily for 3 days, then 1 tab daily for 3 days    Lumbar back pain with radiculopathy affecting left lower extremity       sertraline 50 MG tablet    ZOLOFT    30 tablet    TAKE ONE TABLET BY MOUTH EVERY DAY    Major depressive disorder, recurrent episode, mild (H)       traMADol 50 MG tablet    ULTRAM    120 tablet    Take 1 tablet (50 mg) by mouth every 6 hours as needed    Cervicalgia       TYLENOL 325 MG tablet   Generic drug:  acetaminophen     100 tablet    Take 2 tablets (650 mg) by mouth every 4 hours as needed    Gastrointestinal hemorrhage associated with duodenal ulcer       * Notice:  This list has 3 medication(s) that are the same as other medications prescribed for you. Read the directions carefully, and ask your doctor or other care provider to review them with you.

## 2018-12-10 ENCOUNTER — THERAPY VISIT (OUTPATIENT)
Dept: CHIROPRACTIC MEDICINE | Facility: CLINIC | Age: 58
End: 2018-12-10
Payer: COMMERCIAL

## 2018-12-10 DIAGNOSIS — M99.01 SEGMENTAL DYSFUNCTION OF CERVICAL REGION: ICD-10-CM

## 2018-12-10 DIAGNOSIS — M54.2 CERVICALGIA: ICD-10-CM

## 2018-12-10 DIAGNOSIS — E03.9 HYPOTHYROIDISM, UNSPECIFIED TYPE: Primary | ICD-10-CM

## 2018-12-10 DIAGNOSIS — M99.03 SEGMENTAL DYSFUNCTION OF LUMBAR REGION: ICD-10-CM

## 2018-12-10 DIAGNOSIS — M99.02 SEGMENTAL DYSFUNCTION OF THORACIC REGION: ICD-10-CM

## 2018-12-10 DIAGNOSIS — M54.12 BRACHIAL NEURITIS OR RADICULITIS: ICD-10-CM

## 2018-12-10 LAB — TSH SERPL DL<=0.005 MIU/L-ACNC: 1.15 MU/L (ref 0.4–4)

## 2018-12-10 PROCEDURE — 36415 COLL VENOUS BLD VENIPUNCTURE: CPT | Performed by: FAMILY MEDICINE

## 2018-12-10 PROCEDURE — 98941 CHIROPRACT MANJ 3-4 REGIONS: CPT | Mod: AT | Performed by: CHIROPRACTOR

## 2018-12-10 PROCEDURE — 84443 ASSAY THYROID STIM HORMONE: CPT | Performed by: FAMILY MEDICINE

## 2018-12-10 NOTE — PROGRESS NOTES
"5/18/2018 Updated Lumbar MRI - IMPRESSION: I see no definite change since an MRI on 7/26/2016. There  are mild degenerative changes in the lower lumbar spine resulting in  mild central canal stenosis at L4-L5. No disc herniation or other site  of stenosis is seen.    Visit #:  7 of 12    Subjective:  Jaki Gómez is a 58 year old female who is seen in f/u up for:        Segmental dysfunction of cervical region  Segmental dysfunction of lumbar region  Brachial neuritis or radiculitis  Cervicalgia  Segmental dysfunction of thoracic region.     Since last visit on 11/14/2018,  Jaki Gómez reports: that she is doing well today but she is \"wreck today.\" Her left neck is still really hurting, and she feels like her \"whole waist is being choked.\"      ADL's : ADLs have been difficult with exacerbation of pain.          Objective:  The following was observed:    P: pain elicited on palpation, bilateral upper back   A: static palpation demonstrates intersegmental asymmetry, as noted  R: motion palpation notes restricted motion  T: hypertonicity at:  traps bilaterally      Segmental spinal dysfunction/restrictions found at:  C2 LR, RRR  C5 RR, LRR  T1 RR, LRR  T3 LR, RRR  T7 E, FR   Left SI posterior        Assessment:    Diagnoses:      1. Segmental dysfunction of cervical region    2. Segmental dysfunction of lumbar region    3. Brachial neuritis or radiculitis    4. Cervicalgia    5. Segmental dysfunction of thoracic region        Patient's condition:  Patient had restrictions pre-manipulation and Patient had decreased motion prior to manipulation    Treatment effectiveness:  Post manipulation there is better intersegmental movement and Patient claims to feel looser post manipulation      Procedures:  CMT:  69187 Chiropractic manipulative treatment 3-4 regions performed   Cervical: Deiversified, C2, C5, Supine  Thoracic: Diversified, T1, T3, T7, Prone   Pelvis: Drop assist, Left SI, Prone    Modalities:  MSTM to " affected musculature      Therapeutic procedures:  Gave ice instructions post-adjustment, as needed.      Prognosis: Good    Progress towards Goals: Patient has been improving with care but had a recent flare-up of pain with flinging birdseed.    Recommendations:    Instructions:ice 20 minutes every other hour as needed and stretch as instructed at visit     Follow-up:  Return to care as needed.

## 2018-12-10 NOTE — LETTER
December 11, 2018      Jaki Gómez  [address] [facility] [address]        Dear ,    We are writing to inform you of your test results.    Thyroid test now normal.     Resulted Orders   TSH with free T4 reflex   Result Value Ref Range    TSH 1.15 0.40 - 4.00 mU/L       If you have any questions or concerns, please call the clinic at the number listed above.       Sincerely,        St. Narendra Peters

## 2018-12-11 RX ORDER — TRAMADOL HYDROCHLORIDE 50 MG/1
50 TABLET ORAL EVERY 6 HOURS PRN
Qty: 120 TABLET | Refills: 0 | Status: SHIPPED | OUTPATIENT
Start: 2018-12-11 | End: 2019-01-07

## 2018-12-11 NOTE — TELEPHONE ENCOUNTER
Tramadol 50 MG       Last Written Prescription Date:  11/14/18  Last Fill Quantity: 120,   # refills: 0  Last Office Visit: 5/9/18  Future Office visit:       Routing refill request to provider for review/approval because:  Drug not on the [facility], [facility] or [facility] refill protocol or controlled substance

## 2019-01-07 DIAGNOSIS — M54.2 CERVICALGIA: ICD-10-CM

## 2019-01-07 RX ORDER — TRAMADOL HYDROCHLORIDE 50 MG/1
50 TABLET ORAL EVERY 6 HOURS PRN
Qty: 120 TABLET | Refills: 0 | Status: SHIPPED | OUTPATIENT
Start: 2019-01-07 | End: 2019-02-04

## 2019-01-07 RX ORDER — CYCLOBENZAPRINE HCL 10 MG
TABLET ORAL
Qty: 90 TABLET | Refills: 3 | Status: SHIPPED | OUTPATIENT
Start: 2019-01-07 | End: 2020-09-28

## 2019-01-07 NOTE — TELEPHONE ENCOUNTER
Cyclobenzaprine 10 MG       Last Written Prescription Date:  12/13/17  Last Fill Quantity: 90,   # refills: 3  Last Office Visit: 5/9/18  Future Office visit:       Routing refill request to provider for review/approval because:  Drug not on the [facility], [facility] or [facility] refill protocol or controlled substance

## 2019-01-07 NOTE — TELEPHONE ENCOUNTER
Tramadol 50 MG       Last Written Prescription Date:  12/11/18  Last Fill Quantity: 120,   # refills: 0  Last Office Visit: 5/9/18  Future Office visit:       Routing refill request to provider for review/approval because:  Drug not on the [facility], [facility] or [facility] refill protocol or controlled substance

## 2019-01-10 ENCOUNTER — TELEPHONE (OUTPATIENT)
Dept: FAMILY MEDICINE | Facility: CLINIC | Age: 59
End: 2019-01-10

## 2019-01-10 NOTE — TELEPHONE ENCOUNTER
Panel Management Review      Patient has the following on her problem list: None      Composite cancer screening  Chart review shows that this patient is due/due soon for the following Pap Smear and Colonoscopy  Summary:    Patient is due/failing the following:   COLONOSCOPY, PAP, PHQ9 and PHYSICAL    Action needed:   Patient needs office visit for physical. and Patient needs referral/order: colonoscopy    Type of outreach:    Phone, spoke to patient.  scheduled 1/31 for physical    Questions for provider review:    None                                                                                                                                    Chaya Castaneda MA     Chart routed to Care Team .

## 2019-01-21 ENCOUNTER — THERAPY VISIT (OUTPATIENT)
Dept: CHIROPRACTIC MEDICINE | Facility: CLINIC | Age: 59
End: 2019-01-21
Payer: COMMERCIAL

## 2019-01-21 DIAGNOSIS — M99.01 SEGMENTAL DYSFUNCTION OF CERVICAL REGION: ICD-10-CM

## 2019-01-21 DIAGNOSIS — M54.12 BRACHIAL NEURITIS OR RADICULITIS: ICD-10-CM

## 2019-01-21 DIAGNOSIS — M54.2 CERVICALGIA: ICD-10-CM

## 2019-01-21 DIAGNOSIS — M99.03 SEGMENTAL DYSFUNCTION OF LUMBAR REGION: ICD-10-CM

## 2019-01-21 DIAGNOSIS — M99.02 SEGMENTAL DYSFUNCTION OF THORACIC REGION: ICD-10-CM

## 2019-01-21 PROCEDURE — 98941 CHIROPRACT MANJ 3-4 REGIONS: CPT | Mod: AT | Performed by: CHIROPRACTOR

## 2019-01-21 NOTE — PROGRESS NOTES
"5/18/2018 Updated Lumbar MRI - IMPRESSION: I see no definite change since an MRI on 7/26/2016. There  are mild degenerative changes in the lower lumbar spine resulting in  mild central canal stenosis at L4-L5. No disc herniation or other site  of stenosis is seen.    Visit #:  8 of 12    Subjective:  Jaki Gómez is a 58 year old female who is seen in f/u up for:        Segmental dysfunction of cervical region  Segmental dysfunction of lumbar region  Brachial neuritis or radiculitis  Cervicalgia  Segmental dysfunction of thoracic region.     Since last visit on 12/10/2018,  Jaki Gómez reports: that she is having a \"bad neck.\" It came on last week but she isn't sure what brought it on. She rates her current pain 7/10. She denies radiation of symptoms into her UE. She denies headaches. Patient states that she has lost weight recently, without trying, but thinks it is due to her depression and lack of desire to eat.       ADL's : ADLs have been difficult with exacerbation of pain.          Objective:  The following was observed:    P: pain elicited on palpation, bilateral upper back   A: static palpation demonstrates intersegmental asymmetry, as noted  R: motion palpation notes restricted motion  T: hypertonicity at:  traps bilaterally      Segmental spinal dysfunction/restrictions found at:  C1 RR, LRR  C2 LR, RRR  T1 RR, LRR  T3 LR, RRR  T7 E, FR   Right SI posterior        Assessment:    Diagnoses:      1. Segmental dysfunction of cervical region    2. Segmental dysfunction of lumbar region    3. Brachial neuritis or radiculitis    4. Cervicalgia    5. Segmental dysfunction of thoracic region        Patient's condition:  Patient had restrictions pre-manipulation and Patient had decreased motion prior to manipulation    Treatment effectiveness:  Post manipulation there is better intersegmental movement and Patient claims to feel looser post manipulation      Procedures:  CMT:  66024 Chiropractic manipulative " treatment 3-4 regions performed   Cervical: Deiversified, C1, C2, Supine  Thoracic: Diversified, T1, T3, T7, Prone   Pelvis: Drop assist, Right SI, Prone    Modalities:  MSTM to affected musculature      Therapeutic procedures:  Gave ice instructions post-adjustment, as needed.      Prognosis: Good    Progress towards Goals: Patient has been improving with care but had a recent flare-up of pain with flinging birdseed.    Recommendations:    Instructions:ice 20 minutes every other hour as needed and stretch as instructed at visit     Follow-up:  Return to care as needed. Patient is seeing Dr. Peters this month for depression and weight loss.

## 2019-01-30 ENCOUNTER — TELEPHONE (OUTPATIENT)
Dept: FAMILY MEDICINE | Facility: CLINIC | Age: 59
End: 2019-01-30

## 2019-01-30 NOTE — TELEPHONE ENCOUNTER
Panel Management Review      Patient has the following on her problem list:     Depression / Dysthymia review    Measure:  Needs PHQ-9 score of 4 or less during index window.  Administer PHQ-9 and if score is 5 or more, send encounter to provider for next steps.    5 - 7 month window range: No    PHQ-9 SCORE 10/4/2017 10/13/2017 4/19/2018   PHQ-9 Total Score - - -   PHQ-9 Total Score 14 18 17       If PHQ-9 recheck is 5 or more, route to provider for next steps.    Patient is due for:  PHQ9      Composite cancer screening  Chart review shows that this patient is due/due soon for the following Pap Smear  Summary:    Patient is due/failing the following:   RACQUEL, COLONOSCOPY, PAP and PHQ9    Action needed:   Patient needs office visit for Pap. and Patient needs referral/order: Colonoscopy    Type of outreach:    Patient has appointment 1/31/19    Questions for provider review:    None                                                                                                                                    Chaya Castaneda MA     Chart routed to Care Team .

## 2019-01-31 ENCOUNTER — OFFICE VISIT (OUTPATIENT)
Dept: FAMILY MEDICINE | Facility: CLINIC | Age: 59
End: 2019-01-31
Payer: COMMERCIAL

## 2019-01-31 VITALS
HEART RATE: 89 BPM | TEMPERATURE: 97.5 F | DIASTOLIC BLOOD PRESSURE: 72 MMHG | SYSTOLIC BLOOD PRESSURE: 118 MMHG | OXYGEN SATURATION: 96 % | HEIGHT: 59 IN | WEIGHT: 116 LBS | BODY MASS INDEX: 23.39 KG/M2 | RESPIRATION RATE: 16 BRPM

## 2019-01-31 DIAGNOSIS — Z23 NEED FOR PROPHYLACTIC VACCINATION AND INOCULATION AGAINST INFLUENZA: ICD-10-CM

## 2019-01-31 DIAGNOSIS — Z23 NEED FOR VACCINATION: ICD-10-CM

## 2019-01-31 DIAGNOSIS — Z13.6 SCREENING FOR CARDIOVASCULAR CONDITION: ICD-10-CM

## 2019-01-31 DIAGNOSIS — Z00.00 ROUTINE HISTORY AND PHYSICAL EXAMINATION OF ADULT: Primary | ICD-10-CM

## 2019-01-31 DIAGNOSIS — F33.0 MAJOR DEPRESSIVE DISORDER, RECURRENT EPISODE, MILD (H): ICD-10-CM

## 2019-01-31 DIAGNOSIS — Z12.11 SPECIAL SCREENING FOR MALIGNANT NEOPLASMS, COLON: ICD-10-CM

## 2019-01-31 LAB
ALBUMIN SERPL-MCNC: 4 G/DL (ref 3.4–5)
ALP SERPL-CCNC: 121 U/L (ref 40–150)
ALT SERPL W P-5'-P-CCNC: 27 U/L (ref 0–50)
ANION GAP SERPL CALCULATED.3IONS-SCNC: 6 MMOL/L (ref 3–14)
AST SERPL W P-5'-P-CCNC: 28 U/L (ref 0–45)
BILIRUB SERPL-MCNC: 0.3 MG/DL (ref 0.2–1.3)
BUN SERPL-MCNC: 6 MG/DL (ref 7–30)
CALCIUM SERPL-MCNC: 8.9 MG/DL (ref 8.5–10.1)
CHLORIDE SERPL-SCNC: 98 MMOL/L (ref 94–109)
CHOLEST SERPL-MCNC: 176 MG/DL
CO2 SERPL-SCNC: 30 MMOL/L (ref 20–32)
CREAT SERPL-MCNC: 0.62 MG/DL (ref 0.52–1.04)
GFR SERPL CREATININE-BSD FRML MDRD: >90 ML/MIN/{1.73_M2}
GLUCOSE SERPL-MCNC: 96 MG/DL (ref 70–99)
HDLC SERPL-MCNC: 82 MG/DL
LDLC SERPL CALC-MCNC: 42 MG/DL
NONHDLC SERPL-MCNC: 94 MG/DL
POTASSIUM SERPL-SCNC: 3.6 MMOL/L (ref 3.4–5.3)
PROT SERPL-MCNC: 7.8 G/DL (ref 6.8–8.8)
SODIUM SERPL-SCNC: 134 MMOL/L (ref 133–144)
TRIGL SERPL-MCNC: 259 MG/DL

## 2019-01-31 PROCEDURE — 99213 OFFICE O/P EST LOW 20 MIN: CPT | Mod: 25 | Performed by: FAMILY MEDICINE

## 2019-01-31 PROCEDURE — 80061 LIPID PANEL: CPT | Performed by: FAMILY MEDICINE

## 2019-01-31 PROCEDURE — 99396 PREV VISIT EST AGE 40-64: CPT | Mod: 25 | Performed by: FAMILY MEDICINE

## 2019-01-31 PROCEDURE — 36415 COLL VENOUS BLD VENIPUNCTURE: CPT | Performed by: FAMILY MEDICINE

## 2019-01-31 PROCEDURE — G0145 SCR C/V CYTO,THINLAYER,RESCR: HCPCS | Performed by: FAMILY MEDICINE

## 2019-01-31 PROCEDURE — 90472 IMMUNIZATION ADMIN EACH ADD: CPT | Performed by: FAMILY MEDICINE

## 2019-01-31 PROCEDURE — 90682 RIV4 VACC RECOMBINANT DNA IM: CPT | Performed by: FAMILY MEDICINE

## 2019-01-31 PROCEDURE — 90715 TDAP VACCINE 7 YRS/> IM: CPT | Performed by: FAMILY MEDICINE

## 2019-01-31 PROCEDURE — 80053 COMPREHEN METABOLIC PANEL: CPT | Performed by: FAMILY MEDICINE

## 2019-01-31 PROCEDURE — 87624 HPV HI-RISK TYP POOLED RSLT: CPT | Performed by: FAMILY MEDICINE

## 2019-01-31 PROCEDURE — 90471 IMMUNIZATION ADMIN: CPT | Performed by: FAMILY MEDICINE

## 2019-01-31 ASSESSMENT — PATIENT HEALTH QUESTIONNAIRE - PHQ9
10. IF YOU CHECKED OFF ANY PROBLEMS, HOW DIFFICULT HAVE THESE PROBLEMS MADE IT FOR YOU TO DO YOUR WORK, TAKE CARE OF THINGS AT HOME, OR GET ALONG WITH OTHER PEOPLE: VERY DIFFICULT
SUM OF ALL RESPONSES TO PHQ QUESTIONS 1-9: 17
SUM OF ALL RESPONSES TO PHQ QUESTIONS 1-9: 17
5. POOR APPETITE OR OVEREATING: MORE THAN HALF THE DAYS

## 2019-01-31 ASSESSMENT — ANXIETY QUESTIONNAIRES
1. FEELING NERVOUS, ANXIOUS, OR ON EDGE: MORE THAN HALF THE DAYS
2. NOT BEING ABLE TO STOP OR CONTROL WORRYING: NOT AT ALL
IF YOU CHECKED OFF ANY PROBLEMS ON THIS QUESTIONNAIRE, HOW DIFFICULT HAVE THESE PROBLEMS MADE IT FOR YOU TO DO YOUR WORK, TAKE CARE OF THINGS AT HOME, OR GET ALONG WITH OTHER PEOPLE: SOMEWHAT DIFFICULT
6. BECOMING EASILY ANNOYED OR IRRITABLE: NEARLY EVERY DAY
5. BEING SO RESTLESS THAT IT IS HARD TO SIT STILL: SEVERAL DAYS
GAD7 TOTAL SCORE: 9
3. WORRYING TOO MUCH ABOUT DIFFERENT THINGS: NOT AT ALL
7. FEELING AFRAID AS IF SOMETHING AWFUL MIGHT HAPPEN: SEVERAL DAYS

## 2019-01-31 ASSESSMENT — ENCOUNTER SYMPTOMS
CONSTIPATION: 0
FREQUENCY: 0
EYE PAIN: 0
NERVOUS/ANXIOUS: 0
DIARRHEA: 0
COUGH: 0
ABDOMINAL PAIN: 0
HEMATURIA: 0
FEVER: 0
CHILLS: 0
HEMATOCHEZIA: 0
DIZZINESS: 0

## 2019-01-31 ASSESSMENT — MIFFLIN-ST. JEOR: SCORE: 1012.67

## 2019-01-31 NOTE — PROGRESS NOTES
Injectable Influenza Immunization Documentation    1.  Is the person to be vaccinated sick today?  Yes    2. Does the person to be vaccinated have an allergy to a component   of the vaccine?   No  Egg Allergy Algorithm Link    3. Has the person to be vaccinated ever had a serious reaction   to influenza vaccine in the past?   No    4. Has the person to be vaccinated ever had Guillain-Barré syndrome?   No    Form completed by Merced Fair CMA  Prior to injection, verified patient identity using patient's name and date of birth.  Due to injection administration, patient instructed to remain in clinic for 15 minutes  afterwards, and to report any adverse reaction to me immediately.    Flu shot TDAP    Drug Amount Wasted:  None.  Vial/Syringe: Single dose vial  Expiration Date:  06/30/2019, 11/02/2020

## 2019-01-31 NOTE — PROGRESS NOTES
SUBJECTIVE:   CC: Jaki Gómez is an 58 year old woman who presents for preventive health visit.     Physical   Annual:     Getting at least 3 servings of Calcium per day:  NO    Bi-annual eye exam:  NO    Dental care twice a year:  Yes    Sleep apnea or symptoms of sleep apnea:  None    Diet:  Regular (no restrictions)    Frequency of exercise:  4-5 days/week    Duration of exercise:  15-30 minutes    Taking medications regularly:  Yes    Additional concerns today:  No    PHQ-2 Total Score: 6          Patient is in for pap, physical, referral for colonoscopy, Dtap, flu shot, would also like to discuss getting back on Zoloft.  Has been on this before for depression.  Has lost weight.  She does not seem to have an appetite.  Her  thinks she needs to be back on it as well.    Today's PHQ-2 Score:   PHQ-2 ( 1999 Pfizer) 1/31/2019   Q1: Little interest or pleasure in doing things 3   Q2: Feeling down, depressed or hopeless 3   PHQ-2 Score 6   Q1: Little interest or pleasure in doing things Nearly every day   Q2: Feeling down, depressed or hopeless Nearly every day   PHQ-2 Score 6       Abuse: Current or Past(Physical, Sexual or Emotional)- No  Do you feel safe in your environment? Yes    Social History     Tobacco Use     Smoking status: Current Every Day Smoker     Packs/day: 0.50     Types: Cigarettes     Smokeless tobacco: Never Used   Substance Use Topics     Alcohol use: No     Alcohol Use 1/31/2019   If you drink alcohol do you typically have greater than 3 drinks per day OR greater than 7 drinks per week? No       Reviewed orders with patient.  Reviewed health maintenance and updated orders accordingly - Yes      Mammogram Screening: Patient over age 50, mutual decision to screen reflected in health maintenance.    Pertinent mammograms are reviewed under the imaging tab.  History of abnormal Pap smear: NO - age 30-65 PAP every 5 years with negative HPV co-testing recommended  PAP / HPV 5/22/2013   PAP  NIL     Reviewed and updated as needed this visit by clinical staff         Reviewed and updated as needed this visit by Provider        Past Medical History:   Diagnosis Date     Lumbago      Multiple sclerosis (H)      Unspecified hypothyroidism     Hypothyroidism      Past Surgical History:   Procedure Laterality Date     ESOPHAGOSCOPY, GASTROSCOPY, DUODENOSCOPY (EGD), COMBINED N/A 2016    Procedure: COMBINED ESOPHAGOSCOPY, GASTROSCOPY, DUODENOSCOPY (EGD);  Surgeon: Walter Rodriguez MD;  Location: [facility]     ESOPHAGOSCOPY, GASTROSCOPY, DUODENOSCOPY (EGD), COMBINED N/A 2/15/2016    Procedure: COMBINED ESOPHAGOSCOPY, GASTROSCOPY, DUODENOSCOPY (EGD);  Surgeon: Narendra Mendoza MD;  Location: [facility]     HC CLOSED TX TIBIAL SHAFT FX W/O MANIPULATION  2003    Closed reduction and long leg cast application of left tib-fib fx.     HC INJ EPIDURAL LUMBAR/SACRAL W/WO CONTRAST  2006    Steroid injection L4-5     HC TREATMENT MISSED  SURG, 1ST TRIMESTER      Suction dilatation and curettage       Review of Systems   Constitutional: Negative for chills and fever.   HENT: Positive for congestion. Negative for ear pain.    Eyes: Negative for pain.   Respiratory: Negative for cough.    Cardiovascular: Negative for chest pain.   Gastrointestinal: Negative for abdominal pain, constipation, diarrhea and hematochezia.   Genitourinary: Negative for frequency, genital sores and hematuria.   Neurological: Negative for dizziness.   Psychiatric/Behavioral: The patient is not nervous/anxious.      CONSTITUTIONAL: NEGATIVE for fever, chills, change in weight  INTEGUMENTARY/SKIN: NEGATIVE for worrisome rashes, moles or lesions  EYES: NEGATIVE for vision changes or irritation  ENT: NEGATIVE for ear, mouth and throat problems  RESP: NEGATIVE for significant cough or SOB  BREAST: NEGATIVE for masses, tenderness or discharge  CV: NEGATIVE for chest pain, palpitations or peripheral edema  GI: NEGATIVE for nausea,  abdominal pain, heartburn, or change in bowel habits  : NEGATIVE for unusual urinary or vaginal symptoms. No vaginal bleeding.  MUSCULOSKELETAL: NEGATIVE for significant arthralgias or myalgia  NEURO: NEGATIVE for weakness, dizziness or paresthesias  PSYCHIATRIC: depressed mood and fatigue      OBJECTIVE:   There were no vitals taken for this visit.  Physical Exam  GENERAL: healthy, alert and no distress  EYES: Eyes grossly normal to inspection, PERRL and conjunctivae and sclerae normal  HENT: ear canals and TM's normal, nose and mouth without ulcers or lesions  NECK: no adenopathy, no asymmetry, masses, or scars and thyroid normal to palpation  RESP: lungs clear to auscultation - no rales, rhonchi or wheezes  BREAST: normal without masses, tenderness or nipple discharge and no palpable axillary masses or adenopathy  CV: regular rate and rhythm, normal S1 S2, no S3 or S4, no murmur, click or rub, no peripheral edema and peripheral pulses strong  ABDOMEN: soft, nontender, no hepatosplenomegaly, no masses and bowel sounds normal   (female): normal female external genitalia, normal urethral meatus, vaginal mucosa pink, moist, well rugated, and normal cervix/adnexa/uterus without masses or discharge   (female): Pap smear done.  MS: no gross musculoskeletal defects noted, no edema  SKIN: no suspicious lesions or rashes  NEURO: Normal strength and tone, mentation intact and speech normal  PSYCH: mentation appears normal, affect normal/bright    Diagnostic Test Results:  Results for orders placed or performed in visit on 01/31/19   Lipid Profile   Result Value Ref Range    Cholesterol 176 <200 mg/dL    Triglycerides 259 (H) <150 mg/dL    HDL Cholesterol 82 >49 mg/dL    LDL Cholesterol Calculated 42 <100 mg/dL    Non HDL Cholesterol 94 <130 mg/dL   Comprehensive metabolic panel   Result Value Ref Range    Sodium 134 133 - 144 mmol/L    Potassium 3.6 3.4 - 5.3 mmol/L    Chloride 98 94 - 109 mmol/L    Carbon Dioxide 30  20 - 32 mmol/L    Anion Gap 6 3 - 14 mmol/L    Glucose 96 70 - 99 mg/dL    Urea Nitrogen 6 (L) 7 - 30 mg/dL    Creatinine 0.62 0.52 - 1.04 mg/dL    GFR Estimate >90 >60 mL/min/[1.73_m2]    GFR Estimate If Black >90 >60 mL/min/[1.73_m2]    Calcium 8.9 8.5 - 10.1 mg/dL    Bilirubin Total 0.3 0.2 - 1.3 mg/dL    Albumin 4.0 3.4 - 5.0 g/dL    Protein Total 7.8 6.8 - 8.8 g/dL    Alkaline Phosphatase 121 40 - 150 U/L    ALT 27 0 - 50 U/L    AST 28 0 - 45 U/L       ASSESSMENT/PLAN:   1. Routine history and physical examination of adult  Generally healthy has had quite a weight loss.  - TDAP VACCINE (ADACEL) [71277.002]  - Pap imaged thin layer screen with HPV - recommended age 30 - 65 years (select HPV order below)    2. Major depressive disorder, recurrent episode, mild (H)  We will get her back on Zoloft 50 mg a day.  Does not feel she needs any counseling at this time.  Recommend she follow-up in a month see how she is doing here.  She has started exercising and seeing chiropractor and is feeling much better with her back.  - sertraline (ZOLOFT) 50 MG tablet; TAKE ONE TABLET BY MOUTH EVERY DAY  Dispense: 30 tablet; Refill: 1    3. Need for vaccination    - TDAP VACCINE (ADACEL) [78951.002]    4. Need for prophylactic vaccination and inoculation against influenza    - FLU VACCINE, (RIV4) RECOMBINANT HA  , IM (FluBlok, egg free) [35350]- >18 YRS ([facility] recommended  50-64 YRS)  - Vaccine Administration, Initial [21788]    5. Special screening for malignant neoplasms, colon    - GASTROENTEROLOGY ADULT REF PROCEDURE ONLY    6. Screening for cardiovascular condition  We will notify her with results.  - Lipid Profile  - Comprehensive metabolic panel    COUNSELING:  Reviewed preventive health counseling, as reflected in patient instructions       Regular exercise       Healthy diet/nutrition       Vision screening    BP Readings from Last 1 Encounters:   05/09/18 122/80     Estimated body mass index is 22.98 kg/m  as calculated  "from the following:    Height as of 5/9/18: 1.585 m (5' 2.4\").    Weight as of 5/9/18: 57.7 kg (127 lb 4.8 oz).           reports that she has been smoking cigarettes.  She has been smoking about 0.50 packs per day. she has never used smokeless tobacco.  Tobacco Cessation Action Plan: Self help information given to patient    Counseling Resources:  ATP IV Guidelines  Pooled Cohorts Equation Calculator  Breast Cancer Risk Calculator  FRAX Risk Assessment  ICSI Preventive Guidelines  Dietary Guidelines for Americans, 2010  [employer]'s MyPlate  ASA Prophylaxis  Lung CA Screening    Narendra Peters MD  [facility] Influenza Immunization Documentation    1.  Is the person to be vaccinated sick today?   No    2. Does the person to be vaccinated have an allergy to a component   of the vaccine?   No  Egg Allergy Algorithm Link    3. Has the person to be vaccinated ever had a serious reaction   to influenza vaccine in the past?   No    4. Has the person to be vaccinated ever had Guillain-Barré syndrome?   No    Form completed by Chaya Castaneda MA         "

## 2019-02-01 ENCOUNTER — TELEPHONE (OUTPATIENT)
Dept: FAMILY MEDICINE | Facility: CLINIC | Age: 59
End: 2019-02-01

## 2019-02-01 ASSESSMENT — PATIENT HEALTH QUESTIONNAIRE - PHQ9: SUM OF ALL RESPONSES TO PHQ QUESTIONS 1-9: 17

## 2019-02-01 ASSESSMENT — ANXIETY QUESTIONNAIRES: GAD7 TOTAL SCORE: 9

## 2019-02-04 DIAGNOSIS — M54.2 CERVICALGIA: ICD-10-CM

## 2019-02-04 LAB
COPATH REPORT: NORMAL
PAP: NORMAL

## 2019-02-04 RX ORDER — TRAMADOL HYDROCHLORIDE 50 MG/1
50 TABLET ORAL EVERY 6 HOURS PRN
Qty: 120 TABLET | Refills: 0 | Status: SHIPPED | OUTPATIENT
Start: 2019-02-04 | End: 2019-03-11

## 2019-02-06 LAB
FINAL DIAGNOSIS: NORMAL
HPV HR 12 DNA CVX QL NAA+PROBE: NEGATIVE
HPV16 DNA SPEC QL NAA+PROBE: NEGATIVE
HPV18 DNA SPEC QL NAA+PROBE: NEGATIVE
SPECIMEN DESCRIPTION: NORMAL
SPECIMEN SOURCE CVX/VAG CYTO: NORMAL

## 2019-02-08 NOTE — RESULT ENCOUNTER NOTE
Chemistry panel was normal including blood sugar.  Cholesterol was good at 176.  Your triglycerides were down to 259 from 354.  This is still high watch fats in your diet.

## 2019-02-11 ENCOUNTER — TELEPHONE (OUTPATIENT)
Dept: FAMILY MEDICINE | Facility: CLINIC | Age: 59
End: 2019-02-11

## 2019-02-11 NOTE — TELEPHONE ENCOUNTER
----- Message from Narendra Peters MD sent at 2/8/2019 11:38 AM CST -----  Chemistry panel was normal including blood sugar.  Cholesterol was good at 176.  Your triglycerides were down to 259 from 354.  This is still high watch fats in your diet.

## 2019-02-11 NOTE — LETTER
[address] [id number] [phone]        February 11, 2019    Jaki Gómez  [address]  [facility] [address]          Dear Jaki,    We were unable to reach you by phone and was unable to leave a message as it said your voicemail is full.      Below are your results of you lab work. Call if you have any questions.       ----- Message from Narendra Peters MD sent at 2/8/2019 11:38 AM CST -----  Chemistry panel was normal including blood sugar.  Cholesterol was good at 176.  Your triglycerides were down to 259 from 354.  This is still high watch fats in your diet.    Sincerely,        Narendra Peters M.D.

## 2019-03-11 DIAGNOSIS — M54.2 CERVICALGIA: ICD-10-CM

## 2019-03-11 RX ORDER — TRAMADOL HYDROCHLORIDE 50 MG/1
50 TABLET ORAL EVERY 6 HOURS PRN
Qty: 120 TABLET | Refills: 0 | Status: SHIPPED | OUTPATIENT
Start: 2019-03-11 | End: 2019-04-03

## 2019-03-11 NOTE — TELEPHONE ENCOUNTER
Tramadol 50 MG       Last Written Prescription Date:  2/4/19  Last Fill Quantity: 120,   # refills: 0  Last Office Visit: 1/31/19  Future Office visit:       Routing refill request to provider for review/approval because:  Drug not on the [facility], [facility] or [facility] refill protocol or controlled substance

## 2019-04-01 ENCOUNTER — THERAPY VISIT (OUTPATIENT)
Dept: CHIROPRACTIC MEDICINE | Facility: CLINIC | Age: 59
End: 2019-04-01
Payer: COMMERCIAL

## 2019-04-01 DIAGNOSIS — M99.01 SEGMENTAL DYSFUNCTION OF CERVICAL REGION: Primary | ICD-10-CM

## 2019-04-01 DIAGNOSIS — M54.2 CERVICALGIA: ICD-10-CM

## 2019-04-01 DIAGNOSIS — M99.03 SEGMENTAL DYSFUNCTION OF LUMBAR REGION: ICD-10-CM

## 2019-04-01 DIAGNOSIS — M54.12 BRACHIAL NEURITIS OR RADICULITIS: ICD-10-CM

## 2019-04-01 DIAGNOSIS — F33.0 MAJOR DEPRESSIVE DISORDER, RECURRENT EPISODE, MILD (H): ICD-10-CM

## 2019-04-01 DIAGNOSIS — M99.02 SEGMENTAL DYSFUNCTION OF THORACIC REGION: ICD-10-CM

## 2019-04-01 PROCEDURE — 98941 CHIROPRACT MANJ 3-4 REGIONS: CPT | Mod: AT | Performed by: CHIROPRACTOR

## 2019-04-01 NOTE — PROGRESS NOTES
"5/18/2018 Updated Lumbar MRI - IMPRESSION: I see no definite change since an MRI on 7/26/2016. There  are mild degenerative changes in the lower lumbar spine resulting in  mild central canal stenosis at L4-L5. No disc herniation or other site  of stenosis is seen.    Visit #:  9 of 12    Subjective:  Jaki Gómez is a 58 year old female who is seen in f/u up for:        Segmental dysfunction of cervical region  Segmental dysfunction of lumbar region  Brachial neuritis or radiculitis  Cervicalgia  Segmental dysfunction of thoracic region.     Since last visit on 1/21/2019,  Jaki Gómez reports: that she needs to be \"cranked out.\" She has been working out and more active, and is really feeling good. Her neck is pretty bad today, rated 7/10, but she states that she will be good \"in about 20 min.\"      ADL's : ADLs have been difficult with exacerbation of pain.          Objective:  The following was observed:    P: pain elicited on palpation, bilateral upper back   A: static palpation demonstrates intersegmental asymmetry, as noted  R: motion palpation notes restricted motion  T: hypertonicity at:  traps bilaterally      Segmental spinal dysfunction/restrictions found at:  C1 RR, LRR  C5 LR, RRR  T1 RR, LRR  T3 LR, RRR  T7 E, FR   Right SI posterior        Assessment:    Diagnoses:      1. Segmental dysfunction of cervical region    2. Segmental dysfunction of lumbar region    3. Brachial neuritis or radiculitis    4. Cervicalgia    5. Segmental dysfunction of thoracic region        Patient's condition:  Patient had restrictions pre-manipulation and Patient had decreased motion prior to manipulation    Treatment effectiveness:  Post manipulation there is better intersegmental movement and Patient claims to feel looser post manipulation      Procedures:  CMT:  24086 Chiropractic manipulative treatment 3-4 regions performed   Cervical: Deiversified, C1, C5, Supine  Thoracic: Diversified, T1, T3, T7, Prone   Pelvis: " Drop assist, Right SI, Prone    Modalities:  MSTM to affected musculature      Therapeutic procedures:  Gave ice instructions post-adjustment, as needed.      Prognosis: Good    Progress towards Goals: Patient has been improving with care but had a recent flare-up of pain with flinging birdseed.    Recommendations:    Instructions:ice 20 minutes every other hour as needed and stretch as instructed at visit     Follow-up:  Return to care as needed.

## 2019-04-02 NOTE — TELEPHONE ENCOUNTER
"sertraline  Last Written Prescription Date:  1/31/2019  Last Fill Quantity: 30,  # refills: 1   Last office visit: 1/31/2019 with prescribing provider:      Future Office Visit:      Requested Prescriptions   Pending Prescriptions Disp Refills     sertraline (ZOLOFT) 50 MG tablet [Pharmacy Med Name: SERTRALINE HCL 50MG TABS] 30 tablet 1     Sig: TAKE ONE TABLET BY MOUTH ONCE DAILY    SSRIs Protocol Failed - 4/1/2019 10:22 AM       Failed - PHQ-9 score less than 5 in past 6 months    Please review last PHQ-9 score.   PHQ-9 SCORE 10/13/2017 4/19/2018 1/31/2019   PHQ-9 Total Score - - -   PHQ-9 Total Score MyChart - - 17 (Moderately severe depression)   PHQ-9 Total Score 18 17 17            Passed - Medication is active on med list       Passed - Patient is age 18 or older       Passed - No active pregnancy on record       Passed - No positive pregnancy test in last 12 months       Passed - Recent (6 mo) or future (30 days) visit within the authorizing provider's specialty    Patient had office visit in the last 6 months or has a visit in the next 30 days with authorizing provider or within the authorizing provider's specialty.  See \"Patient Info\" tab in inbasket, or \"Choose Columns\" in Meds & Orders section of the refill encounter.              Routing refill request to provider for review/approval because:  Unable to fill per RN protocol due to PHQ-9 >5.    Ghada Peters RN on 4/2/2019 at 4:22 PM    "

## 2019-04-03 DIAGNOSIS — M54.2 CERVICALGIA: ICD-10-CM

## 2019-04-03 RX ORDER — TRAMADOL HYDROCHLORIDE 50 MG/1
50 TABLET ORAL EVERY 6 HOURS PRN
Qty: 120 TABLET | Refills: 0 | Status: SHIPPED | OUTPATIENT
Start: 2019-04-03 | End: 2019-05-01

## 2019-04-03 NOTE — TELEPHONE ENCOUNTER
Tramadol 50 MG       Last Written Prescription Date:  3/11/19  Last Fill Quantity: 120,   # refills: 0  Last Office Visit: 1/31/19  Future Office visit:       Routing refill request to provider for review/approval because:  Drug not on the [facility], [facility] or [facility] refill protocol or controlled substance

## 2019-04-18 DIAGNOSIS — E03.9 HYPOTHYROIDISM, UNSPECIFIED TYPE: ICD-10-CM

## 2019-04-22 RX ORDER — LEVOTHYROXINE SODIUM 100 UG/1
TABLET ORAL
Qty: 90 TABLET | Refills: 1 | Status: SHIPPED | OUTPATIENT
Start: 2019-04-22 | End: 2019-12-18

## 2019-04-22 NOTE — TELEPHONE ENCOUNTER
"Prescription approved per RN refill protocol.  Danay Carter RN, BSN      synthroid  Last Written Prescription Date:  8/20/2018  Last Fill Quantity: 90,  # refills: 1   Last office visit: 1/31/2019 with prescribing provider:  sweta   Future Office Visit:      Requested Prescriptions   Pending Prescriptions Disp Refills     levothyroxine (SYNTHROID/LEVOTHROID) 100 MCG tablet [Pharmacy Med Name: LEVOTHYROXINE 100MCG TAB] 90 tablet 1     Sig: TAKE ONE TABLET BY MOUTH EVERY DAY       Thyroid Protocol Passed - 4/18/2019  4:04 PM        Passed - Patient is 12 years or older        Passed - Recent (12 mo) or future (30 days) visit within the authorizing provider's specialty     Patient had office visit in the last 12 months or has a visit in the next 30 days with authorizing provider or within the authorizing provider's specialty.  See \"Patient Info\" tab in inbasket, or \"Choose Columns\" in Meds & Orders section of the refill encounter.              Passed - Medication is active on med list        Passed - Normal TSH on file in past 12 months     Recent Labs   Lab Test 12/10/18  1412   TSH 1.15              Passed - No active pregnancy on record     If patient is pregnant or has had a positive pregnancy test, please check TSH.          Passed - No positive pregnancy test in past 12 months     If patient is pregnant or has had a positive pregnancy test, please check TSH.            Danay Carter RN on 4/22/2019 at 8:38 AM    "

## 2019-04-24 DIAGNOSIS — G35 MULTIPLE SCLEROSIS (H): ICD-10-CM

## 2019-04-26 NOTE — TELEPHONE ENCOUNTER
Requested Prescriptions   Pending Prescriptions Disp Refills     gabapentin (NEURONTIN) 300 MG capsule [Pharmacy Med Name: GABAPENTIN 300MG CAPS] 90 capsule 11     Sig: TAKE ONE CAPSULE BY MOUTH THREE TIMES A DAY       There is no refill protocol information for this order      Last Written Prescription Date:  4/18/18  Last Fill Quantity: 90,  # refills: 11   Last office visit: 1/31/2019 with prescribing provider:     Future Office Visit:      Routing refill request to provider for review/approval because:  Drug not on the [facility] refill protocol     Raquel Ballesteros RN

## 2019-04-29 ENCOUNTER — THERAPY VISIT (OUTPATIENT)
Dept: CHIROPRACTIC MEDICINE | Facility: CLINIC | Age: 59
End: 2019-04-29
Payer: COMMERCIAL

## 2019-04-29 DIAGNOSIS — M99.01 SEGMENTAL DYSFUNCTION OF CERVICAL REGION: Primary | ICD-10-CM

## 2019-04-29 DIAGNOSIS — M54.12 BRACHIAL NEURITIS OR RADICULITIS: ICD-10-CM

## 2019-04-29 DIAGNOSIS — M99.02 SEGMENTAL DYSFUNCTION OF THORACIC REGION: ICD-10-CM

## 2019-04-29 DIAGNOSIS — M99.03 SEGMENTAL DYSFUNCTION OF LUMBAR REGION: ICD-10-CM

## 2019-04-29 DIAGNOSIS — M54.2 CERVICALGIA: ICD-10-CM

## 2019-04-29 PROCEDURE — 98941 CHIROPRACT MANJ 3-4 REGIONS: CPT | Mod: AT | Performed by: CHIROPRACTOR

## 2019-04-29 RX ORDER — GABAPENTIN 300 MG/1
CAPSULE ORAL
Qty: 90 CAPSULE | Refills: 11 | Status: SHIPPED | OUTPATIENT
Start: 2019-04-29 | End: 2020-09-28

## 2019-04-29 NOTE — PROGRESS NOTES
"5/18/2018 Updated Lumbar MRI - IMPRESSION: I see no definite change since an MRI on 7/26/2016. There  are mild degenerative changes in the lower lumbar spine resulting in  mild central canal stenosis at L4-L5. No disc herniation or other site  of stenosis is seen.    Visit #:  10 of 12    Subjective:  Jaik Gómez is a 58 year old female who is seen in f/u up for:        Segmental dysfunction of cervical region  Segmental dysfunction of lumbar region  Brachial neuritis or radiculitis  Cervicalgia  Segmental dysfunction of thoracic region.     Since last visit on 4/1/2019,  Jaki Gómez reports: that she is having neck and back pain. She saw someone else who was \"not able to crack her neck.\" She states that she has had a hard time getting in for an appt, although she cancelled last week. She has been doing a lot of yardwork lately, and is just feeling pain. She has to clean her house today.      ADL's : ADLs have been difficult with exacerbation of pain.          Objective:  The following was observed:    P: pain elicited on palpation, bilateral upper back   A: static palpation demonstrates intersegmental asymmetry, as noted  R: motion palpation notes restricted motion  T: hypertonicity at:  traps bilaterally      Segmental spinal dysfunction/restrictions found at:  C1 RR, LRR  C5 LR, RRR  T1 RR, LRR  T3 LR, RRR  T7 E, FR   Right SI posterior        Assessment:    Diagnoses:      1. Segmental dysfunction of cervical region    2. Segmental dysfunction of lumbar region    3. Brachial neuritis or radiculitis    4. Cervicalgia    5. Segmental dysfunction of thoracic region        Patient's condition:  Patient had restrictions pre-manipulation and Patient had decreased motion prior to manipulation    Treatment effectiveness:  Post manipulation there is better intersegmental movement and Patient claims to feel looser post manipulation      Procedures:  CMT:  38336 Chiropractic manipulative treatment 3-4 regions " performed   Cervical: Deiversified, C1, C5, Supine  Thoracic: Diversified, T1, T3, T7, Prone   Pelvis: Drop assist, Right SI, Prone    Modalities:  MSTM to affected musculature      Therapeutic procedures:  Gave ice instructions post-adjustment, as needed.      Prognosis: Good    Progress towards Goals: Patient has been improving with care but had a recent flare-up of pain with flinging birdseed.    Recommendations:    Instructions:ice 20 minutes every other hour as needed and stretch as instructed at visit     Follow-up:  Return to care as needed.

## 2019-05-01 DIAGNOSIS — M54.2 CERVICALGIA: ICD-10-CM

## 2019-05-03 RX ORDER — TRAMADOL HYDROCHLORIDE 50 MG/1
50 TABLET ORAL EVERY 6 HOURS PRN
Qty: 120 TABLET | Refills: 0 | Status: SHIPPED | OUTPATIENT
Start: 2019-05-03 | End: 2019-05-28

## 2019-05-03 NOTE — TELEPHONE ENCOUNTER
Requested Prescriptions   Pending Prescriptions Disp Refills     traMADol (ULTRAM) 50 MG tablet 120 tablet 0     Sig: Take 1 tablet (50 mg) by mouth every 6 hours as needed       There is no refill protocol information for this order        Last Written Prescription Date:  4/3/19  Last Fill Quantity: 120,  # refills: 0   Last office visit: 1/31/2019 with prescribing provider:     Future Office Visit:      Routing refill request to provider for review/approval because:  Drug not on the [facility] refill protocol     Raquel Ballesteros RN

## 2019-05-08 ENCOUNTER — THERAPY VISIT (OUTPATIENT)
Dept: CHIROPRACTIC MEDICINE | Facility: CLINIC | Age: 59
End: 2019-05-08
Payer: COMMERCIAL

## 2019-05-08 DIAGNOSIS — M99.02 SEGMENTAL DYSFUNCTION OF THORACIC REGION: ICD-10-CM

## 2019-05-08 DIAGNOSIS — M99.01 SEGMENTAL DYSFUNCTION OF CERVICAL REGION: Primary | ICD-10-CM

## 2019-05-08 DIAGNOSIS — M54.12 BRACHIAL NEURITIS OR RADICULITIS: ICD-10-CM

## 2019-05-08 DIAGNOSIS — M99.03 SEGMENTAL DYSFUNCTION OF LUMBAR REGION: ICD-10-CM

## 2019-05-08 DIAGNOSIS — M54.2 CERVICALGIA: ICD-10-CM

## 2019-05-08 PROCEDURE — 98941 CHIROPRACT MANJ 3-4 REGIONS: CPT | Mod: AT | Performed by: CHIROPRACTOR

## 2019-05-08 NOTE — PROGRESS NOTES
"5/18/2018 Updated Lumbar MRI - IMPRESSION: I see no definite change since an MRI on 7/26/2016. There  are mild degenerative changes in the lower lumbar spine resulting in  mild central canal stenosis at L4-L5. No disc herniation or other site  of stenosis is seen.    Visit #:  11 of 12    Subjective:  Jaki Gómez is a 58 year old female who is seen in f/u up for:        Segmental dysfunction of cervical region  Segmental dysfunction of lumbar region  Brachial neuritis or radiculitis  Cervicalgia  Segmental dysfunction of thoracic region.     Since last visit on 4/29/2019,  Jaki Gómez reports: that she is still in 8/10 pain. She did get 2 days of relief, but it never improved past that point. She has been stretching, but it just won't resolve. Her pain is on the right side of her neck. She states that she cracks all the time when she stretches. She is \"so happy there was a spot open today.\"    ADL's : ADLs have been difficult with exacerbation of pain.          Objective:  The following was observed:    P: pain elicited on palpation, bilateral upper back   A: static palpation demonstrates intersegmental asymmetry, as noted  R: motion palpation notes restricted motion  T: hypertonicity at:  traps bilaterally      Segmental spinal dysfunction/restrictions found at:  C1 RR, LRR  C5 LR, RRR  T1 RR, LRR  T3 LR, RRR  T7 E, FR   T10 E, FR  Right SI posterior        Assessment:    Diagnoses:      1. Segmental dysfunction of cervical region    2. Segmental dysfunction of lumbar region    3. Brachial neuritis or radiculitis    4. Cervicalgia    5. Segmental dysfunction of thoracic region        Patient's condition:  Patient had restrictions pre-manipulation and Patient had decreased motion prior to manipulation    Treatment effectiveness:  Post manipulation there is better intersegmental movement and Patient claims to feel looser post manipulation      Procedures:  CMT:  57794 Chiropractic manipulative treatment 3-4 " regions performed   Cervical: Deiversified, C1, C5, Supine  Thoracic: Diversified, T1, T3, T7, T10, Prone   Pelvis: Drop assist, Right SI, Prone    Modalities:  MSTM to affected musculature      Therapeutic procedures:  Gave ice instructions post-adjustment, as needed.      Prognosis: Good    Progress towards Goals: Patient has been improving with care but had a recent flare-up of pain with flinging birdseed.    Recommendations:    Instructions:ice 20 minutes every other hour as needed and stretch as instructed at visit     Follow-up:  Return to care as needed. Patient seems much improved after the adjustment.

## 2019-05-28 DIAGNOSIS — M54.2 CERVICALGIA: ICD-10-CM

## 2019-05-30 RX ORDER — TRAMADOL HYDROCHLORIDE 50 MG/1
50 TABLET ORAL EVERY 6 HOURS PRN
Qty: 120 TABLET | Refills: 0 | Status: SHIPPED | OUTPATIENT
Start: 2019-05-30 | End: 2019-06-25

## 2019-05-30 NOTE — TELEPHONE ENCOUNTER
Requested Prescriptions   Pending Prescriptions Disp Refills     traMADol (ULTRAM) 50 MG tablet 120 tablet 0     Sig: Take 1 tablet (50 mg) by mouth every 6 hours as needed       There is no refill protocol information for this order        Last Written Prescription Date:  5/3/2019  Last Fill Quantity: 120,  # refills: 0   Last office visit: 1/31/2019 with prescribing provider:     Future Office Visit:      Routing refill request to provider for review/approval because:  Drug not on the [facility] [place]     Raquel Ballesteros RN

## 2019-05-31 ENCOUNTER — THERAPY VISIT (OUTPATIENT)
Dept: CHIROPRACTIC MEDICINE | Facility: CLINIC | Age: 59
End: 2019-05-31
Payer: COMMERCIAL

## 2019-05-31 DIAGNOSIS — M99.02 SEGMENTAL DYSFUNCTION OF THORACIC REGION: ICD-10-CM

## 2019-05-31 DIAGNOSIS — M99.01 SEGMENTAL DYSFUNCTION OF CERVICAL REGION: Primary | ICD-10-CM

## 2019-05-31 DIAGNOSIS — M54.2 CERVICALGIA: ICD-10-CM

## 2019-05-31 DIAGNOSIS — M54.12 BRACHIAL NEURITIS OR RADICULITIS: ICD-10-CM

## 2019-05-31 DIAGNOSIS — M99.03 SEGMENTAL DYSFUNCTION OF LUMBAR REGION: ICD-10-CM

## 2019-05-31 PROCEDURE — 98941 CHIROPRACT MANJ 3-4 REGIONS: CPT | Mod: AT | Performed by: CHIROPRACTOR

## 2019-05-31 NOTE — PROGRESS NOTES
5/18/2018 Updated Lumbar MRI - IMPRESSION: I see no definite change since an MRI on 7/26/2016. There  are mild degenerative changes in the lower lumbar spine resulting in  mild central canal stenosis at L4-L5. No disc herniation or other site  of stenosis is seen.    Visit #:  12 of 12    Subjective:  aJki Gómez is a 58 year old female who is seen in f/u up for:        Segmental dysfunction of cervical region  Segmental dysfunction of lumbar region  Brachial neuritis or radiculitis  Cervicalgia  Segmental dysfunction of thoracic region.     Since last visit on 5/8/2019,  Jaki Gómez reports: that she is in a lot pain in her neck today, rated 9/10. She states that she doesn't remember her pain being this bad ever. She points to pain on the right side from sanding. She states that she has needed an adjustment for a week.         ADL's : ADLs have been difficult with exacerbation of pain.          Objective:  The following was observed:    P: pain elicited on palpation, bilateral upper back   A: static palpation demonstrates intersegmental asymmetry, as noted  R: motion palpation notes restricted motion  T: hypertonicity at:  traps bilaterally      Segmental spinal dysfunction/restrictions found at:  C1 RR, LRR  C5 RR, LRR  T1 RR, LRR  T5 E, FR   T10 E, FR  Right SI posterior        Assessment:    Diagnoses:      1. Segmental dysfunction of cervical region    2. Segmental dysfunction of lumbar region    3. Brachial neuritis or radiculitis    4. Cervicalgia    5. Segmental dysfunction of thoracic region        Patient's condition:  Patient had restrictions pre-manipulation and Patient had decreased motion prior to manipulation    Treatment effectiveness:  Post manipulation there is better intersegmental movement and Patient claims to feel looser post manipulation      Procedures:  CMT:  03313 Chiropractic manipulative treatment 3-4 regions performed   Cervical: Deiversified, C1, C5, Supine  Thoracic: Diversified,  T1, T5, T10, Prone   Pelvis: Drop assist, Right SI, Prone    Modalities:  MSTM to affected musculature      Therapeutic procedures:  Gave ice instructions post-adjustment, as needed.      Prognosis: Good    Progress towards Goals: Patient has been improving with care but had a recent flare-up of pain with flinging birdseed.    Recommendations:    Instructions:ice 20 minutes every other hour as needed and stretch as instructed at visit     Follow-up:  Return to care in 4 weeks. Patient felt much improved when leaving today.

## 2019-06-10 ENCOUNTER — TELEPHONE (OUTPATIENT)
Dept: FAMILY MEDICINE | Facility: CLINIC | Age: 59
End: 2019-06-10

## 2019-06-14 NOTE — TELEPHONE ENCOUNTER
I have attempted to call the pt to update a PHQ-9. Mailbox was full so I was unable to leave a message. I will try back another time. Rachell Pérez CMA ([facility])

## 2019-06-17 ASSESSMENT — PATIENT HEALTH QUESTIONNAIRE - PHQ9: SUM OF ALL RESPONSES TO PHQ QUESTIONS 1-9: 7

## 2019-06-17 NOTE — TELEPHONE ENCOUNTER
Pt completed PHQ-9.    PHQ-9 SCORE 6/17/2019   PHQ-9 Total Score -   PHQ-9 Total Score MyChart -   PHQ-9 Total Score 7     Rachell Pérez CMA ([facility])

## 2019-06-21 ENCOUNTER — THERAPY VISIT (OUTPATIENT)
Dept: CHIROPRACTIC MEDICINE | Facility: CLINIC | Age: 59
End: 2019-06-21
Payer: COMMERCIAL

## 2019-06-21 DIAGNOSIS — M99.02 SEGMENTAL DYSFUNCTION OF THORACIC REGION: ICD-10-CM

## 2019-06-21 DIAGNOSIS — M54.12 BRACHIAL NEURITIS OR RADICULITIS: ICD-10-CM

## 2019-06-21 DIAGNOSIS — M54.2 CERVICALGIA: ICD-10-CM

## 2019-06-21 DIAGNOSIS — M99.03 SEGMENTAL DYSFUNCTION OF LUMBAR REGION: ICD-10-CM

## 2019-06-21 DIAGNOSIS — M99.01 SEGMENTAL DYSFUNCTION OF CERVICAL REGION: Primary | ICD-10-CM

## 2019-06-21 PROCEDURE — 98941 CHIROPRACT MANJ 3-4 REGIONS: CPT | Mod: AT | Performed by: CHIROPRACTOR

## 2019-06-21 PROCEDURE — 99212 OFFICE O/P EST SF 10 MIN: CPT | Mod: 25 | Performed by: CHIROPRACTOR

## 2019-06-21 NOTE — PROGRESS NOTES
"5/18/2018 Updated Lumbar MRI - IMPRESSION: I see no definite change since an MRI on 7/26/2016. There  are mild degenerative changes in the lower lumbar spine resulting in  mild central canal stenosis at L4-L5. No disc herniation or other site  of stenosis is seen.    Visit #:  1 of 12    Subjective:  Jaki Gómez is a 58 year old female who is seen in f/u up for:        Segmental dysfunction of cervical region  Segmental dysfunction of lumbar region  Brachial neuritis or radiculitis  Cervicalgia  Segmental dysfunction of thoracic region.     Since last visit on 5/31/2019,  Jaki Gómez reports: that she took a bad fall off her front stairs last week. She banged up her left arm and wrist. She feels like there is a tendon that is \"in trouble\" and has a brace on it. She feels so \"off\" without being able to use her left arm. She is depressed without her left arm. Her back and neck are hurting, and she has been using her right arm now. She feels more pain on her right side today. She rates her pain 8/10 and \"feels all out of whack.\" She does feel like she is improving.       ADL's : ADLs have been difficult with exacerbation of pain.          Objective:  The following was observed:    CAROM: RLF and LLF moderately reduced with pain, RR and LR mildly reduced with pain      P: pain elicited on palpation, bilateral upper back   A: static palpation demonstrates intersegmental asymmetry, as noted  R: motion palpation notes restricted motion  T: hypertonicity at:  traps bilaterally      Segmental spinal dysfunction/restrictions found at:  C1 RR, LRR  C5 LR, RRR  T1 RR, LRR  T5 E, FR   T10 E, FR  Right SI posterior        Assessment:    Diagnoses:      1. Segmental dysfunction of cervical region    2. Segmental dysfunction of lumbar region    3. Brachial neuritis or radiculitis    4. Cervicalgia    5. Segmental dysfunction of thoracic region        Patient's condition:  Patient had restrictions pre-manipulation and Patient " had decreased motion prior to manipulation    Treatment effectiveness:  Post manipulation there is better intersegmental movement and Patient claims to feel looser post manipulation      Procedures:  Level 2 re-exam with recent fall and exacerbation of pain   CMT:  47334 Chiropractic manipulative treatment 3-4 regions performed   Cervical: Deiversified, C1, C5, Supine  Thoracic: Diversified, T1, T5, T10, Prone   Pelvis: Drop assist, Right SI, Prone    Modalities:  MSTM to affected musculature      Therapeutic procedures:  Gave ice instructions post-adjustment, as needed.      Prognosis: Good    Progress towards Goals: Patient has been improving with care but had a recent flare-up of pain with falling.     Recommendations:    Instructions:ice 20 minutes every other hour as needed and stretch as instructed at visit     Follow-up:  Return to care PRN. Patient felt much improved when leaving today.

## 2019-06-25 DIAGNOSIS — M54.2 CERVICALGIA: ICD-10-CM

## 2019-06-27 RX ORDER — TRAMADOL HYDROCHLORIDE 50 MG/1
50 TABLET ORAL EVERY 6 HOURS PRN
Qty: 120 TABLET | Refills: 0 | Status: SHIPPED | OUTPATIENT
Start: 2019-06-27 | End: 2019-07-25

## 2019-06-27 NOTE — TELEPHONE ENCOUNTER
ULTRAM  Last Written Prescription Date:  5/30/19  Last Fill Quantity: 120,  # refills: 0   Last office visit: 1/31/2019 with prescribing provider:     Future Office Visit:  NONE  DX: Cervicalgia  Routing refill request to provider for review/approval because:  Drug not on the [facility] refill protocol   MIMA Molina

## 2019-07-08 ENCOUNTER — TRANSFERRED RECORDS (OUTPATIENT)
Dept: HEALTH INFORMATION MANAGEMENT | Facility: CLINIC | Age: 59
End: 2019-07-08

## 2019-07-25 DIAGNOSIS — M54.2 CERVICALGIA: ICD-10-CM

## 2019-07-26 NOTE — TELEPHONE ENCOUNTER
Requested Prescriptions   Pending Prescriptions Disp Refills     traMADol (ULTRAM) 50 MG tablet 120 tablet 0     Sig: Take 1 tablet (50 mg) by mouth every 6 hours as needed       There is no refill protocol information for this order        Last Written Prescription Date:  6/27/2019  Last Fill Quantity: 120,  # refills: 0   Last office visit: 1/31/2019 with prescribing provider:     Future Office Visit:      Cervicalgia [M54.2]   Routing refill request to provider for review/approval because:  Drug not on the [facility] refill protocol       Raquel Ballesteros RN

## 2019-07-29 RX ORDER — TRAMADOL HYDROCHLORIDE 50 MG/1
50 TABLET ORAL EVERY 6 HOURS PRN
Qty: 120 TABLET | Refills: 0 | Status: SHIPPED | OUTPATIENT
Start: 2019-07-29 | End: 2019-08-21

## 2019-07-30 ENCOUNTER — TELEPHONE (OUTPATIENT)
Dept: FAMILY MEDICINE | Facility: CLINIC | Age: 59
End: 2019-07-30

## 2019-08-05 ENCOUNTER — THERAPY VISIT (OUTPATIENT)
Dept: CHIROPRACTIC MEDICINE | Facility: CLINIC | Age: 59
End: 2019-08-05
Payer: COMMERCIAL

## 2019-08-05 DIAGNOSIS — M54.12 BRACHIAL NEURITIS OR RADICULITIS: ICD-10-CM

## 2019-08-05 DIAGNOSIS — M99.01 SEGMENTAL DYSFUNCTION OF CERVICAL REGION: ICD-10-CM

## 2019-08-05 DIAGNOSIS — M54.2 CERVICALGIA: ICD-10-CM

## 2019-08-05 DIAGNOSIS — M99.03 SEGMENTAL DYSFUNCTION OF LUMBAR REGION: Primary | ICD-10-CM

## 2019-08-05 DIAGNOSIS — M99.02 SEGMENTAL DYSFUNCTION OF THORACIC REGION: ICD-10-CM

## 2019-08-05 PROCEDURE — 98941 CHIROPRACT MANJ 3-4 REGIONS: CPT | Mod: AT | Performed by: CHIROPRACTOR

## 2019-08-05 NOTE — PROGRESS NOTES
"5/18/2018 Updated Lumbar MRI - IMPRESSION: I see no definite change since an MRI on 7/26/2016. There  are mild degenerative changes in the lower lumbar spine resulting in  mild central canal stenosis at L4-L5. No disc herniation or other site  of stenosis is seen.    Visit #:   of 12    Subjective:  Jaki Gómez is a 58 year old female who is seen in f/u up for:        Segmental dysfunction of cervical region  Segmental dysfunction of lumbar region  Brachial neuritis or radiculitis  Cervicalgia  Segmental dysfunction of thoracic region.     Since last visit on 6/21/2019,  Jaki Gómez reports: that she is \"all broken.\" She states that she has fallen about 6 times this summer. She broke her arm twice. Everything hurts, she rates her pain 8/10 today, \"everywhere.\" She sprained her ankle and has been walking differently. Her balance has \"not been good.\" She states that she has been looking forward to this visit like Andrea.       ADL's : ADLs have been difficult with exacerbation of pain.          Objective:  The following was observed:      P: pain elicited on palpation, bilateral upper back   A: static palpation demonstrates intersegmental asymmetry, as noted  R: motion palpation notes restricted motion  T: hypertonicity at:  traps bilaterally      Segmental spinal dysfunction/restrictions found at:  C2 LR, RRR  T1 RR, LRR  T5 E, FR   T10 E, FR  Right SI posterior        Assessment:    Diagnoses:      1. Segmental dysfunction of cervical region    2. Segmental dysfunction of lumbar region    3. Brachial neuritis or radiculitis    4. Cervicalgia    5. Segmental dysfunction of thoracic region        Patient's condition:  Patient had restrictions pre-manipulation and Patient had decreased motion prior to manipulation    Treatment effectiveness:  Post manipulation there is better intersegmental movement and Patient claims to feel looser post manipulation      Procedures:  CMT:  89542 Chiropractic manipulative " treatment 3-4 regions performed   Cervical: Deiversified, C2, Supine  Thoracic: Diversified, T1, T5, T10, Prone   Pelvis: Drop assist, Right SI, Prone    Modalities:  MSTM to affected musculature      Therapeutic procedures:  Gave ice instructions post-adjustment, as needed.      Prognosis: Good    Progress towards Goals: Patient has been improving with care but had a recent flare-up of pain with falling.     Recommendations:    Instructions:ice 20 minutes every other hour as needed and stretch as instructed at visit     Follow-up:  Return to care in 1 week.

## 2019-08-12 ENCOUNTER — THERAPY VISIT (OUTPATIENT)
Dept: CHIROPRACTIC MEDICINE | Facility: CLINIC | Age: 59
End: 2019-08-12
Payer: COMMERCIAL

## 2019-08-12 DIAGNOSIS — M54.2 CERVICALGIA: ICD-10-CM

## 2019-08-12 DIAGNOSIS — M54.12 BRACHIAL NEURITIS OR RADICULITIS: ICD-10-CM

## 2019-08-12 DIAGNOSIS — M99.02 SEGMENTAL DYSFUNCTION OF THORACIC REGION: ICD-10-CM

## 2019-08-12 DIAGNOSIS — M99.01 SEGMENTAL DYSFUNCTION OF CERVICAL REGION: Primary | ICD-10-CM

## 2019-08-12 DIAGNOSIS — M99.03 SEGMENTAL DYSFUNCTION OF LUMBAR REGION: ICD-10-CM

## 2019-08-12 PROCEDURE — 98941 CHIROPRACT MANJ 3-4 REGIONS: CPT | Mod: AT | Performed by: CHIROPRACTOR

## 2019-08-12 NOTE — PROGRESS NOTES
"5/18/2018 Updated Lumbar MRI - IMPRESSION: I see no definite change since an MRI on 7/26/2016. There  are mild degenerative changes in the lower lumbar spine resulting in  mild central canal stenosis at L4-L5. No disc herniation or other site  of stenosis is seen.    Visit #:  2 of 12    Subjective:  Jaki Gómez is a 58 year old female who is seen in f/u up for:        Segmental dysfunction of cervical region  Segmental dysfunction of lumbar region  Brachial neuritis or radiculitis  Cervicalgia  Segmental dysfunction of thoracic region.     Since last visit on 8/5/2019,  Jaki Gómez reports: that she is \"all broken.\" She states that she got some relief from her last treatment, but she feels the same as when she came last week. Her CAROM is decreased and she feels like she cannot turn her head. She rates her current pain 8/10. Her pain is bilateral, but the left side is worst. She feels like she is sleeping wrong.       ADL's : ADLs have been difficult with exacerbation of pain.          Objective:  The following was observed:      P: pain elicited on palpation, bilateral upper back   A: static palpation demonstrates intersegmental asymmetry, as noted  R: motion palpation notes restricted motion  T: hypertonicity at:  traps bilaterally      Segmental spinal dysfunction/restrictions found at:  C2 RR, LRR  T1 RR, LRR  T5 E, FR   T10 E, FR  Right SI posterior        Assessment:    Diagnoses:      1. Segmental dysfunction of cervical region    2. Segmental dysfunction of lumbar region    3. Brachial neuritis or radiculitis    4. Cervicalgia    5. Segmental dysfunction of thoracic region        Patient's condition:  Patient had restrictions pre-manipulation and Patient had decreased motion prior to manipulation    Treatment effectiveness:  Post manipulation there is better intersegmental movement and Patient claims to feel looser post manipulation      Procedures:  CMT:  10686 Chiropractic manipulative treatment 3-4 " regions performed   Cervical: Deiversified, C2, Supine  Thoracic: Diversified, T1, T5, T10, Prone   Pelvis: Drop assist, Right SI, Prone    Modalities:  MSTM to affected musculature      Therapeutic procedures:  Gave ice instructions post-adjustment, as needed.      Prognosis: Good    Progress towards Goals: Patient has been improving with care but had a recent flare-up of pain with falling.     Recommendations:    Instructions:ice 20 minutes every other hour as needed and stretch as instructed at visit     Follow-up:  Return to care in 1 week.

## 2019-08-21 DIAGNOSIS — M54.2 CERVICALGIA: ICD-10-CM

## 2019-08-21 RX ORDER — TRAMADOL HYDROCHLORIDE 50 MG/1
50 TABLET ORAL EVERY 6 HOURS PRN
Qty: 120 TABLET | Refills: 0 | Status: SHIPPED | OUTPATIENT
Start: 2019-08-21 | End: 2019-09-24

## 2019-08-21 NOTE — TELEPHONE ENCOUNTER
tramadol  Last Written Prescription Date:  7/29/2019  Last Fill Quantity: 120,  # refills: 0   Last office visit: 1/31/2019 with prescribing provider:      Future Office Visit:      Requested Prescriptions   Pending Prescriptions Disp Refills     traMADol (ULTRAM) 50 MG tablet 120 tablet 0     Sig: Take 1 tablet (50 mg) by mouth every 6 hours as needed       There is no refill protocol information for this order          Routing refill request to provider for review/approval because:  Drug not [place] [facility] [place]           Ghada Peters RN on 8/21/2019 at 3:46 PM

## 2019-09-09 DIAGNOSIS — F33.0 MAJOR DEPRESSIVE DISORDER, RECURRENT EPISODE, MILD (H): ICD-10-CM

## 2019-09-09 NOTE — LETTER
[address] [address]  Phone: [phone]  Fax: [phone]       September 11, 2019         Jaki Gómez  [address] [place], [place] [address]E [facility] [address]            Dear Jaki:    We are concerned about your health care.  We recently tried to reach you and was unable to via telephone. Many medications require routine follow-up with your doctor.    You will need to schedule an appointment for a medication follow-up to have your medication refilled. Please call to schedule soon so we can assure you have an appointment before your next refills are needed.    Thank you,      Narendra Peters MD

## 2019-09-11 NOTE — TELEPHONE ENCOUNTER
"Routing refill request to provider for review/approval because:  Unable to fill per RN protocol due to PHQ-9 >5.  Also overdue for depression recheck. Sending to scheduling for outreach.              sertraline  Last Written Prescription Date:  4/3/2019  Last Fill Quantity: 30,  # refills: 3   Last office visit: 1/31/2019 with prescribing provider:      Future Office Visit:      Requested Prescriptions   Pending Prescriptions Disp Refills     sertraline (ZOLOFT) 50 MG tablet [Pharmacy Med Name: SERTRALINE HCL 50MG TABS] 30 tablet 3     Sig: TAKE ONE TABLET BY MOUTH ONCE DAILY       SSRIs Protocol Failed - 9/9/2019  2:23 PM        Failed - PHQ-9 score less than 5 in past 6 months     Please review last PHQ-9 score.   PHQ-9 SCORE 4/19/2018 1/31/2019 6/17/2019   PHQ-9 Total Score - - -   PHQ-9 Total Score MyChart - 17 (Moderately severe depression) -   PHQ-9 Total Score 17 17 7             Failed - Recent (6 mo) or future (30 days) visit within the authorizing provider's specialty     Patient had office visit in the last 6 months or has a visit in the next 30 days with authorizing provider or within the authorizing provider's specialty.  See \"Patient Info\" tab in inbasket, or \"Choose Columns\" in Meds & Orders section of the refill encounter.            Passed - Medication is active on med list        Passed - Patient is age 18 or older        Passed - No active pregnancy on record        Passed - No positive pregnancy test in last 12 months        Ghada Peters RN on 9/11/2019 at 8:58 AM    "

## 2019-09-11 NOTE — TELEPHONE ENCOUNTER
Do you want to refill medication until patient possibly schedule an appointment? There phone number does not work.  Letter sent.     Nida Prado CMA

## 2019-09-24 DIAGNOSIS — M54.2 CERVICALGIA: ICD-10-CM

## 2019-09-24 NOTE — TELEPHONE ENCOUNTER
Tramadol 50 MG       Last Written Prescription Date:  8/21/19  Last Fill Quantity: 120,   # refills: 0  Last Office Visit: 1/31/19  Future Office visit:       Routing refill request to provider for review/approval because:  Drug not on the [facility], [facility] or [facility] refill protocol or controlled substance

## 2019-09-25 ENCOUNTER — THERAPY VISIT (OUTPATIENT)
Dept: CHIROPRACTIC MEDICINE | Facility: CLINIC | Age: 59
End: 2019-09-25
Payer: COMMERCIAL

## 2019-09-25 DIAGNOSIS — M54.12 BRACHIAL NEURITIS OR RADICULITIS: ICD-10-CM

## 2019-09-25 DIAGNOSIS — M99.02 SEGMENTAL DYSFUNCTION OF THORACIC REGION: ICD-10-CM

## 2019-09-25 DIAGNOSIS — M54.2 CERVICALGIA: ICD-10-CM

## 2019-09-25 DIAGNOSIS — M99.01 SEGMENTAL DYSFUNCTION OF CERVICAL REGION: Primary | ICD-10-CM

## 2019-09-25 DIAGNOSIS — M99.03 SEGMENTAL DYSFUNCTION OF LUMBAR REGION: ICD-10-CM

## 2019-09-25 PROCEDURE — 97035 APP MDLTY 1+ULTRASOUND EA 15: CPT | Performed by: CHIROPRACTOR

## 2019-09-25 PROCEDURE — 98941 CHIROPRACT MANJ 3-4 REGIONS: CPT | Mod: AT | Performed by: CHIROPRACTOR

## 2019-09-25 RX ORDER — TRAMADOL HYDROCHLORIDE 50 MG/1
50 TABLET ORAL EVERY 6 HOURS PRN
Qty: 120 TABLET | Refills: 0 | Status: SHIPPED | OUTPATIENT
Start: 2019-09-25 | End: 2019-10-21

## 2019-09-25 NOTE — PROGRESS NOTES
5/18/2018 Updated Lumbar MRI - IMPRESSION: I see no definite change since an MRI on 7/26/2016. There  are mild degenerative changes in the lower lumbar spine resulting in  mild central canal stenosis at L4-L5. No disc herniation or other site  of stenosis is seen.    Visit #:  3 of 12    Subjective:  Jaki Gómez is a 58 year old female who is seen in f/u up for:        Segmental dysfunction of cervical region  Segmental dysfunction of lumbar region  Brachial neuritis or radiculitis  Cervicalgia  Segmental dysfunction of thoracic region.       Since last visit on 8/12/2019,  Jaki Gómez reports: that she is having neck pain and her neck feels really tired. She has been having shocks in her right arm, and her pain is worse on the right. She rates her current pain 7/10.       ADL's : ADLs have been difficult with exacerbation of pain.          Objective:  The following was observed:      P: pain elicited on palpation, bilateral upper back   A: static palpation demonstrates intersegmental asymmetry, as noted  R: motion palpation notes restricted motion  T: hypertonicity at:  traps bilaterally      Segmental spinal dysfunction/restrictions found at:  C2 RR, LRR  T1 RR, LRR  T5 E, FR   T10 E, FR  Right SI posterior        Assessment:    Diagnoses:      1. Segmental dysfunction of cervical region    2. Segmental dysfunction of lumbar region    3. Brachial neuritis or radiculitis    4. Cervicalgia    5. Segmental dysfunction of thoracic region        Patient's condition:  Patient had restrictions pre-manipulation and Patient had decreased motion prior to manipulation    Treatment effectiveness:  Post manipulation there is better intersegmental movement and Patient claims to feel looser post manipulation      Procedures:  CMT:  03631 Chiropractic manipulative treatment 3-4 regions performed   Cervical: Deiversified, C2, Supine  Thoracic: Diversified, T1, T5, T10, Prone   Pelvis: Drop assist, Right SI,  Prone    Modalities:  MSTM to affected musculature  US to right upper traps, 1.0 guidry, continuous, 8 min      Therapeutic procedures:  Gave ice instructions post-adjustment, as needed.      Prognosis: Good    Progress towards Goals: Patient has been improving with care but had a recent flare-up of pain with falling.     Recommendations:    Instructions:ice 20 minutes every other hour as needed and stretch as instructed at visit     Follow-up:  Return to care PRN.

## 2019-10-21 DIAGNOSIS — F33.0 MAJOR DEPRESSIVE DISORDER, RECURRENT EPISODE, MILD (H): ICD-10-CM

## 2019-10-21 DIAGNOSIS — M54.2 CERVICALGIA: ICD-10-CM

## 2019-10-21 RX ORDER — TRAMADOL HYDROCHLORIDE 50 MG/1
50 TABLET ORAL EVERY 6 HOURS PRN
Qty: 120 TABLET | Refills: 0 | Status: SHIPPED | OUTPATIENT
Start: 2019-10-21 | End: 2019-11-18

## 2019-10-21 NOTE — TELEPHONE ENCOUNTER
Tramadol 50 MG       Last Written Prescription Date:  9-25-19  Last Fill Quantity: 120,   # refills: 0  Last Office Visit: 1/31/19  Future Office visit:       Routing refill request to provider for review/approval because:  Drug not on the [facility], [facility] or [facility] refill protocol or controlled substance

## 2019-10-22 NOTE — TELEPHONE ENCOUNTER
"Routing refill request to provider for review/approval because:  Labs out of range:  PHQ9  T'd up 1 month for provider review.    Requested Prescriptions   Pending Prescriptions Disp Refills     sertraline (ZOLOFT) 50 MG tablet [Pharmacy Med Name: SERTRALINE HCL 50MG TABS] 30 tablet 0     Sig: TAKE ONE TABLET BY MOUTH ONCE DAILY   Last Written Prescription Date:  9/11/2019  Last Fill Quantity: 30,  # refills: 0   Last office visit: 1/31/2019 with prescribing provider:     Future Office Visit:        SSRIs Protocol Failed - 10/21/2019 12:54 PM        Failed - PHQ-9 score less than 5 in past 6 months     Please review last PHQ-9 score.   PHQ-9 score:    PHQ-9 SCORE 6/17/2019   PHQ-9 Total Score -   PHQ-9 Total Score MyChart -   PHQ-9 Total Score 7           Failed - Recent (6 mo) or future (30 days) visit within the authorizing provider's specialty     Patient had office visit in the last 6 months or has a visit in the next 30 days with authorizing provider or within the authorizing provider's specialty.  See \"Patient Info\" tab in inbasket, or \"Choose Columns\" in Meds & Orders section of the refill encounter.            Passed - Medication is active on med list        Passed - Patient is age 18 or older        Passed - No active pregnancy on record        Passed - No positive pregnancy test in last 12 months      Raquel Ballesteros RN      "

## 2019-10-30 ENCOUNTER — THERAPY VISIT (OUTPATIENT)
Dept: CHIROPRACTIC MEDICINE | Facility: CLINIC | Age: 59
End: 2019-10-30
Payer: COMMERCIAL

## 2019-10-30 DIAGNOSIS — M99.03 SEGMENTAL DYSFUNCTION OF LUMBAR REGION: ICD-10-CM

## 2019-10-30 DIAGNOSIS — M99.01 SEGMENTAL DYSFUNCTION OF CERVICAL REGION: Primary | ICD-10-CM

## 2019-10-30 DIAGNOSIS — M54.12 BRACHIAL NEURITIS OR RADICULITIS: ICD-10-CM

## 2019-10-30 DIAGNOSIS — M54.2 CERVICALGIA: ICD-10-CM

## 2019-10-30 DIAGNOSIS — M99.02 SEGMENTAL DYSFUNCTION OF THORACIC REGION: ICD-10-CM

## 2019-10-30 PROCEDURE — 98941 CHIROPRACT MANJ 3-4 REGIONS: CPT | Mod: AT | Performed by: CHIROPRACTOR

## 2019-10-30 NOTE — PROGRESS NOTES
5/18/2018 Updated Lumbar MRI - IMPRESSION: I see no definite change since an MRI on 7/26/2016. There  are mild degenerative changes in the lower lumbar spine resulting in  mild central canal stenosis at L4-L5. No disc herniation or other site  of stenosis is seen.    Visit #:  4 of 12    Subjective:  Jaki Gómez is a 58 year old female who is seen in f/u up for:        Segmental dysfunction of cervical region  Segmental dysfunction of lumbar region  Brachial neuritis or radiculitis  Cervicalgia  Segmental dysfunction of thoracic region.       Since last visit on 9/25/2019,  Jaki Gómez reports: that she is sick today with a really bad head cold. She is having a lot of pain in her neck today on the right side. She rates her current pain 7/10. She is having a lot of headaches, too. She is not sleeping well.       ADL's : ADLs have been difficult with exacerbation of pain.       Objective:  The following was observed:      P: pain elicited on palpation, bilateral upper back   A: static palpation demonstrates intersegmental asymmetry, as noted  R: motion palpation notes restricted motion  T: hypertonicity at:  traps bilaterally      Segmental spinal dysfunction/restrictions found at:  C2 RR, LRR  T1 RR, LRR  T5 E, FR   T10 E, FR  Left SI posterior        Assessment:    Diagnoses:      1. Segmental dysfunction of cervical region    2. Segmental dysfunction of lumbar region    3. Brachial neuritis or radiculitis    4. Cervicalgia    5. Segmental dysfunction of thoracic region        Patient's condition:  Patient had restrictions pre-manipulation and Patient had decreased motion prior to manipulation    Treatment effectiveness:  Post manipulation there is better intersegmental movement and Patient claims to feel looser post manipulation      Procedures:  CMT:  29361 Chiropractic manipulative treatment 3-4 regions performed   Cervical: Deiversified, C2, Supine  Thoracic: Diversified, T1, T5, T10, Prone   Pelvis: Drop  assist, Left SI, Prone    Modalities:  MSTM to affected musculature HYPERVOLT      Therapeutic procedures:  Gave ice instructions post-adjustment, as needed.      Prognosis: Good    Progress towards Goals: Patient has been improving with care but had a recent flare-up of pain with falling.     Recommendations:    Instructions:ice 20 minutes every other hour as needed and stretch as instructed at visit     Follow-up:  Return to care PRN.

## 2019-11-18 DIAGNOSIS — M54.2 CERVICALGIA: ICD-10-CM

## 2019-11-18 NOTE — TELEPHONE ENCOUNTER
Tramadol 50 MG       Last Written Prescription Date:  10/21/19  Last Fill Quantity: 120,   # refills: 0  Last Office Visit: 1/31/19  Future Office visit:       Routing refill request to provider for review/approval because:  Drug not on the [facility], [facility] or [facility] refill protocol or controlled substance

## 2019-11-20 RX ORDER — TRAMADOL HYDROCHLORIDE 50 MG/1
TABLET ORAL
Qty: 120 TABLET | Refills: 0 | Status: SHIPPED | OUTPATIENT
Start: 2019-11-20 | End: 2019-12-15

## 2019-11-22 ENCOUNTER — THERAPY VISIT (OUTPATIENT)
Dept: CHIROPRACTIC MEDICINE | Facility: CLINIC | Age: 59
End: 2019-11-22
Payer: COMMERCIAL

## 2019-11-22 DIAGNOSIS — M99.02 SEGMENTAL DYSFUNCTION OF THORACIC REGION: ICD-10-CM

## 2019-11-22 DIAGNOSIS — M99.04 SEGMENTAL DYSFUNCTION OF SACRAL REGION: ICD-10-CM

## 2019-11-22 DIAGNOSIS — M54.2 CERVICALGIA: ICD-10-CM

## 2019-11-22 DIAGNOSIS — M99.01 SEGMENTAL DYSFUNCTION OF CERVICAL REGION: ICD-10-CM

## 2019-11-22 DIAGNOSIS — M99.03 SEGMENTAL DYSFUNCTION OF LUMBAR REGION: Primary | ICD-10-CM

## 2019-11-22 DIAGNOSIS — M54.12 BRACHIAL NEURITIS OR RADICULITIS: ICD-10-CM

## 2019-11-22 PROCEDURE — 98941 CHIROPRACT MANJ 3-4 REGIONS: CPT | Mod: AT | Performed by: CHIROPRACTOR

## 2019-11-22 NOTE — PROGRESS NOTES
"5/18/2018 Updated Lumbar MRI - IMPRESSION: I see no definite change since an MRI on 7/26/2016. There  are mild degenerative changes in the lower lumbar spine resulting in  mild central canal stenosis at L4-L5. No disc herniation or other site  of stenosis is seen.    Visit #:  5 of 12    Subjective:  Jaki Gómez is a 58 year old female who is seen in f/u up for:        Segmental dysfunction of cervical region  Segmental dysfunction of lumbar region  Brachial neuritis or radiculitis  Cervicalgia  Segmental dysfunction of thoracic region.       Since last visit on 10/30/2019,  Jaki Gómez reports: that she is in rough shape. She went shopping on Wednesday and \"went down hill from there.\" She has pain in her right lower back, and can \"hardly stand up.\" She has altered gait, is not sleeping well, and not moving well. Her pain is rated 9.9/10 today.       ADL's : ADLs have been difficult with exacerbation of pain.       Objective:  The following was observed:      P: pain elicited on palpation, Right lower back  A: static palpation demonstrates intersegmental asymmetry, as noted  R: motion palpation notes restricted motion  T: hypertonicity at: Lumbar paraspinals and QL on Right      Segmental spinal dysfunction/restrictions found at:  C5 RR, LRR  T1 RR, LRR  T5 E, FR   T10 E, FR  L2 RR, LRR  Right SI posterior        Assessment:    Diagnoses:      1. Segmental dysfunction of cervical region    2. Segmental dysfunction of lumbar region    3. Brachial neuritis or radiculitis    4. Cervicalgia    5. Segmental dysfunction of thoracic region        Patient's condition:  Patient had restrictions pre-manipulation and Patient had decreased motion prior to manipulation    Treatment effectiveness:  Post manipulation there is better intersegmental movement and Patient claims to feel looser post manipulation      Procedures:  CMT:  75656 Chiropractic manipulative treatment 3-4 regions performed   Cervical: Deiversified, C5, " Supine  Thoracic: Diversified, T1, T5, T10, Prone   Pelvis: Drop assist, L2, Right SI, Prone  Flexion Distraction to lumbar spine.     Modalities:  MSTM to affected musculature       Therapeutic procedures:  Gave ice instructions post-adjustment, as needed.      Prognosis: Good    Progress towards Goals: Patient has been improving with care but had a recent flare-up of pain with falling.     Recommendations:    Instructions:ice 20 minutes every other hour as needed and stretch as instructed at visit     Follow-up:  Return to care next week.

## 2019-11-25 ENCOUNTER — THERAPY VISIT (OUTPATIENT)
Dept: CHIROPRACTIC MEDICINE | Facility: CLINIC | Age: 59
End: 2019-11-25
Payer: COMMERCIAL

## 2019-11-25 DIAGNOSIS — M99.04 SEGMENTAL DYSFUNCTION OF SACRAL REGION: Primary | ICD-10-CM

## 2019-11-25 DIAGNOSIS — M99.02 SEGMENTAL DYSFUNCTION OF THORACIC REGION: ICD-10-CM

## 2019-11-25 DIAGNOSIS — M54.2 CERVICALGIA: ICD-10-CM

## 2019-11-25 DIAGNOSIS — M99.03 SEGMENTAL DYSFUNCTION OF LUMBAR REGION: ICD-10-CM

## 2019-11-25 DIAGNOSIS — M54.12 BRACHIAL NEURITIS OR RADICULITIS: ICD-10-CM

## 2019-11-25 DIAGNOSIS — M99.01 SEGMENTAL DYSFUNCTION OF CERVICAL REGION: ICD-10-CM

## 2019-11-25 PROCEDURE — 98941 CHIROPRACT MANJ 3-4 REGIONS: CPT | Mod: AT | Performed by: CHIROPRACTOR

## 2019-11-25 NOTE — PROGRESS NOTES
"5/18/2018 Updated Lumbar MRI - IMPRESSION: I see no definite change since an MRI on 7/26/2016. There  are mild degenerative changes in the lower lumbar spine resulting in  mild central canal stenosis at L4-L5. No disc herniation or other site  of stenosis is seen.    Visit #:  6 of 12    Subjective:  Jaki Gómez is a 58 year old female who is seen in f/u up for:        Segmental dysfunction of cervical region  Segmental dysfunction of lumbar region  Brachial neuritis or radiculitis  Cervicalgia  Segmental dysfunction of thoracic region.       Since last visit on 12/22/2019,  Jaki Gómez reports: that she is \"the same.\" When she rests, she is able to be up longer, but her pain is still significant. She has been using ice. Her pain is rated 8/10 today. Her neck is better.     ADL's : ADLs have been difficult with exacerbation of pain.       Objective:  The following was observed:      P: pain elicited on palpation, Right lower back  A: static palpation demonstrates intersegmental asymmetry, as noted  R: motion palpation notes restricted motion  T: hypertonicity at: Lumbar paraspinals and QL on Right      Segmental spinal dysfunction/restrictions found at:  T1 RR, LRR  T5 E, FR   T10 E, FR  L4 RR, LRR  Left SI posterior        Assessment:    Diagnoses:      1. Segmental dysfunction of cervical region    2. Segmental dysfunction of lumbar region    3. Brachial neuritis or radiculitis    4. Cervicalgia    5. Segmental dysfunction of thoracic region        Patient's condition:  Patient had restrictions pre-manipulation and Patient had decreased motion prior to manipulation    Treatment effectiveness:  Post manipulation there is better intersegmental movement and Patient claims to feel looser post manipulation      Procedures:  CMT:  92596 Chiropractic manipulative treatment 3-4 regions performed   Cervical: Deiversified, C5, Supine  Thoracic: Diversified, T1, T5, T10, Prone   Pelvis: Drop assist, L4, Left SI, " Prone  Flexion Distraction to lumbar spine.     Modalities:  MSTM to affected musculature   Flexion Distraction to lumbar spine.       Therapeutic procedures:  Gave ice instructions post-adjustment, as needed.      Prognosis: Good    Progress towards Goals: Patient has been improving with care but had a recent flare-up of pain with falling.     Recommendations:    Instructions:ice 20 minutes every other hour as needed and stretch as instructed at visit     Follow-up:  Return to care next week if symptoms persist.

## 2019-11-29 DIAGNOSIS — F33.0 MAJOR DEPRESSIVE DISORDER, RECURRENT EPISODE, MILD (H): ICD-10-CM

## 2019-11-29 NOTE — TELEPHONE ENCOUNTER
"Routing refill request to provider for review  Routing to scheduling for annual and depression appointment    Sertraline  Last Written Prescription Date:  10/23/2019  Last Fill Quantity: 30,  # refills: 0   Last office visit: 1/31/2019 with prescribing provider:     Future Office Visit:      Routing refill request to provider for review/approval because:  Ketty given x1 and patient did not follow up, please advise  Patient needs to be seen because:  6 month depression   PHQ out of range    PHQ-9 score:    PHQ-9 SCORE 6/17/2019   PHQ-9 Total Score -   PHQ-9 Total Score MyChart -   PHQ-9 Total Score 7     Requested Prescriptions   Pending Prescriptions Disp Refills     sertraline (ZOLOFT) 50 MG tablet [Pharmacy Med Name: SERTRALINE HCL 50MG TABS] 30 tablet 0     Sig: TAKE ONE TABLET BY MOUTH ONCE DAILY       SSRIs Protocol Failed - 11/29/2019  4:33 PM        Failed - PHQ-9 score less than 5 in past 6 months     Please review last PHQ-9 score.           Failed - Recent (6 mo) or future (30 days) visit within the authorizing provider's specialty     Patient had office visit in the last 6 months or has a visit in the next 30 days with authorizing provider or within the authorizing provider's specialty.  See \"Patient Info\" tab in inbasket, or \"Choose Columns\" in Meds & Orders section of the refill encounter.            Passed - Medication is active on med list        Passed - Patient is age 18 or older        Passed - No active pregnancy on record        Passed - No positive pregnancy test in last 12 months        Aleena Fernandez RN BSN    "

## 2019-11-29 NOTE — LETTER
[address] [address]  Phone: [phone]  Fax: [phone]        December 2, 2019      Jaki BEDOYA Rico                                                                                                                                [address] [facility] [address]            Dear Ms. Gómez,    We are concerned about your health care.  We recently provided you with a medication refill.  Many medications require routine follow-up with your Doctor.      At this time we ask that: You schedule a routine office visit with your physician to follow your Depression.     Your prescription: Has been refilled for 1 month so you may have time for the above noted follow-up.      Thank you,      Dr. Narendra Peters/ [place] Care Team

## 2019-12-15 DIAGNOSIS — M54.2 CERVICALGIA: ICD-10-CM

## 2019-12-16 RX ORDER — TRAMADOL HYDROCHLORIDE 50 MG/1
TABLET ORAL
Qty: 120 TABLET | Refills: 0 | Status: SHIPPED | OUTPATIENT
Start: 2019-12-16 | End: 2020-01-15

## 2019-12-16 NOTE — TELEPHONE ENCOUNTER
Tramadol 50 MG       Last Written Prescription Date:  11/20/19  Last Fill Quantity: 120,   # refills: 0  Last Office Visit: 1/31/19  Future Office visit:       Routing refill request to provider for review/approval because:  Drug not on the [facility], [facility] or [facility] refill protocol or controlled substance

## 2019-12-18 DIAGNOSIS — E03.9 HYPOTHYROIDISM, UNSPECIFIED TYPE: ICD-10-CM

## 2019-12-18 RX ORDER — LEVOTHYROXINE SODIUM 100 UG/1
TABLET ORAL
Qty: 90 TABLET | Refills: 0 | Status: SHIPPED | OUTPATIENT
Start: 2019-12-18 | End: 2020-03-30

## 2019-12-18 NOTE — TELEPHONE ENCOUNTER
"Levothyroxine  Last Written Prescription Date:  04/22/2019  Last Fill Quantity: 90 ,  # refills: 01   Last office visit: 1/31/2019 with prescribing provider:     Future Office Visit:      Requested Prescriptions   Pending Prescriptions Disp Refills     levothyroxine (SYNTHROID/LEVOTHROID) 100 MCG tablet [Pharmacy Med Name: LEVOTHYROXINE 100MCG TAB] 90 tablet 1     Sig: TAKE ONE TABLET BY MOUTH EVERY DAY       Thyroid Protocol Failed - 12/18/2019  3:41 PM        Failed - Normal TSH on file in past 12 months     Recent Labs   Lab Test 12/10/18  1412   TSH 1.15              Passed - Patient is 12 years or older        Passed - Recent (12 mo) or future (30 days) visit within the authorizing provider's specialty     Patient has had an office visit with the authorizing provider or a provider within the authorizing providers department within the previous 12 mos or has a future within next 30 days. See \"Patient Info\" tab in inbasket, or \"Choose Columns\" in Meds & Orders section of the refill encounter.              Passed - Medication is active on med list        Passed - No active pregnancy on record     If patient is pregnant or has had a positive pregnancy test, please check TSH.          Passed - No positive pregnancy test in past 12 months     If patient is pregnant or has had a positive pregnancy test, please check TSH.          Medication is being filled for 1 time refill only due to:  Patient needs to be seen because it has been more than one year since last visit.   Aleena Fernandez RN BSN    "

## 2019-12-18 NOTE — TELEPHONE ENCOUNTER
Routing to scheduling.   Patient needs to be seen because it has been more than one year since last visit.  Aleena Fernandez RN BSN

## 2019-12-18 NOTE — LETTER
[address] [address]  Phone: [phone]  Fax: [phone]        December 19, 2019      Jaki AURELIANO Gómez                                                                                                                                [address] [place] [address] [address] [facility] [address]            Dear Ms. Gómez,    We are concerned about your health care.  We recently provided you with a medication refill.  Many medications require routine follow-up with your Doctor.      At this time we ask that: You schedule an appointment for your annual physical.    Your prescription: Has been refilled for 1 month so you may have time for the above noted follow-up.      Thank you,      Dr Narendra Peters/ [place] [facility] Team

## 2019-12-19 NOTE — TELEPHONE ENCOUNTER
Phone # on file is disconnected. Routing back to team to send letter.  Thank you,  Pilar Ngo- Patient Representative

## 2019-12-24 NOTE — TELEPHONE ENCOUNTER
"  [facility]  Adult Nutrition  Consult Note    SUMMARY     Recommendations  1. If able to advance diet, recommend Regular with texture per SLP recommendations.     2. If unable to advance diet and enteral access gained, recommend starting TF.   Isosouce 1.5 @ goal rate 50mL/hr.   - Initiate @ 10mL/hr and increase by 10mL q4hrs, or as tolerated, until goal rate is reached.   - Provides 1800kcals, 82g protein and 917mL free water.     RD to monitor.    Goals: Pt to receive nutrition by RD follow up  Nutrition Goal Status: new  Communication of RD Recs: reviewed with RN    Reason for Assessment    Reason For Assessment: consult  Diagnosis: other (see comments)(SDH)  Relevant Medical History: CHF, parkinsons  Interdisciplinary Rounds: attended  General Information Comments: Pt remains NPO, pending SLP eval. Pt receives mechanical soft diet at NH. Pt reports eating adequate po at NH, weight stable > 1 year approx 170lb. Noted possibly weight loss at admission to NH 1 year ago however pt does not elaborate on this. NFPE completed- pt with age related sarcopenia and does not meet criteria for malnutrition at this time.  Nutrition Discharge Planning: unable to determine at this time    Nutrition Risk Screen         Nutrition/Diet History    Factors Affecting Nutritional Intake: NPO    Anthropometrics    Temp: 98 °F (36.7 °C)  Height: 5' 10" (177.8 cm)  Height (inches): 70 in  Weight: 77.1 kg (170 lb)  Weight (lb): 170 lb  Ideal Body Weight (IBW), Male: 166 lb  % Ideal Body Weight, Male (lb): 102.41 %  BMI (Calculated): 24.4  BMI Grade: 30 - 34.9- obesity - grade I       Lab/Procedures/Meds    Pertinent Labs Reviewed: reviewed  Pertinent Medications Reviewed: reviewed  Pertinent Medications Comments: IVF, cardene    Estimated/Assessed Needs    Weight Used For Calorie Calculations: 77.1 kg (169 lb 15.6 oz)  Energy Calorie Requirements (kcal): 1821  Energy Need Method: Camp-St Jeor(PAL 1.25)  Protein " Tramadol   50 MG     Last Written Prescription Date:  10/15/18  Last Fill Quantity: 120,   # refills: 0  Last Office Visit: 5/9/18  Future Office visit:       Routing refill request to provider for review/approval because:  Drug not on the [facility], [facility] or [facility] refill protocol or controlled substance     Requirements: 77-93g(1.0-1.2g/kg)  Weight Used For Protein Calculations: 77.1 kg (169 lb 15.6 oz)  Fluid Requirements (mL): 1mL/kcal or per MD     RDA Method (mL): 1821         Nutrition Prescription Ordered    Current Diet Order: NPO    Evaluation of Received Nutrient/Fluid Intake    IV Fluid (mL): 1200  % Intake of Estimated Energy Needs: 0 - 25 %  % Meal Intake: NPO    Nutrition Risk    Level of Risk/Frequency of Follow-up: high(f/u 2x/week)     Assessment and Plan    Nutrition Problem  Inadequate energy intake    Related to (etiology):   NPO    Signs and Symptoms (as evidenced by):   Pt receiving <85% EEN and EPN.     Interventions(treatment strategy):  Collaboration of care with providers    Nutrition Diagnosis Status:   New         Monitor and Evaluation    Food and Nutrient Intake: energy intake, food and beverage intake, enteral nutrition intake  Food and Nutrient Adminstration: diet order, enteral and parenteral nutrition administration  Anthropometric Measurements: weight, weight change, body mass index  Biochemical Data, Medical Tests and Procedures: electrolyte and renal panel, gastrointestinal profile, glucose/endocrine profile, inflammatory profile, lipid profile  Nutrition-Focused Physical Findings: overall appearance     Malnutrition Assessment             Weight Loss (Malnutrition): (stable 170lb per pt)   Orbital Region (Subcutaneous Fat Loss): moderate depletion  Upper Arm Region (Subcutaneous Fat Loss): moderate depletion  Thoracic and Lumbar Region: mild depletion   Religion Region (Muscle Loss): moderate depletion  Clavicle Bone Region (Muscle Loss): moderate depletion  Clavicle and Acromion Bone Region (Muscle Loss): moderate depletion  Scapular Bone Region (Muscle Loss): moderate depletion  Dorsal Hand (Muscle Loss): moderate depletion  Patellar Region (Muscle Loss): moderate depletion  Anterior Thigh Region (Muscle Loss): moderate depletion  Posterior Calf Region (Muscle Loss): moderate  depletion                 Nutrition Follow-Up    RD Follow-up?: Yes

## 2020-01-13 DIAGNOSIS — M54.2 CERVICALGIA: ICD-10-CM

## 2020-01-13 DIAGNOSIS — F33.0 MAJOR DEPRESSIVE DISORDER, RECURRENT EPISODE, MILD (H): ICD-10-CM

## 2020-01-15 RX ORDER — TRAMADOL HYDROCHLORIDE 50 MG/1
TABLET ORAL
Qty: 120 TABLET | Refills: 0 | Status: SHIPPED | OUTPATIENT
Start: 2020-01-15 | End: 2020-02-11

## 2020-01-15 NOTE — TELEPHONE ENCOUNTER
"ULTRAM 50 mg  Last Written Prescription Date:  12/16/19  Last Fill Quantity: 120 ( one tablet every 6 hours prn) ,  # refills: 0   Last office visit: 1/31/2019 with prescribing provider:     Future Office Visit:  NONE    Zoloft 50 mg  Last Written Prescription Date:  12/2/19  Last Fill Quantity: 30,  # refills: 0   Last office visit: 1/31/2019 with prescribing provider:     Future Office Visit:  NONE  PHQ-9 SCORE 4/19/2018 1/31/2019 6/17/2019   PHQ-9 Total Score - - -   PHQ-9 Total Score MyChart - 17 (Moderately severe depression) -   PHQ-9 Total Score 17 17 7       Requested Prescriptions   Pending Prescriptions Disp Refills     traMADol (ULTRAM) 50 MG tablet [Pharmacy Med Name: TRAMADOL HCL 50MG TABS] 120 tablet 0     Sig: TAKE ONE TABLET BY MOUTH EVERY 6 HOURS AS NEEDED       There is no refill protocol information for this order        sertraline (ZOLOFT) 50 MG tablet [Pharmacy Med Name: SERTRALINE HCL 50MG TABS] 30 tablet 0     Sig: TAKE ONE TABLET BY MOUTH ONCE DAILY       SSRIs Protocol Failed - 1/13/2020 11:49 AM        Failed - PHQ-9 score less than 5 in past 6 months     Please review last PHQ-9 score.         Failed - Recent (6 mo) or future (30 days) visit within the authorizing provider's specialty     Patient had office visit in the last 6 months or has a visit in the next 30 days with authorizing provider or within the authorizing provider's specialty.  See \"Patient Info\" tab in inbasket, or \"Choose Columns\" in Meds & Orders section of the refill encounter.            Passed - Medication is active on med list        Passed - Patient is age 18 or older        Passed - No active pregnancy on record        Passed - No positive pregnancy test in last 12 months      Routing refill request to provider for review/approval because:   ULTRAM Drug not on the [facility] refill protocol  Forward Zoloft as PHQ9 score > 5 .....MIMA Molina              "

## 2020-01-24 ENCOUNTER — THERAPY VISIT (OUTPATIENT)
Dept: CHIROPRACTIC MEDICINE | Facility: CLINIC | Age: 60
End: 2020-01-24
Payer: COMMERCIAL

## 2020-01-24 DIAGNOSIS — M99.01 SEGMENTAL DYSFUNCTION OF CERVICAL REGION: Primary | ICD-10-CM

## 2020-01-24 DIAGNOSIS — M54.2 CERVICALGIA: ICD-10-CM

## 2020-01-24 DIAGNOSIS — M99.03 SEGMENTAL DYSFUNCTION OF LUMBAR REGION: ICD-10-CM

## 2020-01-24 DIAGNOSIS — M54.12 BRACHIAL NEURITIS OR RADICULITIS: ICD-10-CM

## 2020-01-24 DIAGNOSIS — M99.02 SEGMENTAL DYSFUNCTION OF THORACIC REGION: ICD-10-CM

## 2020-01-24 PROCEDURE — 98941 CHIROPRACT MANJ 3-4 REGIONS: CPT | Mod: AT | Performed by: CHIROPRACTOR

## 2020-01-24 NOTE — PROGRESS NOTES
5/18/2018 Updated Lumbar MRI - IMPRESSION: I see no definite change since an MRI on 7/26/2016. There  are mild degenerative changes in the lower lumbar spine resulting in  mild central canal stenosis at L4-L5. No disc herniation or other site  of stenosis is seen.    Visit #:  7 of 12    Subjective:  Jaki Gómez is a 58 year old female who is seen in f/u up for:        Segmental dysfunction of cervical region  Segmental dysfunction of lumbar region  Brachial neuritis or radiculitis  Cervicalgia  Segmental dysfunction of thoracic region.       Since last visit on 11/25/2019,  Jaki Gómez reports: that she is glad to be here. She has been really stiff in her whole spine, especially her neck. She rates her symptoms 8/10.       ADL's : ADLs have been difficult with exacerbation of pain.       Objective:  The following was observed:      P: pain elicited on palpation, Right lower back  A: static palpation demonstrates intersegmental asymmetry, as noted  R: motion palpation notes restricted motion  T: hypertonicity at: Lumbar paraspinals and QL on Right      Segmental spinal dysfunction/restrictions found at:  C2 LR, RRR  C5 RR, LRR  T1 RR, LRR  T5 E, FR   T10 E, FR  L4 LR, RRR  Right SI posterior        Assessment:    Diagnoses:      1. Segmental dysfunction of cervical region    2. Segmental dysfunction of lumbar region    3. Brachial neuritis or radiculitis    4. Cervicalgia    5. Segmental dysfunction of thoracic region        Patient's condition:  Patient had restrictions pre-manipulation and Patient had decreased motion prior to manipulation    Treatment effectiveness:  Post manipulation there is better intersegmental movement and Patient claims to feel looser post manipulation      Procedures:  CMT:  20846 Chiropractic manipulative treatment 3-4 regions performed   Cervical: Deiversified, C2, C5, Supine  Thoracic: Diversified, T1, T5, T10, Prone   Pelvis: Drop assist, L4, Right SI, Prone  Flexion Distraction  to lumbar spine.     Modalities:  MSTM to affected musculature   Flexion Distraction to lumbar spine.       Therapeutic procedures:  Gave ice instructions post-adjustment, as needed.      Prognosis: Good    Progress towards Goals: Patient has been improving with care but had a recent flare-up of pain with falling.     Recommendations:    Instructions:ice 20 minutes every other hour as needed and stretch as instructed at visit     Follow-up:  Return to care next week if symptoms persist.

## 2020-02-26 ENCOUNTER — THERAPY VISIT (OUTPATIENT)
Dept: CHIROPRACTIC MEDICINE | Facility: CLINIC | Age: 60
End: 2020-02-26
Payer: COMMERCIAL

## 2020-02-26 DIAGNOSIS — M99.01 SEGMENTAL DYSFUNCTION OF CERVICAL REGION: Primary | ICD-10-CM

## 2020-02-26 DIAGNOSIS — M54.12 BRACHIAL NEURITIS OR RADICULITIS: ICD-10-CM

## 2020-02-26 DIAGNOSIS — M54.2 CERVICALGIA: ICD-10-CM

## 2020-02-26 DIAGNOSIS — M99.03 SEGMENTAL DYSFUNCTION OF LUMBAR REGION: ICD-10-CM

## 2020-02-26 DIAGNOSIS — M99.02 SEGMENTAL DYSFUNCTION OF THORACIC REGION: ICD-10-CM

## 2020-02-26 PROCEDURE — 98941 CHIROPRACT MANJ 3-4 REGIONS: CPT | Mod: AT | Performed by: CHIROPRACTOR

## 2020-02-26 NOTE — PROGRESS NOTES
5/18/2018 Updated Lumbar MRI - IMPRESSION: I see no definite change since an MRI on 7/26/2016. There  are mild degenerative changes in the lower lumbar spine resulting in  mild central canal stenosis at L4-L5. No disc herniation or other site  of stenosis is seen.    Visit #:  7 of 12    Subjective:  Jaki Gómez is a 58 year old female who is seen in f/u up for:        Segmental dysfunction of cervical region  Segmental dysfunction of lumbar region  Brachial neuritis or radiculitis  Cervicalgia  Segmental dysfunction of thoracic region.       Since last visit on 1/24/2020,  Jaki Gómez reports: that her neck is really bad today. She feels good for about 2 weeks and then she slowly gets stiffer. She is asking about more regular care because she feels like she exacerbated easily. She rates her current pain 7/10.       ADL's : ADLs have been difficult with exacerbation of pain.       Objective:  The following was observed:      P: pain elicited on palpation, Right upper back and neck  A: static palpation demonstrates intersegmental asymmetry, as noted  R: motion palpation notes restricted motion  T: hypertonicity at: Traps on Right      Segmental spinal dysfunction/restrictions found at:  C1 RR, LRR  C2 LR, RRR  T1 RR, LRR  T5 E, FR   T10 E, FR  Right SI posterior        Assessment:    Diagnoses:      1. Segmental dysfunction of cervical region    2. Segmental dysfunction of lumbar region    3. Brachial neuritis or radiculitis    4. Cervicalgia    5. Segmental dysfunction of thoracic region        Patient's condition:  Patient had restrictions pre-manipulation and Patient had decreased motion prior to manipulation    Treatment effectiveness:  Post manipulation there is better intersegmental movement and Patient claims to feel looser post manipulation      Procedures:  CMT:  08568 Chiropractic manipulative treatment 3-4 regions performed   Cervical: Deiversified, C1, C2, Supine  Thoracic: Diversified, T1, T5, T10,  Prone   Pelvis: Drop assist, Right SI, Prone    Modalities:  MSTM to affected musculature         Therapeutic procedures:  Gave ice instructions post-adjustment, as needed.      Prognosis: Good    Progress towards Goals: Patient has been improving with care but had a recent flare-up of pain with falling.     Recommendations:    Instructions:ice 20 minutes every other hour as needed and stretch as instructed at visit     Follow-up:  Return to care PRN.

## 2020-03-09 DIAGNOSIS — M54.2 CERVICALGIA: ICD-10-CM

## 2020-03-09 NOTE — TELEPHONE ENCOUNTER
Tramadol 50 MG       Last Written Prescription Date:  2/12/2020  Last Fill Quantity: 120,   # refills: 0  Last Office Visit: 1/31/19  Future Office visit:       Routing refill request to provider for review/approval because:  Drug not on the [facility], P or [facility] refill protocol or controlled substance

## 2020-03-11 RX ORDER — TRAMADOL HYDROCHLORIDE 50 MG/1
TABLET ORAL
Qty: 120 TABLET | Refills: 0 | Status: SHIPPED | OUTPATIENT
Start: 2020-03-11 | End: 2020-04-06

## 2020-03-30 DIAGNOSIS — E03.9 HYPOTHYROIDISM, UNSPECIFIED TYPE: ICD-10-CM

## 2020-03-30 RX ORDER — LEVOTHYROXINE SODIUM 100 UG/1
TABLET ORAL
Qty: 90 TABLET | Refills: 0 | Status: SHIPPED | OUTPATIENT
Start: 2020-03-30 | End: 2020-07-29

## 2020-03-30 NOTE — TELEPHONE ENCOUNTER
Routing refill request to provider for review/approval because:  Ketty given x1 and patient did not follow up, please advise    Anabel Wells RN on 3/30/2020 at 2:49 PM

## 2020-03-30 NOTE — TELEPHONE ENCOUNTER
"Requested Prescriptions   Pending Prescriptions Disp Refills     levothyroxine (SYNTHROID/LEVOTHROID) 100 MCG tablet [Pharmacy Med Name: LEVOTHYROXINE 100MCG TAB] 90 tablet 0     Sig: TAKE ONE TABLET BY MOUTH ONCE DAILY   Last Written Prescription Date:  12/18/19  Last Fill Quantity: 90,  # refills: 0   Last office visit: 1/31/2019 with prescribing provider:  Fran   Future Office Visit:        Thyroid Protocol Failed - 3/30/2020  1:35 PM        Failed - Recent (12 mo) or future (30 days) visit within the authorizing provider's specialty     Patient has had an office visit with the authorizing provider or a provider within the authorizing providers department within the previous 12 mos or has a future within next 30 days. See \"Patient Info\" tab in inbasket, or \"Choose Columns\" in Meds & Orders section of the refill encounter.              Failed - Normal TSH on file in past 12 months     Recent Labs   Lab Test 12/10/18  1412   TSH 1.15            Passed - Patient is 12 years or older        Passed - Medication is active on med list        Passed - No active pregnancy on record     If patient is pregnant or has had a positive pregnancy test, please check TSH.          Passed - No positive pregnancy test in past 12 months     If patient is pregnant or has had a positive pregnancy test, please check TSH.               "

## 2020-04-06 DIAGNOSIS — M54.2 CERVICALGIA: ICD-10-CM

## 2020-04-06 RX ORDER — TRAMADOL HYDROCHLORIDE 50 MG/1
TABLET ORAL
Qty: 120 TABLET | Refills: 0 | Status: SHIPPED | OUTPATIENT
Start: 2020-04-06 | End: 2020-05-06

## 2020-04-06 NOTE — TELEPHONE ENCOUNTER
Tramadol 50 MG       Last Written Prescription Date:  3/11/2020  Last Fill Quantity: 120,   # refills: 0  Last Office Visit: 1/31/19  Future Office visit:       Routing refill request to provider for review/approval because:  Drug not on the [facility], P or [facility] refill protocol or controlled substance

## 2020-05-04 DIAGNOSIS — M54.2 CERVICALGIA: ICD-10-CM

## 2020-05-04 DIAGNOSIS — F33.0 MAJOR DEPRESSIVE DISORDER, RECURRENT EPISODE, MILD (H): ICD-10-CM

## 2020-05-05 NOTE — TELEPHONE ENCOUNTER
Pending Prescriptions:                       Disp   Refills    sertraline (ZOLOFT) 50 MG tablet [Pharmacy*30 tab*1        Sig: TAKE ONE TABLET BY MOUTH ONCE DAILY    traMADol (ULTRAM) 50 MG tablet [Pharmacy M*120 ta*0        Sig: TAKE ONE TABLET BY MOUTH EVERY 6 HOURS AS NEEDED    Routing refill request to provider for review/approval because:  Drug not on the [facility] refill protocol   PHQ-9 score:    PHQ 6/17/2019   PHQ-9 Total Score 7   Q9: Thoughts of better off dead/self-harm past 2 weeks Not at all           Jese Kendall, RN, BSN      (Covering RN, please route response(s) to your care team for any follow up that is needed. Thank you!)

## 2020-05-06 RX ORDER — TRAMADOL HYDROCHLORIDE 50 MG/1
TABLET ORAL
Qty: 120 TABLET | Refills: 0 | Status: SHIPPED | OUTPATIENT
Start: 2020-05-06 | End: 2020-06-01

## 2020-06-01 DIAGNOSIS — M54.2 CERVICALGIA: ICD-10-CM

## 2020-06-01 RX ORDER — TRAMADOL HYDROCHLORIDE 50 MG/1
TABLET ORAL
Qty: 120 TABLET | Refills: 0 | Status: SHIPPED | OUTPATIENT
Start: 2020-06-01 | End: 2020-06-29

## 2020-06-01 NOTE — TELEPHONE ENCOUNTER
Requested Prescriptions   Pending Prescriptions Disp Refills     traMADol (ULTRAM) 50 MG tablet [Pharmacy Med Name: TRAMADOL HCL 50MG TABS] 120 tablet 0     Sig: TAKE ONE TABLET BY MOUTH EVERY 6 HOURS AS NEEDED     Last Written Prescription Date:  05/06/2020  Last Fill Quantity: 120,   # refills: 0  Last Office Visit: 01/31/2019  Future Office visit:       Routing refill request to provider for review/approval because:  Drug not on the [facility], [facility] or  Health refill protocol or controlled substance    Mila Gaytan MA

## 2020-06-26 DIAGNOSIS — M54.2 CERVICALGIA: ICD-10-CM

## 2020-06-26 NOTE — LETTER
[address] [address]  Phone: [phone]  Fax: [phone]        June 29, 2020      Jaki Gómez                                                                                                                                [address] [place] [id number]            Dear Ms. Gómez,    We are concerned about your health care.  We recently provided you with a medication refill.  Many medications require routine follow-up with your Doctor.      At this time we ask that: You schedule a routine office visit with your physician to follow your care.     Your prescription: No further refills will be given until your follow up care is completed.      Thank you,      [facility]

## 2020-06-26 NOTE — TELEPHONE ENCOUNTER
Routing to schedulers to set up virtual appointment for patient for med check.  Please also ask patient how much medication she has left and schedule appropriately.   If patient needs a refill before their appointment, please route to PROVIDER for completion of refill.  Upon routing message, write WHEN appointment is scheduled and if they have enough medication to last to appointment.  Thank you!    Routing refill request to provider for review/approval because:  Drug not on the G refill protocol   Patient needs to be seen because it has been more than 1 year since last office visit.  Last Written Prescription Date:  6/1/2020  Last Fill Quantity: 120,  # refills: 0   Last office visit: 1/31/2019 with prescribing provider:     Future Office Visit:      Pina De Leon, SARWATN, RN

## 2020-06-29 RX ORDER — TRAMADOL HYDROCHLORIDE 50 MG/1
TABLET ORAL
Qty: 120 TABLET | Refills: 0 | Status: SHIPPED | OUTPATIENT
Start: 2020-06-29 | End: 2020-07-29

## 2020-06-29 NOTE — TELEPHONE ENCOUNTER
Unable to close encounter. Will reroute to team.  Thank you,  Vicki Rivas   for [facility]

## 2020-06-29 NOTE — TELEPHONE ENCOUNTER
Unable to reach patient, letter sent. Routing to pcp to sign or deny medication.     Mila Gaytan MA

## 2020-07-28 DIAGNOSIS — M54.2 CERVICALGIA: ICD-10-CM

## 2020-07-28 DIAGNOSIS — F33.0 MAJOR DEPRESSIVE DISORDER, RECURRENT EPISODE, MILD (H): ICD-10-CM

## 2020-07-28 DIAGNOSIS — E03.9 HYPOTHYROIDISM, UNSPECIFIED TYPE: ICD-10-CM

## 2020-07-29 RX ORDER — TRAMADOL HYDROCHLORIDE 50 MG/1
TABLET ORAL
Qty: 120 TABLET | Refills: 0 | Status: SHIPPED | OUTPATIENT
Start: 2020-07-29 | End: 2020-08-26

## 2020-07-29 RX ORDER — LEVOTHYROXINE SODIUM 100 UG/1
TABLET ORAL
Qty: 90 TABLET | Refills: 0 | Status: SHIPPED | OUTPATIENT
Start: 2020-07-29 | End: 2020-11-25

## 2020-07-29 NOTE — TELEPHONE ENCOUNTER
ULTRAM  Last Written Prescription Date:  6/29/20  Last Fill Quantity: 120,  # refills: 0   Last office visit: 1/31/2019 with prescribing provider:  Dr. Fran López Office Visit:   Next 5 appointments (look out 90 days)    Aug 12, 2020 11:20 AM CDT  PHYSICAL with Narendra Peters MD  [address] [address]  [phone]         Levothyroxine   Last Written Prescription Date:  3/30/20  Last Fill Quantity: 90,  # refills: 0   Last office visit: 1/31/2019 with prescribing provider:   Dr. Fran López Office Visit:   Next 5 appointments (look out 90 days)    Aug 12, 2020 11:20 AM CDT  PHYSICAL with Narendra Peters MD  [address] [address]  [phone]         ZOLOFT  Last Written Prescription Date:  5/6/20  Last Fill Quantity: 30,  # refills: 1   Last office visit: 1/31/2019 with prescribing provider:  Dr. Peters   Future Office Visit:   Next 5 appointments (look out 90 days)    Aug 12, 2020 11:20 AM CDT  PHYSICAL with Narendra Peters MD  [address] [address]  [phone]         Requested Prescriptions   Pending Prescriptions Disp Refills     traMADol (ULTRAM) 50 MG tablet [Pharmacy Med Name: TRAMADOL HCL 50MG TABS] 120 tablet 0     Sig: TAKE ONE TABLET BY MOUTH EVERY 6 HOURS AS NEEDED       There is no refill protocol information for this order        levothyroxine (SYNTHROID/LEVOTHROID) 100 MCG tablet [Pharmacy Med Name: LEVOTHYROXINE 100MCG TAB] 90 tablet 0     Sig: TAKE ONE TABLET BY MOUTH ONCE DAILY       Thyroid Protocol Failed - 7/28/2020 11:17 AM        Failed - Recent (12 mo) or future (30 days) visit within the authorizing provider's specialty     Patient has had an office visit with the authorizing provider or a provider within the authorizing providers  "department within the previous 12 mos or has a future within next 30 days. See \"Patient Info\" tab in inbasket, or \"Choose Columns\" in Meds & Orders section of the refill encounter.            Failed - Normal TSH on file in past 12 months     Recent Labs   Lab Test 12/10/18  1412   TSH 1.15            Passed - Patient is 12 years or older        Passed - Medication is active on med list        Passed - No active pregnancy on record     If patient is pregnant or has had a positive pregnancy test, please check TSH.        Passed - No positive pregnancy test in past 12 months     If patient is pregnant or has had a positive pregnancy test, please check TSH.           sertraline (ZOLOFT) 50 MG tablet [Pharmacy Med Name: SERTRALINE HCL 50MG TABS] 30 tablet 1     Sig: TAKE ONE TABLET BY MOUTH ONCE DAILY       SSRIs Protocol Failed - 7/28/2020 11:17 AM        Failed - PHQ-9 score less than 5 in past 6 months     Please review last PHQ-9 score.           Failed - Recent (6 mo) or future (30 days) visit within the authorizing provider's specialty     Patient had office visit in the last 6 months or has a visit in the next 30 days with authorizing provider or within the authorizing provider's specialty.  See \"Patient Info\" tab in inbasket, or \"Choose Columns\" in Meds & Orders section of the refill encounter.            Passed - Medication is active on med list        Passed - Patient is age 18 or older        Passed - No active pregnancy on record        Passed - No positive pregnancy test in last 12 months         Routing refill request to provider for review/approval because:  Labs not current:  See above.   And PHQ9 score = 7 on 6/17/19  Needs to be seen . Has appt made for 8/12/20 Dr. Fran Molina,RN                                "

## 2020-08-24 DIAGNOSIS — M54.2 CERVICALGIA: ICD-10-CM

## 2020-08-25 NOTE — TELEPHONE ENCOUNTER
Requested Prescriptions   Pending Prescriptions Disp Refills     traMADol (ULTRAM) 50 MG tablet [Pharmacy Med Name: TRAMADOL HCL 50MG TABS] 120 tablet 0     Sig: TAKE ONE TABLET BY MOUTH EVERY 6 HOURS AS NEEDED     Last Written Prescription Date:  07/29/2020  Last Fill Quantity: 120,   # refills: 0  Last Office Visit: 01/31/2019  Future Office visit:    Next 5 appointments (look out 90 days)    Sep 28, 2020 11:20 AM CDT  Office Visit with Narendra Peters MD  [facility] ([address] [id number] [phone]           Routing refill request to provider for review/approval because:  Drug not on the [facility], [facility] or  Health refill protocol or controlled substance    Mila Gaytan MA

## 2020-08-26 RX ORDER — TRAMADOL HYDROCHLORIDE 50 MG/1
TABLET ORAL
Qty: 120 TABLET | Refills: 0 | Status: SHIPPED | OUTPATIENT
Start: 2020-08-26 | End: 2020-09-23

## 2020-09-21 ENCOUNTER — VIRTUAL VISIT (OUTPATIENT)
Dept: FAMILY MEDICINE | Facility: OTHER | Age: 60
End: 2020-09-21
Payer: COMMERCIAL

## 2020-09-21 PROCEDURE — 99421 OL DIG E/M SVC 5-10 MIN: CPT | Performed by: PHYSICIAN ASSISTANT

## 2020-09-21 NOTE — PROGRESS NOTES
"Date: 2020 16:23:35  Clinician: Duane Velasco  Clinician NPI: [id number]  Patient: casandra regan  Patient : 1960  Patient Address: [address] [place] [address]  Patient Phone: [phone]  Visit Protocol: URI  Patient Summary:  casandra is a 60 year old ( : 1960 ) female who initiated a [facility] Visit for COVID-19 (Coronavirus) evaluation and screening. When asked the question \"Please sign me up to receive news, health information and promotions from [facility].\", casandra responded \"No\".    casandra states her symptoms started 1-2 days ago.   Her symptoms consist of chills, malaise, ageusia, diarrhea, a cough, nasal congestion, a headache, ear pain, anosmia, rhinitis, nausea, enlarged lymph nodes, and myalgia. She is experiencing mild difficulty breathing with activities but can speak normally in full sentences. casandra also feels feverish.   Symptom details     Nasal secretions: The color of her mucus is white.    Cough: casandra coughs a few times an hour and her cough is not more bothersome at night. Phlegm comes into her throat when she coughs. She believes her cough is caused by post-nasal drip. The color of the phlegm is white and clear.     Temperature: Her current temperature is 99.3 degrees Fahrenheit.     Headache: She states the headache is moderate (4-6 on a 10 point pain scale).      casandra denies having facial pain or pressure, sore throat, teeth pain, vomiting, and wheezing. She also denies having a sinus infection within the past year, taking antibiotic medication in the past month, and having recent facial or sinus surgery in the past 60 days.   Precipitating events  She has not recently been exposed to someone with influenza. casandra has not been in close contact with any high risk individuals.   Pertinent COVID-19 (Coronavirus) information  In the past 14 days, casandra has not worked in a congregate living setting.   She does not work or volunteer as healthcare worker or a "  and does not work or volunteer in a healthcare facility.   casandra also has not lived in a congregate living setting in the past 14 days. She does not live with a healthcare worker.   casandra has not had a close contact with a laboratory-confirmed COVID-19 patient within 14 days of symptom onset.   Since December 2019, casandra and has not had upper respiratory infection or influenza-like illness. Has not been diagnosed with lab-confirmed COVID-19 test   Pertinent medical history  casandra does not get yeast infections when she takes antibiotics.   casandra does not need a return to work/school note.   Weight: 110 lbs   casandra smokes or uses smokeless tobacco.   Weight: 110 lbs    MEDICATIONS: Synthroid oral, tramadol oral, Zoloft oral, ALLERGIES: Sulfa (Sulfonamide Antibiotics)  Clinician Response:  Dear casandra,   Your symptoms show that you may have coronavirus (COVID-19). This illness can cause fever, cough and trouble breathing. Many people get a mild case and get better on their own. Some people can get very sick.  What should I do?  We would like to test you for this virus.   1. Please call [phone] to schedule your visit. Explain that you were referred by [facility] to have a COVID-19 test. Be ready [facility].  The following will serve as your written order for this COVID Test, ordered by me, for the indication of suspected COVID [Z20.828]: The test will be ordered in [facility], our electronic health record, after you are scheduled. It will show as ordered and authorized by Tone Ponce MD.  Order: COVID-19 (Coronavirus) PCR for SYMPTOMATIC testing from [facility].      2. When it's time for your COVID test:  Stay at least 6 feet away from others. (If someone will drive you to your test, stay in the backseat, as far away from the  as you can.)   Cover your mouth and nose with a mask, tissue or washcloth.  Go straight to the testing site. Don't make any stops on the way there or back.    "   3.Starting now: Stay home and away from others (self-isolate) until:   You've had no fever---and no medicine that reduces fever---for one full day (24 hours). And...   Your other symptoms have gotten better. For example, your cough or breathing has improved. And...   At least 10 days have passed since your symptoms started.       During this time, don't leave the house except for testing or medical care.   Stay in your own room, even for meals. Use your own bathroom if you can.   Stay away from others in your home. No hugging, kissing or shaking hands. No visitors.  Don't go to work, school or anywhere else.    Clean \"high touch\" surfaces often (doorknobs, counters, handles, etc.). Use a household cleaning spray or wipes. You'll find a full list of  on the EPA website: www.epa.gov/pesticide-registration/list-n-disinfectants-use-against-sars-cov-2.   Cover your mouth and nose with a mask, tissue or washcloth to avoid spreading germs.  Wash your hands and face often. Use soap and water.  Caregivers in these groups are at risk for severe illness due to COVID-19:  o People 65 years and older  o People who live in a nursing home or long-term care facility  o People with chronic disease (lung, heart, cancer, diabetes, kidney, liver, immunologic)  o People who have a weakened immune system, including those who:   Are in cancer treatment  Take medicine that weakens the immune system, such as corticosteroids  Had a bone marrow or organ transplant  Have an immune deficiency  Have poorly controlled HIV or AIDS  Are obese (body mass index of 40 or higher)  Smoke regularly   o Caregivers should wear gloves while washing dishes, handling laundry and cleaning bedrooms and bathrooms.  o Use caution when washing and drying laundry: Don't shake dirty laundry, and use the warmest water setting that you can.  o For more tips, go to www.cdc.gov/coronavirus/2019-ncov/downloads/10Things.pdf.    4.Sign up for Joon Saldaña. We " know it's scary to hear that you might have COVID-19. We want to track your symptoms to make sure you're okay over the next 2 weeks. Please look for an email from [employer]---this is a free, online program that we'll use to keep in touch. To sign up, follow the link in the email. Learn more at [email or web address]  How can I take care of myself?   Get lots of rest. Drink extra fluids (unless a doctor has told you not to).   Take Tylenol (acetaminophen) for fever or pain. If you have liver or kidney problems, ask your family doctor if it's okay to take Tylenol.   Adults can take either:    650 mg (two 325 mg pills) every 4 to 6 hours, or...   1,000 mg (two 500 mg pills) every 8 hours as needed.    Note: Don't take more than 3,000 mg in one day. Acetaminophen is found in many medicines (both prescribed and over-the-counter medicines). Read all labels to be sure you don't take too much.   For children, check the Tylenol bottle for the right dose. The dose is based on the child's age or weight.    If you have other health problems (like cancer, heart failure, an organ transplant or severe kidney disease): Call your specialty clinic if you don't feel better in the next 2 days.       Know when to call 911. Emergency warning signs include:    Trouble breathing or shortness of breath Pain or pressure in the chest that doesn't go away Feeling confused like you haven't felt before, or not being able to wake up Bluish-colored lips or face.  Where can I get more information?    [employer] -- About COVID-19: [email or web address]   CDC -- What to Do If You're Sick: www.cdc.gov/coronavirus/2019-ncov/about/steps-when-sick.html   CDC -- Ending Home Isolation: www.cdc.gov/coronavirus/2019-ncov/hcp/disposition-in-home-patients.html   CDC -- Caring for Someone: www.cdc.gov/coronavirus/2019-ncov/if-you-are-sick/care-for-someone.html   [facility] -- Interim Guidance for Hospital Discharge to Home:  www.health.[facility].mn.us/diseases/coronavirus/hcp/hospdischarge.pdf   [facility] clinical trials (COVID-19 research studies): clinicalaffairs.[facility]/umn-clinical-trials    Below are the COVID-19 hotlines at the [facility] ([facility]). Interpreters are available.    For health questions: Call 721-341-3287 or 1-929.743.6108 (7 a.m. to 7 p.m.) For questions about schools and childcare: Call 625-256-4241 or 1-482.833.3041 (7 a.m. to 7 p.m.)    Diagnosis: Acute upper respiratory infection, unspecified  Diagnosis ICD: J06.9

## 2020-09-22 DIAGNOSIS — M54.2 CERVICALGIA: ICD-10-CM

## 2020-09-22 NOTE — TELEPHONE ENCOUNTER
Tramadol      Last Written Prescription Date:  8/26/2020  Last Fill Quantity: 120,   # refills: 0  Last Office Visit: 1/31/19  Future Office visit:    Next 5 appointments (look out 90 days)    Sep 28, 2020 11:20 AM CDT  [facility] [address] [place] [id number]  [phone]           Routing refill request to provider for review/approval because:  Drug not on the [facility], [facility] or [facility] refill protocol or controlled substance

## 2020-09-23 DIAGNOSIS — Z20.822 SUSPECTED 2019 NOVEL CORONAVIRUS INFECTION: Primary | ICD-10-CM

## 2020-09-23 RX ORDER — TRAMADOL HYDROCHLORIDE 50 MG/1
TABLET ORAL
Qty: 120 TABLET | Refills: 0 | Status: SHIPPED | OUTPATIENT
Start: 2020-09-23 | End: 2020-10-21

## 2020-09-24 DIAGNOSIS — Z20.822 SUSPECTED 2019 NOVEL CORONAVIRUS INFECTION: ICD-10-CM

## 2020-09-24 PROCEDURE — U0003 INFECTIOUS AGENT DETECTION BY NUCLEIC ACID (DNA OR RNA); SEVERE ACUTE RESPIRATORY SYNDROME CORONAVIRUS 2 (SARS-COV-2) (CORONAVIRUS DISEASE [COVID-19]), AMPLIFIED PROBE TECHNIQUE, MAKING USE OF HIGH THROUGHPUT TECHNOLOGIES AS DESCRIBED BY CMS-2020-01-R: HCPCS | Performed by: FAMILY MEDICINE

## 2020-09-25 LAB
SARS-COV-2 RNA SPEC QL NAA+PROBE: NOT DETECTED
SPECIMEN SOURCE: NORMAL

## 2020-09-28 ENCOUNTER — VIRTUAL VISIT (OUTPATIENT)
Dept: FAMILY MEDICINE | Facility: CLINIC | Age: 60
End: 2020-09-28
Payer: COMMERCIAL

## 2020-09-28 DIAGNOSIS — E03.9 HYPOTHYROIDISM, UNSPECIFIED TYPE: ICD-10-CM

## 2020-09-28 DIAGNOSIS — Z13.6 CARDIOVASCULAR SCREENING; LDL GOAL LESS THAN 160: ICD-10-CM

## 2020-09-28 DIAGNOSIS — F33.0 MAJOR DEPRESSIVE DISORDER, RECURRENT EPISODE, MILD (H): Primary | ICD-10-CM

## 2020-09-28 PROCEDURE — 99214 OFFICE O/P EST MOD 30 MIN: CPT | Mod: TEL | Performed by: FAMILY MEDICINE

## 2020-09-28 ASSESSMENT — PATIENT HEALTH QUESTIONNAIRE - PHQ9: SUM OF ALL RESPONSES TO PHQ QUESTIONS 1-9: 7

## 2020-09-28 NOTE — PROGRESS NOTES
"Jaki Gómez is a 60 year old female who is being evaluated via a billable telephone visit.      The patient has been notified of following:     \"This telephone visit will be conducted via a call between you and your physician/provider. We have found that certain health care needs can be provided without the need for a physical exam.  This service lets us provide the care you need with a short phone conversation.  If a prescription is necessary we can send it directly to your pharmacy.  If lab work is needed we can place an order for that and you can then stop by our lab to have the test done at a later time.    Telephone visits are billed at different rates depending on your insurance coverage. During this emergency period, for some insurers they may be billed the same as an in-person visit.  Please reach out to your insurance provider with any questions.    If during the course of the call the physician/provider feels a telephone visit is not appropriate, you will not be charged for this service.\"    Patient has given verbal consent for Telephone visit?  Yes    What phone number would you like to be contacted at? [phone]    How would you like to obtain your AVS? Mail a copy    Subjective     Jaki Gómez is a 60 year old female who presents via phone visit today for the following health issues:    HPI    Depression Followup    How are you doing with your depression since your last visit? No change    Are you having other symptoms that might be associated with depression? No    Have you had a significant life event?  Health Concerns     Are you feeling anxious or having panic attacks?   No    Do you have any concerns with your use of alcohol or other drugs? No    Social History     Tobacco Use     Smoking status: Current Every Day Smoker     Packs/day: 0.50     Types: Cigarettes     Smokeless tobacco: Never Used   Substance Use Topics     Alcohol use: No     Drug use: No     PHQ 4/19/2018 1/31/2019 " 6/17/2019   PHQ-9 Total Score 17 17 7   Q9: Thoughts of better off dead/self-harm past 2 weeks Not at all Not at all Not at all     RACQUEL-7 SCORE 3/8/2017 10/13/2017 1/31/2019   Total Score - - -   Total Score 5 13 9     Last PHQ-9 9/28/2020   1.  Little interest or pleasure in doing things 1   2.  Feeling down, depressed, or hopeless 1   3.  Trouble falling or staying asleep, or sleeping too much 0   4.  Feeling tired or having little energy 1   5.  Poor appetite or overeating 0   6.  Feeling bad about yourself 2   7.  Trouble concentrating 1   8.  Moving slowly or restless 1   Q9: Thoughts of better off dead/self-harm past 2 weeks 0   PHQ-9 Total Score 7   Difficulty at work, home, or with people Somewhat difficult         Suicide Assessment Five-step Evaluation and Treatment (SAFE-T)    Hypothyroidism Follow-up      Since last visit, patient describes the following symptoms: Weight stable, no hair loss, no skin changes, no constipation, no loose stools      How many servings of fruits and vegetables do you eat daily?  2-3    On average, how many sweetened beverages do you drink each day (Examples: soda, juice, sweet tea, etc.  Do NOT count diet or artificially sweetened beverages)?   4    How many days per week do you exercise enough to make your heart beat faster? 7    How many minutes a day do you exercise enough to make your heart beat faster? 60 or more    How many days per week do you miss taking your medication? 0      Presents for above concerns.  Recently she has been quite ill.  Just the COVID note testing done neck came back negative so she is very pleased.  She has been in quarantine.  No trouble with her thyroid medicine but she needs lab work done is been a long time.  Her sertraline is appropriate level.    Review of Systems   Constitutional, HEENT, cardiovascular, pulmonary, gi and gu systems are negative, except as otherwise noted.       Objective          Vitals:  No vitals were obtained today due  to virtual visit.    healthy, alert and no distress  PSYCH: Alert and oriented times 3; coherent speech, normal   rate and volume, able to articulate logical thoughts, able   to abstract reason, no tangential thoughts, no hallucinations   or delusions  Her affect is normal and pleasant PHQ 9 score is 7  RESP: No cough, no audible wheezing, able to talk in full sentences  Remainder of exam unable to be completed due to telephone visits            Assessment/Plan:    Assessment & Plan     Major depressive disorder, recurrent episode, mild (H)  Stable doing well we will refill this for another year.  - sertraline (ZOLOFT) 50 MG tablet; Take 1 tablet (50 mg) by mouth daily    Hypothyroidism, unspecified type  She will come in for thyroid testing and will wait to we get that back to refill this.  - **TSH with free T4 reflex FUTURE anytime; Future    CARDIOVASCULAR SCREENING; LDL GOAL LESS THAN 160  We will notify her with results  - Lipid panel reflex to direct LDL Fasting; Future  - **Comprehensive metabolic panel FUTURE anytime; Future     Tobacco Cessation:   reports that she has been smoking cigarettes. She has been smoking about 0.50 packs per day. She has never used smokeless tobacco.  Tobacco Cessation Action Plan: Self help information given to patient        See Patient Instructions    No follow-ups on file.    Narendra Peters MD  [facility]    Phone call duration:  6 minutes

## 2020-10-19 DIAGNOSIS — M54.2 CERVICALGIA: ICD-10-CM

## 2020-10-19 NOTE — TELEPHONE ENCOUNTER
Routing refill request to provider for review/approval because:  Drug not on the [facility] refill protocol     Requested Prescriptions   Pending Prescriptions Disp Refills     traMADol (ULTRAM) 50 MG tablet [Pharmacy Med Name: TRAMADOL HCL 50MG TABS] 120 tablet 0     Sig: TAKE ONE TABLET BY MOUTH EVERY 6 HOURS AS NEEDED       There is no refill protocol information for this order      Last Written Prescription Date:  9/23/2020  Last Fill Quantity: 120,  # refills: 0   Last office visit: 9/28/2020 with prescribing provider:     Future Office Visit:    Cervicalgia [M54.2]     Raquel Ballesteros RN

## 2020-10-21 RX ORDER — TRAMADOL HYDROCHLORIDE 50 MG/1
TABLET ORAL
Qty: 120 TABLET | Refills: 0 | Status: SHIPPED | OUTPATIENT
Start: 2020-10-21 | End: 2020-11-18

## 2020-11-16 DIAGNOSIS — M54.2 CERVICALGIA: ICD-10-CM

## 2020-11-17 NOTE — TELEPHONE ENCOUNTER
Tramadol      Last Written Prescription Date:  10/21/2020  Last Fill Quantity: 120,   # refills: 0  Last Office Visit: 9/28/2020  Future Office visit:       Routing refill request to provider for review/approval because:  Drug not on the [facility], [facility] or [facility] refill protocol or controlled substance

## 2020-11-18 RX ORDER — TRAMADOL HYDROCHLORIDE 50 MG/1
TABLET ORAL
Qty: 120 TABLET | Refills: 0 | Status: SHIPPED | OUTPATIENT
Start: 2020-11-18 | End: 2020-12-16

## 2020-11-25 DIAGNOSIS — E03.9 HYPOTHYROIDISM, UNSPECIFIED TYPE: ICD-10-CM

## 2020-11-25 RX ORDER — LEVOTHYROXINE SODIUM 100 UG/1
TABLET ORAL
Qty: 90 TABLET | Refills: 0 | Status: SHIPPED | OUTPATIENT
Start: 2020-11-25 | End: 2021-05-04

## 2020-11-25 NOTE — TELEPHONE ENCOUNTER
"Routing refill request to provider for review/approval because:  Labs not current:  TSH  T'd up 3 month for provider review.  Sending to scheduling for labs ordered as future at this time.      Requested Prescriptions   Pending Prescriptions Disp Refills     levothyroxine (SYNTHROID/LEVOTHROID) 100 MCG tablet [Pharmacy Med Name: LEVOTHYROXINE 100MCG TAB] 90 tablet 0     Sig: TAKE ONE TABLET BY MOUTH ONCE DAILY   Last Written Prescription Date:  7/29/2020  Last Fill Quantity: 90,  # refills: 0   Last office visit: 9/28/2020 with prescribing provider:     Future Office Visit:        Thyroid Protocol Failed - 11/25/2020 12:12 AM        Failed - Normal TSH on file in past 12 months     Recent Labs   Lab Test 12/10/18  1412   TSH 1.15              Passed - Patient is 12 years or older        Passed - Recent (12 mo) or future (30 days) visit within the authorizing provider's specialty     Patient has had an office visit with the authorizing provider or a provider within the authorizing providers department within the previous 12 mos or has a future within next 30 days. See \"Patient Info\" tab in inbasket, or \"Choose Columns\" in Meds & Orders section of the refill encounter.              Passed - Medication is active on med list        Passed - No active pregnancy on record     If patient is pregnant or has had a positive pregnancy test, please check TSH.          Passed - No positive pregnancy test in past 12 months     If patient is pregnant or has had a positive pregnancy test, please check TSH.           Raquel Ballesteros RN    "

## 2020-11-25 NOTE — LETTER
[address] [address]  Phone: [phone]  Fax: [phone]        November 27, 2020      Jaki Caicedoon                                                                                                                                [address] [place] [id number]            Dear Ms. Gómez,    We are concerned about your health care.  We recently provided you with a medication refill.  Many medications require routine follow-up with your Doctor.      At this time we ask that: You schedule a routine office visit with your physician to follow your care.     Your prescription: Has been refilled for 1 month so you may have time for the above noted follow-up.      Thank you,      Narendra Peters MD

## 2020-11-30 DIAGNOSIS — Z13.6 CARDIOVASCULAR SCREENING; LDL GOAL LESS THAN 160: ICD-10-CM

## 2020-11-30 DIAGNOSIS — E03.9 HYPOTHYROIDISM, UNSPECIFIED TYPE: ICD-10-CM

## 2020-11-30 LAB
ALBUMIN SERPL-MCNC: 3.6 G/DL (ref 3.4–5)
ALP SERPL-CCNC: 122 U/L (ref 40–150)
ALT SERPL W P-5'-P-CCNC: 27 U/L (ref 0–50)
ANION GAP SERPL CALCULATED.3IONS-SCNC: 7 MMOL/L (ref 3–14)
AST SERPL W P-5'-P-CCNC: 31 U/L (ref 0–45)
BILIRUB SERPL-MCNC: 0.3 MG/DL (ref 0.2–1.3)
BUN SERPL-MCNC: 5 MG/DL (ref 7–30)
CALCIUM SERPL-MCNC: 9 MG/DL (ref 8.5–10.1)
CHLORIDE SERPL-SCNC: 103 MMOL/L (ref 94–109)
CHOLEST SERPL-MCNC: 182 MG/DL
CO2 SERPL-SCNC: 28 MMOL/L (ref 20–32)
CREAT SERPL-MCNC: 0.64 MG/DL (ref 0.52–1.04)
GFR SERPL CREATININE-BSD FRML MDRD: >90 ML/MIN/{1.73_M2}
GLUCOSE SERPL-MCNC: 106 MG/DL (ref 70–99)
HDLC SERPL-MCNC: 83 MG/DL
LDLC SERPL CALC-MCNC: 78 MG/DL
NONHDLC SERPL-MCNC: 99 MG/DL
POTASSIUM SERPL-SCNC: 4.3 MMOL/L (ref 3.4–5.3)
PROT SERPL-MCNC: 7.5 G/DL (ref 6.8–8.8)
SODIUM SERPL-SCNC: 138 MMOL/L (ref 133–144)
T4 FREE SERPL-MCNC: 0.56 NG/DL (ref 0.76–1.46)
TRIGL SERPL-MCNC: 104 MG/DL
TSH SERPL DL<=0.005 MIU/L-ACNC: 16.56 MU/L (ref 0.4–4)

## 2020-11-30 PROCEDURE — 80061 LIPID PANEL: CPT | Performed by: FAMILY MEDICINE

## 2020-11-30 PROCEDURE — 36415 COLL VENOUS BLD VENIPUNCTURE: CPT | Performed by: FAMILY MEDICINE

## 2020-11-30 PROCEDURE — 84439 ASSAY OF FREE THYROXINE: CPT | Performed by: FAMILY MEDICINE

## 2020-11-30 PROCEDURE — 80053 COMPREHEN METABOLIC PANEL: CPT | Performed by: FAMILY MEDICINE

## 2020-11-30 PROCEDURE — 84443 ASSAY THYROID STIM HORMONE: CPT | Performed by: FAMILY MEDICINE

## 2020-12-09 ENCOUNTER — TELEPHONE (OUTPATIENT)
Dept: FAMILY MEDICINE | Facility: CLINIC | Age: 60
End: 2020-12-09

## 2020-12-09 DIAGNOSIS — E03.9 HYPOTHYROIDISM, UNSPECIFIED TYPE: Primary | ICD-10-CM

## 2020-12-09 RX ORDER — LEVOTHYROXINE SODIUM 112 UG/1
112 TABLET ORAL DAILY
Qty: 90 TABLET | Refills: 0 | Status: SHIPPED | OUTPATIENT
Start: 2020-12-09 | End: 2021-05-11

## 2020-12-09 NOTE — TELEPHONE ENCOUNTER
Needs NEW Rx for dosage change sent to the pharmacy ([facility]).    Also will need order for FUTURE lab in 2-3 months.

## 2020-12-09 NOTE — RESULT ENCOUNTER NOTE
Thyroid level on your last blood work was off again.  Indicating that she need a higher dose now of the Synthroid.  We will increase it to Synthroid 112 mcg up from the 100 mcg.  And we should recheck the thyroid tests in 2 to 3 months.

## 2020-12-09 NOTE — TELEPHONE ENCOUNTER
----- Message from Narendra Peters MD sent at 12/9/2020 10:59 AM CST -----  Thyroid level on your last blood work was off again.  Indicating that she need a higher dose now of the Synthroid.  We will increase it to Synthroid 112 mcg up from the 100 mcg.  And we should recheck the thyroid tests in 2 to 3 months.

## 2020-12-09 NOTE — TELEPHONE ENCOUNTER
Tried to reach patient, unable to leave message due to voicemail is not set up. Will have to try again later.    Nida Prado, CMA

## 2020-12-15 DIAGNOSIS — M54.2 CERVICALGIA: ICD-10-CM

## 2020-12-16 RX ORDER — TRAMADOL HYDROCHLORIDE 50 MG/1
TABLET ORAL
Qty: 120 TABLET | Refills: 0 | Status: SHIPPED | OUTPATIENT
Start: 2020-12-16 | End: 2021-01-11

## 2021-01-11 DIAGNOSIS — M54.2 CERVICALGIA: ICD-10-CM

## 2021-01-11 RX ORDER — TRAMADOL HYDROCHLORIDE 50 MG/1
TABLET ORAL
Qty: 120 TABLET | Refills: 0 | Status: SHIPPED | OUTPATIENT
Start: 2021-01-11 | End: 2021-02-08

## 2021-01-11 NOTE — TELEPHONE ENCOUNTER
Tramadol      Last Written Prescription Date:  12/16/2020  Last Fill Quantity: 120,   # refills: 0  Last Office Visit: 9/28/2020  Future Office visit:       Routing refill request to provider for review/approval because:  Drug not on the [facility], [facility] or [facility] refill protocol or controlled substance

## 2021-02-08 DIAGNOSIS — M54.2 CERVICALGIA: ICD-10-CM

## 2021-02-08 RX ORDER — TRAMADOL HYDROCHLORIDE 50 MG/1
TABLET ORAL
Qty: 120 TABLET | Refills: 0 | Status: SHIPPED | OUTPATIENT
Start: 2021-02-08 | End: 2021-03-08

## 2021-02-08 NOTE — TELEPHONE ENCOUNTER
Requested Prescriptions   Pending Prescriptions Disp Refills     traMADol (ULTRAM) 50 MG tablet [Pharmacy Med Name: TRAMADOL HCL 50MG TABS] 120 tablet 0     Sig: TAKE ONE TABLET BY MOUTH EVERY 6 HOURS AS NEEDED     Last Written Prescription Date:  01/11/2021  Last Fill Quantity: 120,   # refills: 0  Last Office Visit: 09/28/2020  Future Office visit:       Routing refill request to provider for review/approval because:  Drug not on the [facility], [facility] or [facility] refill protocol or controlled substance    Mila Gaytan MA

## 2021-03-08 DIAGNOSIS — M54.2 CERVICALGIA: ICD-10-CM

## 2021-03-08 RX ORDER — TRAMADOL HYDROCHLORIDE 50 MG/1
TABLET ORAL
Qty: 120 TABLET | Refills: 0 | Status: SHIPPED | OUTPATIENT
Start: 2021-03-08 | End: 2021-04-06

## 2021-03-08 NOTE — TELEPHONE ENCOUNTER
Requested Prescriptions   Pending Prescriptions Disp Refills     traMADol (ULTRAM) 50 MG tablet [Pharmacy Med Name: TRAMADOL HCL 50MG TABS] 120 tablet 0     Sig: TAKE ONE TABLET BY MOUTH EVERY 6 HOURS AS NEEDED     Last Written Prescription Date:  02/08/2021  Last Fill Quantity: 120,   # refills: 0  Last Office Visit: 09/28/2020  Future Office visit:       Routing refill request to provider for review/approval because:  Drug not on the [facility], [facility] or [facility] refill protocol or controlled substance    Mila Gaytan MA

## 2021-04-06 DIAGNOSIS — M54.2 CERVICALGIA: ICD-10-CM

## 2021-04-06 RX ORDER — TRAMADOL HYDROCHLORIDE 50 MG/1
TABLET ORAL
Qty: 120 TABLET | Refills: 0 | Status: SHIPPED | OUTPATIENT
Start: 2021-04-06 | End: 2021-05-05

## 2021-04-06 NOTE — TELEPHONE ENCOUNTER
Requested Prescriptions   Pending Prescriptions Disp Refills     traMADol (ULTRAM) 50 MG tablet [Pharmacy Med Name: TRAMADOL HCL 50MG TABS] 120 tablet 0     Sig: TAKE ONE TABLET BY MOUTH EVERY 6 HOURS AS NEEDED     Last Written Prescription Date:  03/08/2021  Last Fill Quantity: 120,   # refills: 0  Last Office Visit: 09/28/2020  Future Office visit:       Routing refill request to provider for review/approval because:  Drug not on the [facility], [facility] or [facility] refill protocol or controlled substance    Mila Gaytan MA

## 2021-04-08 ENCOUNTER — IMMUNIZATION (OUTPATIENT)
Dept: FAMILY MEDICINE | Facility: CLINIC | Age: 61
End: 2021-04-08
Payer: COMMERCIAL

## 2021-04-08 PROCEDURE — 91301 PR COVID VAC MODERNA 100 MCG/0.5 ML IM: CPT

## 2021-04-08 PROCEDURE — 0011A PR COVID VAC MODERNA 100 MCG/0.5 ML IM: CPT

## 2021-05-04 ENCOUNTER — APPOINTMENT (OUTPATIENT)
Dept: GENERAL RADIOLOGY | Facility: CLINIC | Age: 61
End: 2021-05-04
Attending: EMERGENCY MEDICINE
Payer: COMMERCIAL

## 2021-05-04 ENCOUNTER — HOSPITAL ENCOUNTER (EMERGENCY)
Facility: CLINIC | Age: 61
Discharge: HOME | End: 2021-05-04
Attending: EMERGENCY MEDICINE | Admitting: EMERGENCY MEDICINE
Payer: COMMERCIAL

## 2021-05-04 ENCOUNTER — NURSE TRIAGE (OUTPATIENT)
Dept: NURSING | Facility: CLINIC | Age: 61
End: 2021-05-04

## 2021-05-04 VITALS
RESPIRATION RATE: 20 BRPM | DIASTOLIC BLOOD PRESSURE: 76 MMHG | TEMPERATURE: 97.9 F | HEIGHT: 61 IN | WEIGHT: 110 LBS | SYSTOLIC BLOOD PRESSURE: 123 MMHG | HEART RATE: 112 BPM | OXYGEN SATURATION: 99 % | BODY MASS INDEX: 20.77 KG/M2

## 2021-05-04 DIAGNOSIS — S59.911A FOREARM INJURY, RIGHT, INITIAL ENCOUNTER: ICD-10-CM

## 2021-05-04 PROCEDURE — 73110 X-RAY EXAM OF WRIST: CPT | Mod: RT

## 2021-05-04 PROCEDURE — 99285 EMERGENCY DEPT VISIT HI MDM: CPT | Performed by: EMERGENCY MEDICINE

## 2021-05-04 PROCEDURE — 73130 X-RAY EXAM OF HAND: CPT | Mod: RT

## 2021-05-04 PROCEDURE — 73090 X-RAY EXAM OF FOREARM: CPT | Mod: RT

## 2021-05-04 PROCEDURE — 73080 X-RAY EXAM OF ELBOW: CPT | Mod: RT

## 2021-05-04 PROCEDURE — 99284 EMERGENCY DEPT VISIT MOD MDM: CPT | Performed by: EMERGENCY MEDICINE

## 2021-05-04 ASSESSMENT — MIFFLIN-ST. JEOR: SCORE: 1006.34

## 2021-05-04 NOTE — ED TRIAGE NOTES
Was reaching up to grab something and fell forward onto her right shoulder and arm 5 days ago.  Complains of right sided neck, shoulder and arm pain.  Unable to perform ROM on right arm.  Right wrist appears deformed, swollen and red.

## 2021-05-04 NOTE — ED PROVIDER NOTES
History     Chief Complaint   Patient presents with     Arm Injury     HPI  Jaki Gómez is a 60 year old female who presents after right arm trauma that occurred 4 days ago.  She has not been seen for this yet.  She was in the home lifting a box that weighed approximately 80 pounds off a shelf when it pulled her arm downward creating pain.  She is developed significant swelling and pain about the forearm, hand and elbow region.  She did not fall to the ground.  She has some neck discomfort.  No other significant injury.    Allergies:  Allergies   Allergen Reactions     Cymbalta Nausea     Sulfa Drugs Swelling       Problem List:    Patient Active Problem List    Diagnosis Date Noted     Lumbar back pain with radiculopathy affecting left lower extremity 05/09/2018     Priority: Medium     Hypothyroidism, unspecified type 12/01/2016     Priority: Medium     Major depressive disorder, recurrent episode, mild (H) 03/04/2016     Priority: Medium     Duodenal ulcer with hemorrhage 02/12/2016     Priority: Medium     Chronic neck pain 02/12/2016     Priority: Medium     Chronic back pain 02/12/2016     Priority: Medium     Iron deficiency anemia due to chronic blood loss 02/12/2016     Priority: Medium     Thrombocytosis (H) 02/12/2016     Priority: Medium     Tobacco use disorder 02/12/2016     Priority: Medium     Community acquired pneumonia - left lung, recently treated 02/12/2016     Priority: Medium     Gastric ulcer 02/12/2016     Priority: Medium     Cervicalgia 01/22/2016     Priority: Medium     Segmental dysfunction of cervical region 01/22/2016     Priority: Medium     Segmental dysfunction of thoracic region 01/22/2016     Priority: Medium     Segmental dysfunction of lumbar region 01/22/2016     Priority: Medium     Brachial neuritis or radiculitis 01/22/2016     Priority: Medium     CARDIOVASCULAR SCREENING; LDL GOAL LESS THAN 160 10/31/2010     Priority: Medium     Multiple sclerosis (H)       Priority: Medium     Hemorrhage of gastrointestinal tract 2003     Priority: Medium     Problem list name updated by automated process. Provider to review          Past Medical History:    Past Medical History:   Diagnosis Date     Lumbago      Multiple sclerosis (H)      Unspecified hypothyroidism        Past Surgical History:    Past Surgical History:   Procedure Laterality Date     ESOPHAGOSCOPY, GASTROSCOPY, DUODENOSCOPY (EGD), COMBINED N/A 2016    Procedure: COMBINED ESOPHAGOSCOPY, GASTROSCOPY, DUODENOSCOPY (EGD);  Surgeon: Walter Rodriguez MD;  Location: [facility]     ESOPHAGOSCOPY, GASTROSCOPY, DUODENOSCOPY (EGD), COMBINED N/A 2/15/2016    Procedure: COMBINED ESOPHAGOSCOPY, GASTROSCOPY, DUODENOSCOPY (EGD);  Surgeon: Narendra Mendoza MD;  Location: [facility]     HC CLOSED TX TIBIAL SHAFT FX W/O MANIPULATION  2003    Closed reduction and long leg cast application of left tib-fib fx.     HC INJ EPIDURAL LUMBAR/SACRAL W/WO CONTRAST  2006    Steroid injection L4-5     HC TREATMENT MISSED  SURG, 1ST TRIMESTER      Suction dilatation and curettage       Family History:    Family History   Problem Relation Age of Onset     Hypertension Maternal Grandfather      Arthritis Maternal Grandfather      Cerebrovascular Disease Maternal Uncle      Arthritis Maternal Grandmother      Musculoskeletal Disorder Maternal Uncle      Thyroid Disease Sister        Social History:  Marital Status:   [2]  Social History     Tobacco Use     Smoking status: Current Every Day Smoker     Packs/day: 0.50     Types: Cigarettes     Smokeless tobacco: Never Used   Substance Use Topics     Alcohol use: No     Drug use: No        Medications:    acetaminophen (TYLENOL) 325 MG tablet  levothyroxine (SYNTHROID/LEVOTHROID) 112 MCG tablet  Multiple Vitamins-Minerals (MULTIVITAMIN ADULT PO)  Pseudoephedrine HCl (SUDAFED PO)  sertraline (ZOLOFT) 50 MG tablet  traMADol (ULTRAM) 50 MG tablet          Review of  "Systems  All other systems are reviewed and are negative    Physical Exam   BP: 123/76  Pulse: 112  Temp: 97.9  F (36.6  C)  Resp: 20  Height: 154.9 cm (5' 1\")  Weight: 49.9 kg (110 lb)  SpO2: 99 %      Physical Exam  Constitutional:       General: She is not in acute distress.     Appearance: She is not diaphoretic.   HENT:      Head: Normocephalic and atraumatic.      Nose: Nose normal.      Mouth/Throat:      Mouth: Mucous membranes are moist.      Pharynx: Oropharynx is clear.   Eyes:      Conjunctiva/sclera: Conjunctivae normal.   Cardiovascular:      Rate and Rhythm: Tachycardia present.      Heart sounds: Normal heart sounds.   Pulmonary:      Effort: No respiratory distress.      Breath sounds: Normal breath sounds.   Chest:      Chest wall: No tenderness.   Abdominal:      General: Bowel sounds are normal.      Palpations: Abdomen is soft.      Tenderness: There is no abdominal tenderness.   Musculoskeletal:      Cervical back: She exhibits no tenderness.      Thoracic back: She exhibits no tenderness.      Lumbar back: She exhibits no tenderness.      Comments: Right arm is remarkably swollen in the regions of the elbow, distal forearm and hand.  There is tenderness to palpation of the elbow, distal forearm and hand as well.  There is some paresthesia to the hand.  However circulation is intact to the fingers.  Decreased range of motion to the hand, elbow and wrist.  Other extremities move well without signs of significant injury.   Skin:     Findings: No abrasion or laceration.   Neurological:      Mental Status: She is alert and oriented to person, place, and time.         ED Course        Procedures               Critical Care time:  none               Results for orders placed or performed during the hospital encounter of 05/04/21 (from the past 24 hour(s))   XR Elbow Right G/E 3 Views    Narrative    ELBOW RIGHT THREE OR MORE VIEWS, WRIST RIGHT THREE OR MORE VIEWS, HAND  RIGHT THREE OR MORE VIEWS, " FOREARM RIGHT TWO VIEWS   5/4/2021 3:13 PM     HISTORY:  Trauma.    FINDINGS: Chondrocalcinosis of the elbow, wrist, and first-third MCP  joints, consistent with calcium pyrophosphate deposition disease.  Cystic change within the lunate.      Impression    IMPRESSION: No acute fracture identified.    ALANA AMOS MD   Radius/Ulna XR, PA & LAT, right    Narrative    ELBOW RIGHT THREE OR MORE VIEWS, WRIST RIGHT THREE OR MORE VIEWS, HAND  RIGHT THREE OR MORE VIEWS, FOREARM RIGHT TWO VIEWS   5/4/2021 3:13 PM     HISTORY:  Trauma.    FINDINGS: Chondrocalcinosis of the elbow, wrist, and first-third MCP  joints, consistent with calcium pyrophosphate deposition disease.  Cystic change within the lunate.      Impression    IMPRESSION: No acute fracture identified.    ALANA AMOS MD   XR Hand Right 3 Views    Narrative    ELBOW RIGHT THREE OR MORE VIEWS, WRIST RIGHT THREE OR MORE VIEWS, HAND  RIGHT THREE OR MORE VIEWS, FOREARM RIGHT TWO VIEWS   5/4/2021 3:13 PM     HISTORY:  Trauma.    FINDINGS: Chondrocalcinosis of the elbow, wrist, and first-third MCP  joints, consistent with calcium pyrophosphate deposition disease.  Cystic change within the lunate.      Impression    IMPRESSION: No acute fracture identified.    ALANA AMOS MD   XR Wrist Right 3 Views    Narrative    ELBOW RIGHT THREE OR MORE VIEWS, WRIST RIGHT THREE OR MORE VIEWS, HAND  RIGHT THREE OR MORE VIEWS, FOREARM RIGHT TWO VIEWS   5/4/2021 3:13 PM     HISTORY:  Trauma.    FINDINGS: Chondrocalcinosis of the elbow, wrist, and first-third MCP  joints, consistent with calcium pyrophosphate deposition disease.  Cystic change within the lunate.      Impression    IMPRESSION: No acute fracture identified.    ALANA AMOS MD       Medications - No data to display    Assessments & Plan (with Medical Decision Making)  60-year-old female that presents with a right forearm and elbow injury as described above.  X-rays negative.  Appears to have some significant soft tissue  strain.  Is placed in a sling.  Is set up for follow-up in orthopedics in 3 days.  Should return anytime sooner if fever or any other concern develops.     I have reviewed the nursing notes.    I have reviewed the findings, diagnosis, plan and need for follow up with the patient.       Discharge Medication List as of 5/4/2021  4:23 PM          Final diagnoses:   Forearm injury, right, initial encounter       5/4/2021   [facility] EMERGENCY DEPT     Tip Delaney MD  05/04/21 2038

## 2021-05-04 NOTE — TELEPHONE ENCOUNTER
Patient reports that she was lifting a heavy board on Friday and fell. She believes that she injured her neck and arm on the right side. Currently she rates pain a 9-10. Her entire arm is swollen. She reports that she can move the arm if she forces it but it is very painful. Patient also is having pain in her neck and is unable to move her neck. Patient's left arm is numb and tingling.    Patient is advised on ED visit at this time per protocol. Patient verbalizes understanding,  is able to take her. She denies further questions at this time.     Tarsha Beach RN  [facility]    Reason for Disposition    Numbness, tingling, or burning of arms, upper back/chest or legs and not present now (i.e., completely resolved)    Additional Information    Negative: Shock suspected (e.g., cold/pale/clammy skin, too weak to stand, low BP, rapid pulse)    Negative: Similar pain previously and it was from 'heart attack'    Negative: Similar pain previously from 'angina' and not relieved by nitroglycerin    Negative: Sounds like a life-threatening emergency to the triager    Negative: Major bleeding (actively dripping or spurting) that can't be stopped    Negative: Sounds like a life-threatening emergency to the triager    Negative: Wound looks infected    Negative: Arm pain from overuse (e.g., sports, lifting, physical work)    Negative: Arm pain not from an injury    Negative: Shoulder injury is main concern    Negative: Elbow injury is main concern    Negative: Hand or wrist injury is main concern    Negative: Bullet wound, stabbed by knife, or other serious penetrating wound    Negative: Looks like a broken bone or dislocated joint (crooked or deformed)    Negative: Dangerous mechanism of injury (e.g., MVA, contact sports, diving, fall on trampoline, fall > 10 feet or 3 meters) (Exception: neck pain began > 1 hour after injury)    Negative: Weakness of arms or legs    Negative: Numbness, tingling,  or burning of arms, upper back/chest or legs (Exception: brief, now gone)    Negative: Major bleeding (actively dripping or spurting) that can't be stopped    Negative: Bullet, knife or other serious penetrating wound    Negative: Difficulty breathing (e.g., choking or stridor)    Negative: Knocked out (unconscious) > 1 minute    Negative: Direct blow to front of neck and cough, hoarseness, abnormal voice, or can't talk    Negative: Sounds like a serious injury to the triager    Negative: Sounds like a life-threatening emergency to the triager    Negative: Neck pain not from an injury    Negative: Dangerous mechanism of injury (e.g., MVA, contact sports, diving, fall on trampoline, fall > 10 feet or 3 meters) and neck pain or stiffness began > 1 hour after injury    Can't move injured arm at all    Protocols used: NECK INJURY-A-OH, ARM PAIN-A-OH, ARM INJURY-A-OH    COVID 19 Nurse Triage Plan/Patient Instructions    Please be aware that novel coronavirus (COVID-19) may be circulating in the community. If you develop symptoms such as fever, cough, or SOB or if you have concerns about the presence of another infection including coronavirus (COVID-19), please contact your health care provider or visit [email or web address].     Disposition/Instructions    ED Visit recommended. Follow protocol based instructions.     Bring Your Own Device:  Please also bring your smart device(s) (smart phones, tablets, laptops) and their charging cables for your personal use and to communicate with your care team during your visit.    Thank you for taking steps to prevent the spread of this virus.  o Limit your contact with others.  o Wear a simple mask to cover your cough.  o Wash your hands well and often.    Resources    [facility]: About COVID-19: [email or web address]    CDC: What to Do If You're Sick: www.cdc.gov/coronavirus/2019-ncov/about/steps-when-sick.html    CDC: Ending Home Isolation:  www.cdc.gov/coronavirus/2019-ncov/hcp/disposition-in-home-patients.html     CDC: Caring for Someone: www.cdc.gov/coronavirus/2019-ncov/if-you-are-sick/care-for-someone.html     [facility]: Interim Guidance for Hospital Discharge to Home: www.[facility]/diseases/coronavirus/hcp/hospdischarge.pdf    [facility] clinical trials (COVID-19 research studies): [email or web address]/n-clinical-trials     Below are the COVID-19 hotlines at the [place] [facility] ([facility]). Interpreters are available.   o For health questions: Call 155-975-6531 or 1-779.262.1606 (7 a.m. to 7 p.m.)  o For questions about schools and childcare: Call 051-478-8291 or 1-396.495.8913 (7 a.m. to 7 p.m.)

## 2021-05-04 NOTE — DISCHARGE INSTRUCTIONS
Use sling as needed over the next 3 days.  Ice and rest the arm.  May use Tylenol up to 1000 mg 4 times a day as needed for pain.  May also use ibuprofen up to 400 mg 4 times a day.

## 2021-05-07 ENCOUNTER — OFFICE VISIT (OUTPATIENT)
Dept: ORTHOPEDICS | Facility: CLINIC | Age: 61
End: 2021-05-07
Payer: COMMERCIAL

## 2021-05-07 VITALS — BODY MASS INDEX: 19.37 KG/M2 | WEIGHT: 102.5 LBS

## 2021-05-07 DIAGNOSIS — S69.91XA INJURY OF RIGHT WRIST, INITIAL ENCOUNTER: ICD-10-CM

## 2021-05-07 DIAGNOSIS — M99.01 SEGMENTAL DYSFUNCTION OF CERVICAL REGION: Primary | ICD-10-CM

## 2021-05-07 DIAGNOSIS — S56.911A STRAIN OF ELBOW AND FOREARM, RIGHT, INITIAL ENCOUNTER: ICD-10-CM

## 2021-05-07 DIAGNOSIS — S46.911A SHOULDER STRAIN, RIGHT, INITIAL ENCOUNTER: ICD-10-CM

## 2021-05-07 PROCEDURE — 99203 OFFICE O/P NEW LOW 30 MIN: CPT | Mod: 25 | Performed by: PHYSICIAN ASSISTANT

## 2021-05-07 PROCEDURE — 20610 DRAIN/INJ JOINT/BURSA W/O US: CPT | Mod: RT | Performed by: PHYSICIAN ASSISTANT

## 2021-05-07 RX ORDER — BETAMETHASONE SODIUM PHOSPHATE AND BETAMETHASONE ACETATE 3; 3 MG/ML; MG/ML
12 INJECTION, SUSPENSION INTRA-ARTICULAR; INTRALESIONAL; INTRAMUSCULAR; SOFT TISSUE
Status: SHIPPED | OUTPATIENT
Start: 2021-05-07

## 2021-05-07 RX ADMIN — BETAMETHASONE SODIUM PHOSPHATE AND BETAMETHASONE ACETATE 12 MG: 3; 3 INJECTION, SUSPENSION INTRA-ARTICULAR; INTRALESIONAL; INTRAMUSCULAR; SOFT TISSUE at 13:05

## 2021-05-07 NOTE — PROGRESS NOTES
Large Joint Injection/Arthocentesis: R subacromial bursa    Date/Time: 5/7/2021 1:05 PM  Performed by: Marti Smith PA-C  Authorized by: Marti Smith PA-C     Indications:  Pain  Needle Size:  25 G  Guidance: landmark guided    Approach:  Posterolateral  Location:  Shoulder      Site:  R subacromial bursa  Medications:  12 mg betamethasone acet & sod phos 6 (3-3) MG/ML  Outcome:  Tolerated well, no immediate complications  Procedure discussed: discussed risks, benefits, and alternatives    Consent Given by:  Patient  Timeout: timeout called immediately prior to procedure    Prep: patient was prepped and draped in usual sterile fashion

## 2021-05-07 NOTE — PROGRESS NOTES
ORTHOPEDIC CLINIC CONSULT      Chief Complaint: Jaki Gómez is a 60 year old female who is being seen for   Chief Complaint   Patient presents with     Musculoskeletal Problem     right arm injury- DOI: 4/30/2021     Consult     ref: ED     Referred by: Patient was seen in the emergency department on May 4, 2021    Follow-up with continued arm swelling status post right arm injury     Patient states the date of injury as Thursday, April 29, 2021.  Patient states she did not seek treatment to the ER until Tuesday, May 4, 2021.  She states the swelling began on April 30, 2021.  Patient states she was pulling a heavy box off the shelf which is approximately 80 pounds.  As the box came down it did jar her right arm.  She states she is still experiencing some cervical neck pain, right shoulder pain, right elbow and right hand and wrist pain.  She has had swelling between the elbow and her hand since this date.  She has been elevating above the heart when possible.  She has utilized both ice and heat.  She reports that he has felt better to alleviate her pain.  She does take tramadol for her MS.  She also utilizes Tylenol.  She is unable to take any NSAIDs related to upset stomach and difficulty eating.    Hand Dominance:  right   Workman's Comp: No  Auto Accident Injury: No    History of Present Illness:     Location: right neck, shoulder, upper arm, elbow, forearm, wrist, hand   When the pain began:   1 week(s) ago  Mechanism of Injury: Strain from pulling down a box off the shelf  Level of Pain:8/10  Pain Quality: aching, sharp, pressure and throbbing The pain has never improved since onset.  Pain is better with: Rest, Ice, Heat, Tylenol, Tramadol and Other medications: Mobilizing fingers  Pain is worse with:  Range of motion  Similar problems: No previous problem in the affected area.  Treatment so far consists of:  rest, Ice, Heat, Tylenol, tramadol .   Associated Features: Swelling  Limitations due to  condition:  Pain is limiting normal activities of daily living.    Any other information important in HPI: See above.  Patient does have a history of MS    Patient's past medical, surgical, social and family histories reviewed.     Past Medical History:   Diagnosis Date     Lumbago      Multiple sclerosis (H)      Unspecified hypothyroidism     Hypothyroidism       Past Surgical History:   Procedure Laterality Date     ESOPHAGOSCOPY, GASTROSCOPY, DUODENOSCOPY (EGD), COMBINED N/A 2016    Procedure: COMBINED ESOPHAGOSCOPY, GASTROSCOPY, DUODENOSCOPY (EGD);  Surgeon: Walter Rodriguez MD;  Location: [facility]     ESOPHAGOSCOPY, GASTROSCOPY, DUODENOSCOPY (EGD), COMBINED N/A 2/15/2016    Procedure: COMBINED ESOPHAGOSCOPY, GASTROSCOPY, DUODENOSCOPY (EGD);  Surgeon: Narendra Mendoza MD;  Location: [facility]     HC CLOSED TX TIBIAL SHAFT FX W/O MANIPULATION  2003    Closed reduction and long leg cast application of left tib-fib fx.     HC INJ EPIDURAL LUMBAR/SACRAL W/WO CONTRAST  2006    Steroid injection L4-5     HC TREATMENT MISSED  SURG, 1ST TRIMESTER      Suction dilatation and curettage       Home Medications:  Prior to Admission medications    Medication Sig Start Date End Date Taking? Authorizing Provider   acetaminophen (TYLENOL) 325 MG tablet Take 975 mg by mouth every 8 hours as needed for pain  2/15/16   Crista Marino MD   levothyroxine (SYNTHROID/LEVOTHROID) 112 MCG tablet Take 1 tablet (112 mcg) by mouth daily 20   Narendra Peters MD   Multiple Vitamins-Minerals (MULTIVITAMIN ADULT PO) Take 1 tablet by mouth daily    Reported, Patient   Pseudoephedrine HCl (SUDAFED PO) Take 60 mg by mouth every 6 hours as needed     Reported, Patient   sertraline (ZOLOFT) 50 MG tablet Take 1 tablet (50 mg) by mouth daily 20   Narendra Peters MD   traMADol (ULTRAM) 50 MG tablet Take 1 tablet (50 mg) by mouth every 6 hours as needed for pain 21   Narendra Peters MD        Allergies   Allergen Reactions     Cymbalta Nausea     Sulfa Drugs Swelling       Social History     Occupational History     Not on file   Tobacco Use     Smoking status: Current Every Day Smoker     Packs/day: 0.50     Types: Cigarettes     Smokeless tobacco: Never Used   Substance and Sexual Activity     Alcohol use: No     Drug use: No     Sexual activity: Yes     Partners: Male       Family History   Problem Relation Age of Onset     Hypertension Maternal Grandfather      Arthritis Maternal Grandfather      Cerebrovascular Disease Maternal Uncle      Arthritis Maternal Grandmother      Musculoskeletal Disorder Maternal Uncle      Thyroid Disease Sister        REVIEW OF SYSTEMS    General: Negative for fever or fatigue    Psych:  positive anxiety or depression     Ophthalmic:  Corrective lenses?  No    ENT:  Hearing difficulty? No    CV: negative for chest pain, venous insufficiency, no history of MI or stroke    Endocrine:  negative diabetes     Urology:  negative kidney disease    Resp:  Normal respiratory effort, no history of pulmonary disease or asthma    Skin: negative for cuts/sores or redness    Musculoskeletal: as above, multiple sclerosis    Neurologic:negative for numbness/tingling    Hematologic: negative for bleeding disorder, does not use of prescription anticoagulants       Physical Exam:    Vitals: There were no vitals taken for this visit.  Patient's most recent vitals were taken on 5/4/2021 with blood pressure being within normal limits.  A weight however was done at this visit since she states her last 1 was verbal guess of her weight.  Patient's current weight is 102.5 pounds measured in clinic today. Wt 46.5 kg (102 lb 8 oz)   BMI 19.37 kg/m      GENERAL APPEARANCE: Healthy, alert, no distress    SKIN:  negative for suspicious lesions or rashes    NEURO: Normal strength and tone, mentation intact and speech normal    PSYCH:   Mentation appears Normal and affect  normal/bright    RESPIRATORY: negative for respiratory distress.    EYES: negative for Conjunctivitis    Cardiovascular: No vascular discoloration;  Fingers cool in the right hand.  There is no blue coloring.    JOINT/EXTREMITIES:  right   SHOULDER Exam-Right   Inspection: no swelling, no bruising, no discoloration, no obvious deformity, no asymmetry, no glenohumeral joint anterior bulge, no distal clavicle elevation, no muscle atrophy, no scapular winging   Tenderness of: Patient is tender along the top and posterior portions of the shoulder girdle.  This is not specified to any portion of the joint.  This appears more musculature in nature to where her pain is present   Range of Motion: Active-patient is able to draw her right shoulder up into a full 180 arc of motion in abduction and forward flexion however it takes her time to do this and she does this awkward manner for a circular manner versus the direct planes.   Strength: Strength is decreased in abduction secondary to pain     Elbow Exam: Inspection: swelling present but no significant bruising   tender: Patient with no specific site tenderness.  She has generalized tenderness.  Range of Motion: Patient was symmetrical flexion and extension at the elbow.  She does not have full extension at the right elbow with approximately a 20 degree extension contracture.  Her left arm has approximately a 10 degree extension contracture.  Special tests: Patient has more laxity of the medial collateral ligament but when compared to the left side this appears symmetrical.  There is appropriate tension of the lateral collateral ligament:     Wrist Exam: WRIST:  Inspection: swelling present of the hand and the wrist.  Palpation: Tender: Directly over the radius and the radial ulnar joint.  Range of Motion: Radial and ulnar deviation is painful to the patient.  Patient has appropriate abilities for flexion to approximately 45 degrees which is equal and symmetrical to her  left.  She has just a few degrees of extension ability of the right hand which is approximately 20 degrees different from her left.    Hand/Finger Exam: Inspection: There is considerable swelling of the hand.  Tender: There are no specific sites of the hands or the fingers with tenderness or bruising  Range of Motion: Patient is able to mobilize all her fingers and pull them into a     Cervical Spine Exam: Inspection: no scapular winging, no bruising present  Tender: No tenderness over the spiny prominences  Patient does have tenderness of the muscle groups surrounding this cervical spine  Range of Motion:  flexion:  full, extension: decreased, painful, left lateral rotation:  decreased, right lateral rotation:  decreased, painful    GAIT: Nonantalgic gait    Radiographs:   Recent Results (from the past 744 hour(s))   XR Elbow Right G/E 3 Views    Narrative    ELBOW RIGHT THREE OR MORE VIEWS, WRIST RIGHT THREE OR MORE VIEWS, HAND  RIGHT THREE OR MORE VIEWS, FOREARM RIGHT TWO VIEWS   5/4/2021 3:13 PM     HISTORY:  Trauma.    FINDINGS: Chondrocalcinosis of the elbow, wrist, and first-third MCP  joints, consistent with calcium pyrophosphate deposition disease.  Cystic change within the lunate.      Impression    IMPRESSION: No acute fracture identified.    ALANA AMOS MD   Radius/Ulna XR, PA & LAT, right    Narrative    ELBOW RIGHT THREE OR MORE VIEWS, WRIST RIGHT THREE OR MORE VIEWS, HAND  RIGHT THREE OR MORE VIEWS, FOREARM RIGHT TWO VIEWS   5/4/2021 3:13 PM     HISTORY:  Trauma.    FINDINGS: Chondrocalcinosis of the elbow, wrist, and first-third MCP  joints, consistent with calcium pyrophosphate deposition disease.  Cystic change within the lunate.      Impression    IMPRESSION: No acute fracture identified.    ALANA AMOS MD   XR Hand Right 3 Views    Narrative    ELBOW RIGHT THREE OR MORE VIEWS, WRIST RIGHT THREE OR MORE VIEWS, HAND  RIGHT THREE OR MORE VIEWS, FOREARM RIGHT TWO VIEWS   5/4/2021 3:13 PM      HISTORY:  Trauma.    FINDINGS: Chondrocalcinosis of the elbow, wrist, and first-third MCP  joints, consistent with calcium pyrophosphate deposition disease.  Cystic change within the lunate.      Impression    IMPRESSION: No acute fracture identified.    ALANA AMOS MD   XR Wrist Right 3 Views    Narrative    ELBOW RIGHT THREE OR MORE VIEWS, WRIST RIGHT THREE OR MORE VIEWS, HAND  RIGHT THREE OR MORE VIEWS, FOREARM RIGHT TWO VIEWS   5/4/2021 3:13 PM     HISTORY:  Trauma.    FINDINGS: Chondrocalcinosis of the elbow, wrist, and first-third MCP  joints, consistent with calcium pyrophosphate deposition disease.  Cystic change within the lunate.      Impression    IMPRESSION: No acute fracture identified.    ALANA AMOS MD     All the above x-rays taken in the emergency department were reviewed.  There is no fractures identified.  Patient does have numerous calcifications present.  Independent visualization of the films was made.     Impression:      ICD-10-CM    1. Segmental dysfunction of cervical region  M99.01 PHYSICAL THERAPY REFERRAL   2. Shoulder strain, right, initial encounter  S46.911A PHYSICAL THERAPY REFERRAL   3. Strain of elbow and forearm, right, initial encounter  S56.911A OCCUPATIONAL THERAPY REFERRAL   4. Injury of right wrist, initial encounter  S69.91XA OCCUPATIONAL THERAPY REFERRAL   Patient sustained injury to her right arm on 4/29/2021.  There are no fractures present however patient does have very visible swelling of the elbow and the wrist.  On examination there is no clear-cut ligament laxity or disruption identified.  Patient appears to have strained the shoulder, elbow, wrist.  Patient also with cervical neck pain that is new since injury.    Plan:  All of the above pertinent physical exam and imaging modalities findings was reviewed with Jaki.                                          CONSERVATIVE CARE:  I recommend conservative care for the patient to include Tramadol, Tylenol, activity  modifications, rest, no pushing, pulling, and/or lifting.  Patient should discontinue use of the sling provided by the emergency department.  Patient was provided Tubigrip to attempt to reduce the swelling of the right lower arm.  Tubigrip should extend from above her elbow to the base of her fingers.  Patient is also asked to elevate above heart level when possible.  Patient is also advised that ice will reduce the swelling more so than the heat.  If she does utilize the heat she should follow-up with ice.  Patient has been scheduled for physical therapy to address cervical neck and shoulder pain  Patient is also been scheduled for occupational therapy to address elbow to hand swelling and pain                                          INJECTION PROCEDURE:  The patient was counseled about an  injection, including discussion of risks (including infection), contents of the injection, rationale for performing the injection, and expected benefits of the injection. The skin was prepped with alcohol and betadine and then utilizing sterile technique an injection of the right shoulder subacromial space from the posterolateral approach  was performed. The injection consisted of 2 mL of betamethasone and 6 mL of a mix of 1% lidocaine and 0.5% Marcaine. The patient tolerated the injection well, and there were no complications. The injection site was covered with a Band-Aid. The injection was performed by AMEYA Abad.      Return to clinic in 10 to 14 days.  We will reassess patient's pain levels, swelling, collateral ligament integrity of all the above.  She may require further imaging in the form of MRI based on examination from next appointment.    BP Readings from Last 1 Encounters:   05/04/21 123/76     Patient declined BP in clinic today    Marti Smith PA-C   5/7/2021  11:25 AM

## 2021-05-07 NOTE — LETTER
5/7/2021         RE: Jaki Gómez  [address]  [facility] [address]        Dear Colleague,    Thank you for referring your patient, Jaki Gómez, to the [facility]. Please see a copy of my visit note below.    ORTHOPEDIC CLINIC CONSULT      Chief Complaint: Jaki Gómez is a 60 year old female who is being seen for   Chief Complaint   Patient presents with     Musculoskeletal Problem     right arm injury- DOI: 4/30/2021     Consult     ref: ED     Referred by: Patient was seen in the emergency department on May 4, 2021    Follow-up with continued arm swelling status post right arm injury     Patient states the date of injury as Thursday, April 29, 2021.  Patient states she did not seek treatment to the ER until Tuesday, May 4, 2021.  She states the swelling began on April 30, 2021.  Patient states she was pulling a heavy box off the shelf which is approximately 80 pounds.  As the box came down it did jar her right arm.  She states she is still experiencing some cervical neck pain, right shoulder pain, right elbow and right hand and wrist pain.  She has had swelling between the elbow and her hand since this date.  She has been elevating above the heart when possible.  She has utilized both ice and heat.  She reports that he has felt better to alleviate her pain.  She does take tramadol for her MS.  She also utilizes Tylenol.  She is unable to take any NSAIDs related to upset stomach and difficulty eating.    Hand Dominance:  right   Workman's Comp: No  Auto Accident Injury: No    History of Present Illness:     Location: right neck, shoulder, upper arm, elbow, forearm, wrist, hand   When the pain began:   1 week(s) ago  Mechanism of Injury: Strain from pulling down a box off the shelf  Level of Pain:8/10  Pain Quality: aching, sharp, pressure and throbbing The pain has never improved since onset.  Pain is better with: Rest, Ice, Heat, Tylenol,  Tramadol and Other medications: Mobilizing fingers  Pain is worse with:  Range of motion  Similar problems: No previous problem in the affected area.  Treatment so far consists of:  rest, Ice, Heat, Tylenol, tramadol .   Associated Features: Swelling  Limitations due to condition:  Pain is limiting normal activities of daily living.    Any other information important in HPI: See above.  Patient does have a history of MS    Patient's past medical, surgical, social and family histories reviewed.     Past Medical History:   Diagnosis Date     Lumbago      Multiple sclerosis (H)      Unspecified hypothyroidism     Hypothyroidism       Past Surgical History:   Procedure Laterality Date     ESOPHAGOSCOPY, GASTROSCOPY, DUODENOSCOPY (EGD), COMBINED N/A 2016    Procedure: COMBINED ESOPHAGOSCOPY, GASTROSCOPY, DUODENOSCOPY (EGD);  Surgeon: Walter Rodriguez MD;  Location: [facility]     ESOPHAGOSCOPY, GASTROSCOPY, DUODENOSCOPY (EGD), COMBINED N/A 2/15/2016    Procedure: COMBINED ESOPHAGOSCOPY, GASTROSCOPY, DUODENOSCOPY (EGD);  Surgeon: Narendra Mendoza MD;  Location: [facility]     HC CLOSED TX TIBIAL SHAFT FX W/O MANIPULATION  2003    Closed reduction and long leg cast application of left tib-fib fx.     HC INJ EPIDURAL LUMBAR/SACRAL W/WO CONTRAST  2006    Steroid injection L4-5     HC TREATMENT MISSED  SURG, 1ST TRIMESTER      Suction dilatation and curettage       Home Medications:  Prior to Admission medications    Medication Sig Start Date End Date Taking? Authorizing Provider   acetaminophen (TYLENOL) 325 MG tablet Take 975 mg by mouth every 8 hours as needed for pain  2/15/16   Crista Marino MD   levothyroxine (SYNTHROID/LEVOTHROID) 112 MCG tablet Take 1 tablet (112 mcg) by mouth daily 20   Narendra Peters MD   Multiple Vitamins-Minerals (MULTIVITAMIN ADULT PO) Take 1 tablet by mouth daily    Reported, Patient   Pseudoephedrine HCl (SUDAFED PO) Take 60 mg by mouth every 6 hours as  needed     Reported, Patient   sertraline (ZOLOFT) 50 MG tablet Take 1 tablet (50 mg) by mouth daily 9/28/20   Narendra Peters MD   traMADol (ULTRAM) 50 MG tablet Take 1 tablet (50 mg) by mouth every 6 hours as needed for pain 5/5/21   Narendra Peters MD       Allergies   Allergen Reactions     Cymbalta Nausea     Sulfa Drugs Swelling       Social History     Occupational History     Not on file   Tobacco Use     Smoking status: Current Every Day Smoker     Packs/day: 0.50     Types: Cigarettes     Smokeless tobacco: Never Used   Substance and Sexual Activity     Alcohol use: No     Drug use: No     Sexual activity: Yes     Partners: Male       Family History   Problem Relation Age of Onset     Hypertension Maternal Grandfather      Arthritis Maternal Grandfather      Cerebrovascular Disease Maternal Uncle      Arthritis Maternal Grandmother      Musculoskeletal Disorder Maternal Uncle      Thyroid Disease Sister        REVIEW OF SYSTEMS    General: Negative for fever or fatigue    Psych:  positive anxiety or depression     Ophthalmic:  Corrective lenses?  No    ENT:  Hearing difficulty? No    CV: negative for chest pain, venous insufficiency, no history of MI or stroke    Endocrine:  negative diabetes     Urology:  negative kidney disease    Resp:  Normal respiratory effort, no history of pulmonary disease or asthma    Skin: negative for cuts/sores or redness    Musculoskeletal: as above, multiple sclerosis    Neurologic:negative for numbness/tingling    Hematologic: negative for bleeding disorder, does not use of prescription anticoagulants       Physical Exam:    Vitals: There were no vitals taken for this visit.  Patient's most recent vitals were taken on 5/4/2021 with blood pressure being within normal limits.  A weight however was done at this visit since she states her last 1 was verbal guess of her weight.  Patient's current weight is 102.5 pounds measured in clinic today. Wt 46.5 kg (102 lb 8 oz)    BMI 19.37 kg/m      GENERAL APPEARANCE: Healthy, alert, no distress    SKIN:  negative for suspicious lesions or rashes    NEURO: Normal strength and tone, mentation intact and speech normal    PSYCH:   Mentation appears Normal and affect normal/bright    RESPIRATORY: negative for respiratory distress.    EYES: negative for Conjunctivitis    Cardiovascular: No vascular discoloration;  Fingers cool in the right hand.  There is no blue coloring.    JOINT/EXTREMITIES:  right   SHOULDER Exam-Right   Inspection: no swelling, no bruising, no discoloration, no obvious deformity, no asymmetry, no glenohumeral joint anterior bulge, no distal clavicle elevation, no muscle atrophy, no scapular winging   Tenderness of: Patient is tender along the top and posterior portions of the shoulder girdle.  This is not specified to any portion of the joint.  This appears more musculature in nature to where her pain is present   Range of Motion: Active-patient is able to draw her right shoulder up into a full 180 arc of motion in abduction and forward flexion however it takes her time to do this and she does this awkward manner for a circular manner versus the direct planes.   Strength: Strength is decreased in abduction secondary to pain     Elbow Exam: Inspection: swelling present but no significant bruising   tender: Patient with no specific site tenderness.  She has generalized tenderness.  Range of Motion: Patient was symmetrical flexion and extension at the elbow.  She does not have full extension at the right elbow with approximately a 20 degree extension contracture.  Her left arm has approximately a 10 degree extension contracture.  Special tests: Patient has more laxity of the medial collateral ligament but when compared to the left side this appears symmetrical.  There is appropriate tension of the lateral collateral ligament:     Wrist Exam: WRIST:  Inspection: swelling present of the hand and the wrist.  Palpation: Tender:  Directly over the radius and the radial ulnar joint.  Range of Motion: Radial and ulnar deviation is painful to the patient.  Patient has appropriate abilities for flexion to approximately 45 degrees which is equal and symmetrical to her left.  She has just a few degrees of extension ability of the right hand which is approximately 20 degrees different from her left.    Hand/Finger Exam: Inspection: There is considerable swelling of the hand.  Tender: There are no specific sites of the hands or the fingers with tenderness or bruising  Range of Motion: Patient is able to mobilize all her fingers and pull them into a     Cervical Spine Exam: Inspection: no scapular winging, no bruising present  Tender: No tenderness over the spiny prominences  Patient does have tenderness of the muscle groups surrounding this cervical spine  Range of Motion:  flexion:  full, extension: decreased, painful, left lateral rotation:  decreased, right lateral rotation:  decreased, painful    GAIT: Nonantalgic gait    Radiographs:   Recent Results (from the past 744 hour(s))   XR Elbow Right G/E 3 Views    Narrative    ELBOW RIGHT THREE OR MORE VIEWS, WRIST RIGHT THREE OR MORE VIEWS, HAND  RIGHT THREE OR MORE VIEWS, FOREARM RIGHT TWO VIEWS   5/4/2021 3:13 PM     HISTORY:  Trauma.    FINDINGS: Chondrocalcinosis of the elbow, wrist, and first-third MCP  joints, consistent with calcium pyrophosphate deposition disease.  Cystic change within the lunate.      Impression    IMPRESSION: No acute fracture identified.    ALANA AMOS MD   Radius/Ulna XR, PA & LAT, right    Narrative    ELBOW RIGHT THREE OR MORE VIEWS, WRIST RIGHT THREE OR MORE VIEWS, HAND  RIGHT THREE OR MORE VIEWS, FOREARM RIGHT TWO VIEWS   5/4/2021 3:13 PM     HISTORY:  Trauma.    FINDINGS: Chondrocalcinosis of the elbow, wrist, and first-third MCP  joints, consistent with calcium pyrophosphate deposition disease.  Cystic change within the lunate.      Impression    IMPRESSION:  No acute fracture identified.    ALANA AMOS MD   XR Hand Right 3 Views    Narrative    ELBOW RIGHT THREE OR MORE VIEWS, WRIST RIGHT THREE OR MORE VIEWS, HAND  RIGHT THREE OR MORE VIEWS, FOREARM RIGHT TWO VIEWS   5/4/2021 3:13 PM     HISTORY:  Trauma.    FINDINGS: Chondrocalcinosis of the elbow, wrist, and first-third MCP  joints, consistent with calcium pyrophosphate deposition disease.  Cystic change within the lunate.      Impression    IMPRESSION: No acute fracture identified.    ALANA AMOS MD   XR Wrist Right 3 Views    Narrative    ELBOW RIGHT THREE OR MORE VIEWS, WRIST RIGHT THREE OR MORE VIEWS, HAND  RIGHT THREE OR MORE VIEWS, FOREARM RIGHT TWO VIEWS   5/4/2021 3:13 PM     HISTORY:  Trauma.    FINDINGS: Chondrocalcinosis of the elbow, wrist, and first-third MCP  joints, consistent with calcium pyrophosphate deposition disease.  Cystic change within the lunate.      Impression    IMPRESSION: No acute fracture identified.    ALANA AMOS MD     All the above x-rays taken in the emergency department were reviewed.  There is no fractures identified.  Patient does have numerous calcifications present.  Independent visualization of the films was made.     Impression:      ICD-10-CM    1. Segmental dysfunction of cervical region  M99.01 PHYSICAL THERAPY REFERRAL   2. Shoulder strain, right, initial encounter  S46.911A PHYSICAL THERAPY REFERRAL   3. Strain of elbow and forearm, right, initial encounter  S56.911A OCCUPATIONAL THERAPY REFERRAL   4. Injury of right wrist, initial encounter  S69.91XA OCCUPATIONAL THERAPY REFERRAL   Patient sustained injury to her right arm on 4/29/2021.  There are no fractures present however patient does have very visible swelling of the elbow and the wrist.  On examination there is no clear-cut ligament laxity or disruption identified.  Patient appears to have strained the shoulder, elbow, wrist.  Patient also with cervical neck pain that is new since injury.    Plan:  All of the  above pertinent physical exam and imaging modalities findings was reviewed with Jaki.                                          CONSERVATIVE CARE:  I recommend conservative care for the patient to include Tramadol, Tylenol, activity modifications, rest, no pushing, pulling, and/or lifting.  Patient should discontinue use of the sling provided by the emergency department.  Patient was provided Tubigrip to attempt to reduce the swelling of the right lower arm.  Tubigrip should extend from above her elbow to the base of her fingers.  Patient is also asked to elevate above heart level when possible.  Patient is also advised that ice will reduce the swelling more so than the heat.  If she does utilize the heat she should follow-up with ice.  Patient has been scheduled for physical therapy to address cervical neck and shoulder pain  Patient is also been scheduled for occupational therapy to address elbow to hand swelling and pain                                          INJECTION PROCEDURE:  The patient was counseled about an  injection, including discussion of risks (including infection), contents of the injection, rationale for performing the injection, and expected benefits of the injection. The skin was prepped with alcohol and betadine and then utilizing sterile technique an injection of the right shoulder subacromial space from the posterolateral approach  was performed. The injection consisted of 2 mL of betamethasone and 6 mL of a mix of 1% lidocaine and 0.5% Marcaine. The patient tolerated the injection well, and there were no complications. The injection site was covered with a Band-Aid. The injection was performed by AMEYA Abad.      Return to clinic in 10 to 14 days.  We will reassess patient's pain levels, swelling, collateral ligament integrity of all the above.  She may require further imaging in the form of MRI based on examination from next appointment.    BP Readings from Last 1 Encounters:   05/04/21  123/76     Patient declined BP in clinic today    Marti Smith PA-C   5/7/2021  11:25 AM      Large Joint Injection/Arthocentesis: R subacromial bursa    Date/Time: 5/7/2021 1:05 PM  Performed by: Marti Smith PA-C  Authorized by: Marti Smith PA-C     Indications:  Pain  Needle Size:  25 G  Guidance: landmark guided    Approach:  Posterolateral  Location:  Shoulder      Site:  R subacromial bursa  Medications:  12 mg betamethasone acet & sod phos 6 (3-3) MG/ML  Outcome:  Tolerated well, no immediate complications  Procedure discussed: discussed risks, benefits, and alternatives    Consent Given by:  Patient  Timeout: timeout called immediately prior to procedure    Prep: patient was prepped and draped in usual sterile fashion              Again, thank you for allowing me to participate in the care of your patient.        Sincerely,        Marti Smith PA-C

## 2021-05-09 DIAGNOSIS — E03.9 HYPOTHYROIDISM, UNSPECIFIED TYPE: ICD-10-CM

## 2021-05-11 RX ORDER — LEVOTHYROXINE SODIUM 112 UG/1
112 TABLET ORAL DAILY
Qty: 90 TABLET | Refills: 0 | Status: SHIPPED | OUTPATIENT
Start: 2021-05-11

## 2021-05-11 NOTE — TELEPHONE ENCOUNTER
Routing refill request to provider for review/approval because:  Labs out of range:  TSH  TSH   Date Value Ref Range Status   11/30/2020 16.56 (H) 0.40 - 4.00 mU/L Final   A break in medication    Last Written Prescription Date:  12/9/2020  Last Fill Quantity: 90,  # refills: 0   Last office visit: Visit date not found with prescribing provider:  9/28/2020   Future Office Visit:      SARWAT StephenN, RN

## 2021-05-12 NOTE — MR AVS SNAPSHOT
"              After Visit Summary   5/3/2017    Jaki Gómez    MRN: [id number]           Patient Information     Date Of Birth          1960        Visit Information        Provider Department      5/3/2017 2:00 PM Sharri Nicolas DC [facility]        Today's Diagnoses     Segmental dysfunction of sacral region    -  1    Segmental dysfunction of cervical region        Segmental dysfunction of lumbar region        Brachial neuritis or radiculitis        Cervicalgia        Segmental dysfunction of thoracic region           Follow-ups after your visit        Who to contact     If you have questions or need follow up information about today's clinic visit or your schedule please contact [facility] directly at [phone].  Normal or non-critical lab and imaging results will be communicated to you by Vontoohart, letter or phone within 4 business days after the clinic has received the results. If you do not hear from us within 7 days, please contact the clinic through MyChart or phone. If you have a critical or abnormal lab result, we will notify you by phone as soon as possible.  Submit refill requests through [employer]ark or call your pharmacy and they will forward the refill request to us. Please allow 3 business days for your refill to be completed.          Additional Information About Your Visit        MyChart Information     [employer] lets you send messages to your doctor, view your test results, renew your prescriptions, schedule appointments and more. To sign up, go to [email or web address] . Click on \"Log in\" on the left side of the screen, which will take you to the Welcome page. Then click on \"Sign up Now\" on the right side of the page.     You will be asked to enter the access code listed below, as well as some personal information. Please follow the directions to create your username and password.     Your access code is: [email or web address]  Expires: 5/18/2017  " 3:13 PM     Your access code will  in 90 days. If you need help or a new code, please call your [facility] or [phone].        Care EveryWhere ID     This is your Care EveryWhere ID. This could be used by other organizations to access your [place] medical records  [id number]         Blood Pressure from Last 3 Encounters:   17 130/80   16 133/83   16 110/70    Weight from Last 3 Encounters:   17 65.3 kg (144 lb)   17 64 kg (141 lb)   16 59 kg (130 lb)              We Performed the Following     CHIROPRAC MANIP,SPINAL,3-4 REGIONS        Primary Care Provider Office Phone # Fax #    Narendra Peters [phone]       [facility] [address] [place] [address]        Thank you!     Thank you for choosing [facility]  for your care. Our goal is always to provide you with excellent care. Hearing back from our patients is one way we can continue to improve our services. Please take a few minutes to complete the written survey that you may receive in the mail after your visit with us. Thank you!             Your Updated Medication List - Protect others around you: Learn how to safely use, store and throw away your medicines at www.disposemymeds.org.          This list is accurate as of: 5/3/17  2:06 PM.  Always use your most recent med list.                   Brand Name Dispense Instructions for use    cyclobenzaprine 10 MG tablet    FLEXERIL    90 tablet    TAKE ONE TABLET BY MOUTH THREE TIMES A DAY AS NEEDED FOR MUSCLE SPASMS       gabapentin 300 MG capsule    NEURONTIN    90 capsule    TAKE ONE CAPSULE BY MOUTH THREE TIMES A DAY       levothyroxine 125 MCG tablet    SYNTHROID/LEVOTHROID    30 tablet    TAKE ONE TABLET BY MOUTH EVERY DAY       MULTIVITAMIN ADULT PO      Take 1 tablet by mouth daily       sertraline 100 MG tablet    ZOLOFT    30 tablet    TAKE ONE TABLET BY MOUTH EVERY DAY (FOLLOW-UP  APPOINTMENT NEEDED FOR FURTHER REFILLS)       traMADol 50 MG tablet    ULTRAM    120 tablet    Take 1 tablet (50 mg) by mouth every 6 hours as needed       TYLENOL 325 MG tablet   Generic drug:  acetaminophen     100 tablet    Take 2 tablets (650 mg) by mouth every 4 hours as needed          home

## 2021-06-01 DIAGNOSIS — M54.2 CERVICALGIA: ICD-10-CM

## 2021-06-01 NOTE — TELEPHONE ENCOUNTER
Requested Prescriptions   Pending Prescriptions Disp Refills     traMADol (ULTRAM) 50 MG tablet [Pharmacy Med Name: TRAMADOL HCL 50MG TABS] 120 tablet 0     Sig: TAKE ONE TABLET BY MOUTH EVERY 6 HOURS AS NEEDED FOR PAIN     Last Written Prescription Date:  5/05/2021  Last Fill Quantity: 120,   # refills: 0  Last Office Visit: 09/28/2020  Future Office visit:       Routing refill request to provider for review/approval because:  Drug not on the [facility], [facility] or [facility] refill protocol or controlled substance

## 2021-06-02 RX ORDER — TRAMADOL HYDROCHLORIDE 50 MG/1
TABLET ORAL
Qty: 120 TABLET | Refills: 0 | Status: SHIPPED | OUTPATIENT
Start: 2021-06-02 | End: 2021-06-30

## 2021-06-29 DIAGNOSIS — M54.2 CERVICALGIA: ICD-10-CM

## 2021-06-29 NOTE — TELEPHONE ENCOUNTER
Requested Prescriptions   Pending Prescriptions Disp Refills     traMADol (ULTRAM) 50 MG tablet [Pharmacy Med Name: TRAMADOL HCL 50MG TABS] 120 tablet 0     Sig: TAKE ONE TABLET BY MOUTH EVERY 6 HOURS AS NEEDED FOR PAIN     Last Written Prescription Date:  06/02/2021  Last Fill Quantity: 120,   # refills: 0  Last Office Visit: 09/28/2020  Future Office visit:       Routing refill request to provider for review/approval because:  Drug not on the [facility], [facility] or [facility] refill protocol or controlled substance

## 2021-06-30 RX ORDER — TRAMADOL HYDROCHLORIDE 50 MG/1
TABLET ORAL
Qty: 120 TABLET | Refills: 0 | Status: SHIPPED | OUTPATIENT
Start: 2021-06-30 | End: 2021-08-02

## 2021-07-28 DIAGNOSIS — M54.2 CERVICALGIA: ICD-10-CM

## 2021-07-30 ENCOUNTER — LAB (OUTPATIENT)
Dept: LAB | Facility: CLINIC | Age: 61
End: 2021-07-30
Payer: COMMERCIAL

## 2021-07-30 DIAGNOSIS — E03.9 HYPOTHYROIDISM, UNSPECIFIED TYPE: ICD-10-CM

## 2021-07-30 LAB
T4 FREE SERPL-MCNC: 0.86 NG/DL (ref 0.76–1.46)
TSH SERPL DL<=0.005 MIU/L-ACNC: 7.52 MU/L (ref 0.4–4)

## 2021-07-30 PROCEDURE — 84443 ASSAY THYROID STIM HORMONE: CPT

## 2021-07-30 PROCEDURE — 84439 ASSAY OF FREE THYROXINE: CPT

## 2021-07-30 PROCEDURE — 36415 COLL VENOUS BLD VENIPUNCTURE: CPT

## 2021-07-30 NOTE — TELEPHONE ENCOUNTER
Tramadol        Last Written Prescription Date:  6/30/2021  Last Fill Quantity: 120,   # refills: 0  Last Office Visit: 9/28/2020  Future Office visit:       Routing refill request to provider for review/approval because:  Drug not on the [facility], [facility] or [facility] refill protocol or controlled substance

## 2021-08-02 RX ORDER — TRAMADOL HYDROCHLORIDE 50 MG/1
TABLET ORAL
Qty: 120 TABLET | Refills: 0 | Status: SHIPPED | OUTPATIENT
Start: 2021-08-02 | End: 2021-08-31

## 2021-08-04 ENCOUNTER — TELEPHONE (OUTPATIENT)
Dept: FAMILY MEDICINE | Facility: CLINIC | Age: 61
End: 2021-08-04

## 2021-08-04 DIAGNOSIS — E03.9 HYPOTHYROIDISM, UNSPECIFIED TYPE: Primary | ICD-10-CM

## 2021-08-04 RX ORDER — LEVOTHYROXINE SODIUM 125 UG/1
125 TABLET ORAL DAILY
Qty: 90 TABLET | Refills: 0 | Status: SHIPPED | OUTPATIENT
Start: 2021-08-04 | End: 2022-01-27

## 2021-08-04 NOTE — TELEPHONE ENCOUNTER
Spoke with patient and informed of results below. Patient understood. Patient would like script sent to [facility]. Medication and future lab ordered pended. Please sign.   Mila Gaytan MA

## 2021-08-04 NOTE — TELEPHONE ENCOUNTER
----- Message from Narendra Peters MD sent at 8/4/2021 12:48 PM CDT -----  Thyroid test is still off we should increase your dose of Synthroid to 125 mcg a day up from the 112.  Recheck a thyroid test ( TSH )  in 3 months.

## 2021-08-05 NOTE — TELEPHONE ENCOUNTER
From: Ann Rater  To: Evelin Cruz DO  Sent: 8/5/2021 10:09 AM EDT  Subject: Prescription Question    Hello, I got the recent email about the prescription follow up and after the increase a few months ago it had been working. In the past week thought I have been waking up having a sort of panic attack and even the Ativan has not been working as well as it used to. I will feel very disoriented, hyper aware of my body, fast heart beat, and it will be hard to breathe. I guess I am wondering if we can have a virtual visit as soon as you can to go over prescriptions.    Thank you Script brought to [facility].

## 2021-08-30 DIAGNOSIS — M54.2 CERVICALGIA: ICD-10-CM

## 2021-08-31 RX ORDER — TRAMADOL HYDROCHLORIDE 50 MG/1
TABLET ORAL
Qty: 120 TABLET | Refills: 0 | Status: SHIPPED | OUTPATIENT
Start: 2021-08-31 | End: 2021-09-28

## 2021-08-31 NOTE — TELEPHONE ENCOUNTER
Routing refill request to provider for review/approval because:  Drug not on the [facility] refill protocol     Requested Prescriptions   Pending Prescriptions Disp Refills     traMADol (ULTRAM) 50 MG tablet [Pharmacy Med Name: TRAMADOL HCL 50MG TABS] 120 tablet 0     Sig: TAKE ONE TABLET BY MOUTH EVERY 6 HOURS AS NEEDED FOR PAIN       There is no refill protocol information for this order        Raquel Ballesteros, RN

## 2021-09-27 DIAGNOSIS — M54.2 CERVICALGIA: ICD-10-CM

## 2021-09-27 NOTE — TELEPHONE ENCOUNTER
Routing refill request to provider for review/approval because:  Drug not on the [facility] refill protocol     Last Written Prescription Date:  8/31/2021  Last Fill Quantity: 120,  # refills: 0   Last office visit: 9/28/2020 with prescribing provider:  Fran     Requested Prescriptions   Pending Prescriptions Disp Refills     traMADol (ULTRAM) 50 MG tablet [Pharmacy Med Name: TRAMADOL HCL 50MG TABS] 120 tablet 0     Sig: TAKE ONE TABLET BY MOUTH EVERY 6 HOURS AS NEEDED FOR PAIN       There is no refill protocol information for this order        Danay Carter RN on 9/27/2021 at 5:44 PM

## 2021-09-28 RX ORDER — TRAMADOL HYDROCHLORIDE 50 MG/1
TABLET ORAL
Qty: 120 TABLET | Refills: 0 | Status: SHIPPED | OUTPATIENT
Start: 2021-09-28 | End: 2021-10-26

## 2021-10-26 DIAGNOSIS — M54.2 CERVICALGIA: ICD-10-CM

## 2021-10-26 RX ORDER — TRAMADOL HYDROCHLORIDE 50 MG/1
TABLET ORAL
Qty: 120 TABLET | Refills: 0 | Status: SHIPPED | OUTPATIENT
Start: 2021-10-26 | End: 2021-11-29

## 2021-10-26 NOTE — TELEPHONE ENCOUNTER
Pending Prescriptions:                       Disp   Refills    traMADol (ULTRAM) 50 MG tablet [Pharmacy M*120 ta*0        Sig: TAKE ONE TABLET BY MOUTH EVERY 6 HOURS AS NEEDED FOR           PAIN    Routing refill request to provider for review/approval because:  Drug not on the [facility] refill protocol

## 2021-11-29 DIAGNOSIS — M54.2 CERVICALGIA: ICD-10-CM

## 2021-11-29 NOTE — TELEPHONE ENCOUNTER
Patient is calling and requesting refill for tramadol.  She stated pharmacy informed her it was denied.    Documentation shows pending by Dr. Bella MD who is not in the clinic at this time.  Patient stated she has been out of her medication for 3 days and would like to resubmit her request.    Will forward to PCP for review.      Tramadol  Routing refill request to provider for review/approval because:  Drug not on the [facility] refill protocol     Raquel Ballesteros RN

## 2021-11-30 RX ORDER — TRAMADOL HYDROCHLORIDE 50 MG/1
TABLET ORAL
Qty: 120 TABLET | Refills: 0 | OUTPATIENT
Start: 2021-11-30

## 2021-11-30 NOTE — TELEPHONE ENCOUNTER
Patient called asking why her tramadol is still not filled and would like a call back as soon as possible.

## 2021-11-30 NOTE — TELEPHONE ENCOUNTER
Patient is calling. She is VERY frustrated that this has not been filled. Patient said she has been on Tramadol for years. She said she called last Monday, 11/22/2021, for her refill and she has gone without medication for over a week.     Dr. Peters is not in clinic today. Patient said she has been out of medication for 5 days now.     Will see if covering provider will refill.    CHRISTEN Murdock, RN  [facility]    Tramadol      Last Written Prescription Date:  10/26/2021  Last Fill Quantity: 120,   # refills: 0  Last Office Visit: 9/28/2020  Future Office visit:       Routing refill request to provider for review/approval because:  Drug not on the [facility], P or [facility] refill protocol or controlled substance

## 2021-12-01 RX ORDER — TRAMADOL HYDROCHLORIDE 50 MG/1
50 TABLET ORAL EVERY 6 HOURS PRN
Qty: 120 TABLET | Refills: 0 | Status: SHIPPED | OUTPATIENT
Start: 2021-12-01 | End: 2021-12-29

## 2021-12-27 DIAGNOSIS — M54.2 CERVICALGIA: ICD-10-CM

## 2021-12-28 NOTE — TELEPHONE ENCOUNTER
Tramadol      Last Written Prescription Date:  12/1/2021  Last Fill Quantity: 120,   # refills: 0  Last Office Visit: 9/28/2020  Future Office visit:       Routing refill request to provider for review/approval because:  Drug not on the [facility], P or [facility] refill protocol or controlled substance

## 2021-12-29 RX ORDER — TRAMADOL HYDROCHLORIDE 50 MG/1
TABLET ORAL
Qty: 120 TABLET | Refills: 0 | Status: SHIPPED | OUTPATIENT
Start: 2021-12-29 | End: 2022-01-27

## 2022-01-26 DIAGNOSIS — M54.2 CERVICALGIA: ICD-10-CM

## 2022-01-26 DIAGNOSIS — E03.9 HYPOTHYROIDISM, UNSPECIFIED TYPE: ICD-10-CM

## 2022-01-27 RX ORDER — TRAMADOL HYDROCHLORIDE 50 MG/1
TABLET ORAL
Qty: 120 TABLET | Refills: 0 | Status: SHIPPED | OUTPATIENT
Start: 2022-01-27 | End: 2022-02-28

## 2022-01-27 RX ORDER — LEVOTHYROXINE SODIUM 125 UG/1
TABLET ORAL
Qty: 90 TABLET | Refills: 0 | Status: SHIPPED | OUTPATIENT
Start: 2022-01-27

## 2022-01-27 NOTE — TELEPHONE ENCOUNTER
Routing refill request to provider for review/approval because:  Labs not current:  [facility]    Tramadol      Last Written Prescription Date:  12/29/21  Last Fill Quantity: 120,   # refills: 0  Last Office Visit: 9/28/20  Future Office visit:       Routing refill request to provider for review/approval because:  Drug not on the [facility], P or [facility] refill protocol or controlled substance        Alla Travis RN

## 2022-02-25 DIAGNOSIS — M54.2 CERVICALGIA: ICD-10-CM

## 2022-02-28 RX ORDER — TRAMADOL HYDROCHLORIDE 50 MG/1
TABLET ORAL
Qty: 120 TABLET | Refills: 0 | Status: SHIPPED | OUTPATIENT
Start: 2022-02-28 | End: 2022-03-02

## 2022-02-28 NOTE — TELEPHONE ENCOUNTER
Pending Prescriptions:                       Disp   Refills    traMADol (ULTRAM) 50 MG tablet [Pharmacy M*120 ta*0        Sig: TAKE ONE TABLET BY MOUTH EVERY 6 HOURS AS NEEDED FOR           SEVERE PAIN    Routing refill request to provider for review/approval because:  Drug not on the [facility] refill protocol     Raquel Ballesteros RN

## 2022-03-31 DIAGNOSIS — M54.2 CERVICALGIA: ICD-10-CM

## 2022-04-01 RX ORDER — TRAMADOL HYDROCHLORIDE 50 MG/1
TABLET ORAL
Qty: 120 TABLET | Refills: 0 | Status: SHIPPED | OUTPATIENT
Start: 2022-04-01 | End: 2022-05-04

## 2022-04-01 NOTE — TELEPHONE ENCOUNTER
Tramadol      Last Written Prescription Date:  3/2/2022  Last Fill Quantity: 120,   # refills: 0  Last Office Visit: 9/28/2020  Future Office visit:       Routing refill request to provider for review/approval because:  Drug not on the [facility], [facility] or [facility] refill protocol or controlled substance

## 2022-05-02 DIAGNOSIS — M54.2 CERVICALGIA: ICD-10-CM

## 2022-05-04 RX ORDER — TRAMADOL HYDROCHLORIDE 50 MG/1
TABLET ORAL
Qty: 120 TABLET | Refills: 0 | Status: SHIPPED | OUTPATIENT
Start: 2022-05-04 | End: 2022-06-08

## 2022-05-04 NOTE — TELEPHONE ENCOUNTER
Tramadol      Last Written Prescription Date:  4/1/2022  Last Fill Quantity: 120,   # refills: 0  Last Office Visit: 9/28/2020  Future Office visit:       Routing refill request to provider for review/approval because:  Drug not on the [facility], P or [facility] refill protocol or controlled substance

## 2022-06-06 DIAGNOSIS — M54.2 CERVICALGIA: ICD-10-CM

## 2022-06-07 NOTE — TELEPHONE ENCOUNTER
Tramadol      Last Written Prescription Date:  5/4/2022  Last Fill Quantity: 120,   # refills: 0  Last Office Visit: 9/28/2020  Future Office visit:       Routing refill request to provider for review/approval because:  Drug not on the [facility], P or [facility] refill protocol or controlled substance

## 2022-06-08 DIAGNOSIS — M54.2 CERVICALGIA: ICD-10-CM

## 2022-06-08 RX ORDER — TRAMADOL HYDROCHLORIDE 50 MG/1
TABLET ORAL
Qty: 120 TABLET | Refills: 0 | Status: SHIPPED | OUTPATIENT
Start: 2022-06-08 | End: 2022-07-11

## 2022-06-08 RX ORDER — TRAMADOL HYDROCHLORIDE 50 MG/1
TABLET ORAL
Qty: 120 TABLET | Refills: 0 | OUTPATIENT
Start: 2022-06-08

## 2022-07-08 DIAGNOSIS — M54.2 CERVICALGIA: ICD-10-CM

## 2022-07-11 RX ORDER — TRAMADOL HYDROCHLORIDE 50 MG/1
TABLET ORAL
Qty: 120 TABLET | Refills: 0 | Status: SHIPPED | OUTPATIENT
Start: 2022-07-11 | End: 2022-08-17

## 2022-08-16 DIAGNOSIS — M54.2 CERVICALGIA: ICD-10-CM

## 2022-08-17 RX ORDER — TRAMADOL HYDROCHLORIDE 50 MG/1
TABLET ORAL
Qty: 120 TABLET | Refills: 0 | Status: SHIPPED | OUTPATIENT
Start: 2022-08-17 | End: 2022-09-21

## 2022-08-17 NOTE — TELEPHONE ENCOUNTER
Routing refill request to provider for review/approval because:    Requested Prescriptions   Pending Prescriptions Disp Refills    traMADol (ULTRAM) 50 MG tablet [Pharmacy Med Name: traMADol HCl 50 MG Oral Tablet] 120 tablet 0     Sig: TAKE 1 TABLET BY MOUTH EVERY 6 HOURS AS NEEDED FOR SEVERE PAIN        There is no refill protocol information for this order         traMADol (ULTRAM) 50 MG tablet 120 tablet 0 7/11/2022  No   Sig: TAKE 1 TABLET BY MOUTH EVERY 6 HOURS AS NEEDED FOR SEVERE PAIN   Sent to pharmacy as: traMADol HCl 50 MG Oral Tablet (ULTRAM)   Class: E-Prescribe   Order: [id number]   E-Prescribing Status: Receipt confirmed by pharmacy (7/11/2022 10:27 AM CDT

## 2022-09-17 ENCOUNTER — TRANSFERRED RECORDS (OUTPATIENT)
Dept: HEALTH INFORMATION MANAGEMENT | Facility: CLINIC | Age: 62
End: 2022-09-17

## 2022-09-17 LAB — HEMOCCULT STL QL IA: NORMAL

## 2022-09-20 DIAGNOSIS — M54.2 CERVICALGIA: ICD-10-CM

## 2022-09-21 RX ORDER — TRAMADOL HYDROCHLORIDE 50 MG/1
TABLET ORAL
Qty: 120 TABLET | Refills: 0 | Status: SHIPPED | OUTPATIENT
Start: 2022-09-21 | End: 2022-10-25

## 2022-09-21 NOTE — TELEPHONE ENCOUNTER
Requested Prescriptions   Pending Prescriptions Disp Refills     traMADol (ULTRAM) 50 MG tablet [Pharmacy Med Name: traMADol HCl 50 MG Oral Tablet] 120 tablet 0     Sig: TAKE 1 TABLET BY MOUTH EVERY 6 HOURS AS NEEDED FOR SEVERE PAIN     Last Written Prescription Date:  08/17/2022  Last Fill Quantity: 120,   # refills: 0  Last Office Visit: 09/28/2020  Future Office visit:       Routing refill request to provider for review/approval because:  Drug not on the [facility], P or [facility] refill protocol or controlled substance

## 2022-10-24 DIAGNOSIS — M54.2 CERVICALGIA: ICD-10-CM

## 2022-10-24 NOTE — LETTER
[facility] - [address]  [address]  Tel. [phone]    November 4, 2022        Jaki Gómez  [address] [address]          Dear Jaki,    We are concerned about your health care.  We recently provided you with a medication refill.  Many medications require routine follow-up with your Doctor.      At this time we ask that: You schedule an appointment for your annual physical. Call the clinic at [phone] Option 1 to schedule.     Your prescription:  Has been refilled for 1 month so you may have time for the above noted follow-up.      Thank you,      Sincerely,        Your [facility] team.

## 2022-10-25 RX ORDER — TRAMADOL HYDROCHLORIDE 50 MG/1
TABLET ORAL
Qty: 120 TABLET | Refills: 0 | Status: SHIPPED | OUTPATIENT
Start: 2022-10-25 | End: 2022-11-23

## 2022-10-25 NOTE — TELEPHONE ENCOUNTER
Tramadol  Requested Prescriptions   Pending Prescriptions Disp Refills     traMADol (ULTRAM) 50 MG tablet [Pharmacy Med Name: traMADol HCl 50 MG Oral Tablet] 120 tablet 0     Sig: TAKE 1 TABLET BY MOUTH EVERY 6 HOURS AS NEEDED FOR SEVERE PAIN       There is no refill protocol information for this order      Routing refill request to provider for review/approval because:  Drug not on the [facility] refill protocol   Patient needs to be seen because it has been more than 1 year since last office visit.  Last office visit 9/28/2022    Future Appointments 10/25/2022 - 4/23/2023    None        Sending to scheduling for yearly office visit due    Raquel Ballesteros RN

## 2022-11-22 ENCOUNTER — TELEPHONE (OUTPATIENT)
Dept: FAMILY MEDICINE | Facility: CLINIC | Age: 62
End: 2022-11-22

## 2022-11-22 DIAGNOSIS — M54.2 CERVICALGIA: ICD-10-CM

## 2022-11-22 NOTE — TELEPHONE ENCOUNTER
----- Message from Narendra Peters MD sent at 11/22/2022 12:54 PM CST -----  Test for blood in stool was negative

## 2022-11-22 NOTE — LETTER
November 25, 2022      Jaki Gómez  [address] [place] [address]        Dear Ms.Williams Gómez,    We are writing to inform you of your test results.    Your FIT test was NEGATIVE, there was no blood found in your stool      If you have any questions or concerns, please call the clinic at the number listed above.       Sincerely,    Narendra Peters MD

## 2022-11-23 RX ORDER — TRAMADOL HYDROCHLORIDE 50 MG/1
TABLET ORAL
Qty: 120 TABLET | Refills: 0 | Status: SHIPPED | OUTPATIENT
Start: 2022-11-23 | End: 2022-12-22

## 2022-12-21 DIAGNOSIS — M54.2 CERVICALGIA: ICD-10-CM

## 2022-12-22 RX ORDER — TRAMADOL HYDROCHLORIDE 50 MG/1
TABLET ORAL
Qty: 120 TABLET | Refills: 0 | Status: SHIPPED | OUTPATIENT
Start: 2022-12-22

## 2024-04-05 NOTE — MR AVS SNAPSHOT
"              After Visit Summary   3/30/2017    Jaki Gómez    MRN: [id number]           Patient Information     Date Of Birth          1960        Visit Information        Provider Department      3/30/2017 2:40 PM Apolinar Marie MD [facility]        Today's Diagnoses     Spinal stenosis in cervical region    -  1       Follow-ups after your visit        Who to contact     If you have questions or need follow up information about today's clinic visit or your schedule please contact [facility] directly at [phone].  Normal or non-critical lab and imaging results will be communicated to you by [employer], letter or phone within 4 business days after the clinic has received the results. If you do not hear from us within 7 days, please contact the clinic through [employer] or phone. If you have a critical or abnormal lab result, we will notify you by phone as soon as possible.  Submit refill requests through [employer] or call your pharmacy and they will forward the refill request to us. Please allow 3 business days for your refill to be completed.          Additional Information About Your Visit        MyChart Information     [employer] lets you send messages to your doctor, view your test results, renew your prescriptions, schedule appointments and more. To sign up, go to [email or web address] . Click on \"Log in\" on the left side of the screen, which will take you to the Welcome page. Then click on \"Sign up Now\" on the right side of the page.     You will be asked to enter the access code listed below, as well as some personal information. Please follow the directions to create your username and password.     Your access code is: [email or web address]  Expires: 2017  3:13 PM     Your access code will  in 90 days. If you need help or a new code, please call your [facility] or [phone].        Care EveryWhere ID     This is your Care EveryWhere ID. This could be used by " "other organizations to access your [facility] medical records  [id number]        Your Vitals Were     Temperature Height BMI (Body Mass Index)             97.3  F (36.3  C) (Skin) 1.549 m (5' 1\") 27.21 kg/m2          Blood Pressure from Last 3 Encounters:   03/08/17 130/80   09/02/16 133/83   02/19/16 110/70    Weight from Last 3 Encounters:   03/30/17 65.3 kg (144 lb)   03/08/17 64 kg (141 lb)   09/02/16 59 kg (130 lb)              Today, you had the following     No orders found for display       Primary Care Provider Office Phone # Fax #    Narendra Peters [phone]       [facility] [address] [place] [address]        Thank you!     Thank you for choosing [facility]  for your care. Our goal is always to provide you with excellent care. Hearing back from our patients is one way we can continue to improve our services. Please take a few minutes to complete the written survey that you may receive in the mail after your visit with us. Thank you!             Your Updated Medication List - Protect others around you: Learn how to safely use, store and throw away your medicines at www.disposemymeds.org.          This list is accurate as of: 3/30/17  4:46 PM.  Always use your most recent med list.                   Brand Name Dispense Instructions for use    gabapentin 300 MG capsule    NEURONTIN    90 capsule    TAKE ONE CAPSULE BY MOUTH THREE TIMES A DAY       levothyroxine 125 MCG tablet    SYNTHROID/LEVOTHROID    30 tablet    TAKE ONE TABLET BY MOUTH EVERY DAY       MULTIVITAMIN ADULT PO      Take 1 tablet by mouth daily       sertraline 100 MG tablet    ZOLOFT    30 tablet    TAKE ONE TABLET BY MOUTH EVERY DAY (FOLLOW-UP APPOINTMENT NEEDED FOR FURTHER REFILLS)       traMADol 50 MG tablet    ULTRAM    120 tablet    Take 1 tablet (50 mg) by mouth every 6 hours as needed       TYLENOL 325 MG tablet   Generic drug:  acetaminophen     100 tablet    Take 2 " tablets (650 mg) by mouth every 4 hours as needed          PAST MEDICAL HISTORY:  Diabetes     Hyperlipidemia     Major depression